# Patient Record
Sex: FEMALE | Race: BLACK OR AFRICAN AMERICAN | Employment: FULL TIME | URBAN - METROPOLITAN AREA
[De-identification: names, ages, dates, MRNs, and addresses within clinical notes are randomized per-mention and may not be internally consistent; named-entity substitution may affect disease eponyms.]

---

## 2017-10-03 ENCOUNTER — OFFICE VISIT (OUTPATIENT)
Dept: FAMILY MEDICINE CLINIC | Age: 47
End: 2017-10-03
Payer: MEDICARE

## 2017-10-03 VITALS
OXYGEN SATURATION: 96 % | HEART RATE: 77 BPM | WEIGHT: 293 LBS | HEIGHT: 64 IN | SYSTOLIC BLOOD PRESSURE: 138 MMHG | BODY MASS INDEX: 50.02 KG/M2 | DIASTOLIC BLOOD PRESSURE: 78 MMHG

## 2017-10-03 DIAGNOSIS — Z23 IMMUNIZATION DUE: ICD-10-CM

## 2017-10-03 DIAGNOSIS — N32.89 BLADDER SPASM: ICD-10-CM

## 2017-10-03 DIAGNOSIS — E78.2 MIXED HYPERLIPIDEMIA: ICD-10-CM

## 2017-10-03 DIAGNOSIS — M16.10 ARTHROPATHY OF HIP: ICD-10-CM

## 2017-10-03 DIAGNOSIS — E66.01 MORBID OBESITY DUE TO EXCESS CALORIES (HCC): ICD-10-CM

## 2017-10-03 DIAGNOSIS — I10 ESSENTIAL HYPERTENSION: Primary | ICD-10-CM

## 2017-10-03 DIAGNOSIS — Z51.81 MEDICATION MONITORING ENCOUNTER: ICD-10-CM

## 2017-10-03 PROCEDURE — 99204 OFFICE O/P NEW MOD 45 MIN: CPT | Performed by: FAMILY MEDICINE

## 2017-10-03 PROCEDURE — 90471 IMMUNIZATION ADMIN: CPT | Performed by: FAMILY MEDICINE

## 2017-10-03 PROCEDURE — 90688 IIV4 VACCINE SPLT 0.5 ML IM: CPT | Performed by: FAMILY MEDICINE

## 2017-10-03 RX ORDER — NAPROXEN 500 MG/1
500 TABLET ORAL 2 TIMES DAILY WITH MEALS
Qty: 180 TABLET | Refills: 3 | Status: SHIPPED | OUTPATIENT
Start: 2017-10-03 | End: 2018-10-15 | Stop reason: SDUPTHER

## 2017-10-03 RX ORDER — SOLIFENACIN SUCCINATE 5 MG/1
5 TABLET, FILM COATED ORAL DAILY
Qty: 30 TABLET | Refills: 3 | COMMUNITY
Start: 2017-10-03 | End: 2017-11-30 | Stop reason: SDUPTHER

## 2017-10-03 RX ORDER — PROMETHAZINE HYDROCHLORIDE 12.5 MG/1
12.5 TABLET ORAL EVERY 6 HOURS PRN
Qty: 60 TABLET | Refills: 5 | Status: SHIPPED | OUTPATIENT
Start: 2017-10-03 | End: 2018-10-15 | Stop reason: SDUPTHER

## 2017-10-03 RX ORDER — BUTALBITAL, ACETAMINOPHEN, CAFFEINE AND CODEINE PHOSPHATE 300; 50; 40; 30 MG/1; MG/1; MG/1; MG/1
1 CAPSULE ORAL EVERY 4 HOURS PRN
Qty: 60 CAPSULE | Refills: 2 | Status: SHIPPED | OUTPATIENT
Start: 2017-10-03 | End: 2018-01-06

## 2017-10-03 RX ORDER — PHENTERMINE HYDROCHLORIDE 37.5 MG/1
37.5 TABLET ORAL
Qty: 30 TABLET | Refills: 0 | Status: SHIPPED | OUTPATIENT
Start: 2017-10-03 | End: 2017-11-02

## 2017-10-03 RX ORDER — AMLODIPINE BESYLATE 5 MG/1
5 TABLET ORAL DAILY
Qty: 90 TABLET | Refills: 3 | Status: SHIPPED | OUTPATIENT
Start: 2017-10-03 | End: 2018-10-15 | Stop reason: SDUPTHER

## 2017-10-03 ASSESSMENT — PATIENT HEALTH QUESTIONNAIRE - PHQ9
1. LITTLE INTEREST OR PLEASURE IN DOING THINGS: 0
SUM OF ALL RESPONSES TO PHQ QUESTIONS 1-9: 0
2. FEELING DOWN, DEPRESSED OR HOPELESS: 0
SUM OF ALL RESPONSES TO PHQ9 QUESTIONS 1 & 2: 0

## 2017-10-03 ASSESSMENT — ENCOUNTER SYMPTOMS
EYE REDNESS: 0
NAUSEA: 0
COLOR CHANGE: 0
BLOOD IN STOOL: 0
ABDOMINAL DISTENTION: 0
RHINORRHEA: 0
VOMITING: 0
WHEEZING: 0
EYE DISCHARGE: 0
SHORTNESS OF BREATH: 0
VOICE CHANGE: 0
COUGH: 0
PHOTOPHOBIA: 0
BACK PAIN: 0
CONSTIPATION: 0
FACIAL SWELLING: 0
EYE PAIN: 0
SINUS PRESSURE: 0
ABDOMINAL PAIN: 0
SORE THROAT: 0
DIARRHEA: 0
CHEST TIGHTNESS: 0
EYE ITCHING: 0

## 2017-10-03 NOTE — PATIENT INSTRUCTIONS
nutrition, which is to say try to increase the density of nutrients in your food. Much of what triggers hunger is the constant sampling of blood chemistry that goes on in the brain, if youre deficient in a certain vitamin or mineral your brain will turn on your hunger in an effort to get those nutrients into the body, this is also to some degree what triggers cravings. To that end, taking a daily multivitamin is a good idea if youre trying to lose weight but its also a good idea to shop the edge of the store. What I mean here is that in most grocery stores the outside isles of the stores usually have fresh fruits and vegetables near the entrance followed by meats, then dairy, and finally frozen foods. When you get into the middle isles where everything is in a bag, box, or can; this is where there is a lot of calorie density but decreased nutritional value.   -eating healthy takes a lot more work than you may be used to and usually costs more as well so dont let these discourage you. You will probably have to begin setting aside time 1-2 times per week to cut and bag your own vegetables or other foods. Try to use zipper bags or plastic containers to portion out foods so that you dont eat more than you intended to. As an example, if you sit down watching TV with a bag of chips, you will continue eating the chips really not thinking about it because youre watching the TV, if instead you pour the portion you want in a bowl and take it with you youll eat that many and no more (unless you get up to get more). You can take that same large bag of chips and portion it into smaller zipper bags for use even outside your home.   -try to plan out your meals in advance, some people plan their meals a week at a time and set up everything in portioned out meals for the whole week. Planning prevents you from grabbing whats easy instead of whats nutritious.  It also helps you keep track of how many calories you are using an rene on your phone, tablet, or computer to track calories. I recommend the [employer] rene which is free for apple and android platforms. It will after using it for a week or so start giving you feedback on how to modify your diet. You can also link it to other people which really helps to motivate you to stay on track (its like the aditya system for weight loss). Once youve given some of these a chance (and again dont try to lose more than about a pound per week) we can start to talk about next steps. I generally ask that you give 3-4 months to work on these before moving on to the next phase of treatment if needed. One final note, while youre trying to lose weight weigh yourself only once per week, there is up to 5 pounds of variation from one day to the next depending on how well hydrated you are and whether youve pooped that day and weighing yourself daily can be counterproductive to your goals. How your clothing fits is a much better indication of weight loss than the scale, especially when youre adding in exercise which causes muscle gain in place of fat loss. When trying to lose weight it really boils down to burning more calories than you take in. This is always easier to achieve by taking less in and the example that I use is metcalf. One slice of thin cut metcalf has enough calories that the average person will have to walk or run about 2 miles to burn them off, so its obviously easier to limit yourself to two strips than it is to eat 4 and then go out and walk 4 miles. At this point, you should have been working on dietary modification for a few weeks and hopefully youve had some initial success but even with the analogy above, at some point you will have to increase physical activity because the more you restrict your calorie intake the more your metabolic rate will be suppressed so eventually your weight loss will plateau.   When you stop losing with diet modification alone, dont try to further restrict your calories, instead try to ramp up your metabolic rate. There are several ways to do this, the best being adding physical activity to your regimen. I encourage all patients, whether trying to lose weight or not to get a minimum of 150 minutes of physical activity per week and by physical activity I mean any activity that can get your heart rate over 100. It doesnt matter how this totals, it can be 50 minutes 3 times per week, 30 minutes 5 times per week, or 150 minutes one day per week, its the total that matters. Many people who are trying to lose weight are doing so because they have medical or physical limitations that prevent them from being as active as they would like, which I understand. Try to choose activities that you enjoy, for example going for a walk at a park at a brisk pace or simply parking further away at the store than you usually would. Low impact exercises are preferable such as brisk walking, swimming, or biking because they dont abuse your joints as much but its also important to get some resistance training a few times per week as well. Weight lifting or using resistance bands (giant rubber bands) can achieve this and should be chosen so that over the course of the week they work all the major muscle groups in your body. Resistance training builds muscle and muscle burns calories even when you arent using them. An example here would be to do resistance training on your arms and upper body on Monday, brisk walking on Tuesday, resistance training on the legs on Wednesday, brisk walk again on Thursday, resistance training on your core on Friday, brisk walk again on Saturday, and then start the cycle over. Swimming is an example of exercise that builds muscle and works your heart at the same time. You may want to consider subscribing to a magazine such as [id number] or [address] which both have great diet and exercise tips in them.    Besides exercise there are several medications that help to boost your metabolic rate: Adipex which is an amphetamine can boost your metabolic rate and to some degree also suppresses hunger but has some potentially negative side effects such as elevations in blood pressure, and pulse rate as well as some interference with sleep. Amphetamines also carry risk of dependence and addiction and for this reason there are some laws that affect Adipex that do not apply to other medications for instance we are required by law to see you at least once per month while on this medication, you have to fill the medication every 30 days (going more than 36 days between fills counts as a new start), and you can only be on it for a maximum of 90 days before youre legally required to be off of it for 6 months. If there is a situation where it may be considered a new start, there must not have been any use within the previous 6 months. One advantage of Adipex however is that its generally the least expensive weight loss medication and many insurances do not pay for weight loss medication. Qsymia is another medication that contains the same amphetamine as Adipex but at lower doses that reduce all the risks associated with Adipex including risk of dependence and addiction. Qsymia also contains a second medication that targets appetite and decreases the urge to eat when you arent truly hungry. The biggest drawback with Qsymia is the price and it generally averages between $/month. When you put this in perspective though $3/day is less than a [employer] cappuccino each day and if it positively affects your health, it may be well worth the cost.  This medication also requires monthly office weight checks but does not require a full office visit like Adipex does.    Contrave is a medication that contains Bupropion, a medication that boosts metabolic rate and also helps increase motivation and also Naltrexone which works on the pleasure center of the brain to block the pleasurable effect of eating so that you eat until youre full but dont continue to eat beyond that because it tastes good. This medication averages about $50/month and comes with a program from the company called the scale down program which is a support program that keeps you motivated toward your goal of weight loss. Contave is not quite as effective as Adipex or Qsymia but since the price is lower I often encourage this medication as a fist line treatment. This medication has a suggestion for monthly office weight monitoring but no legal requirement to do so. [employer] is a once daily injectable medication that doesnt boost your metabolic rate but does alter the way you store calories so that if there are excess calories, they are burned rather than stored. This medication also helps to make you feel full faster to prevent over eating. Among the various medication options this is actually one of the most effective but if not covered on your insurance could be as much as $1200/month. There are however very few negatives with this medication other than some mild nausea when starting. This medication has a suggestion for monthly office weight monitoring but no legal requirement to do so. Belviq is a medication that is a close cousin of antidepressant medications and does not affect metabolic rate but does help to suppress appetite and allows you to feel full faster. Belviqs main limitation is that many people struggling with weight are also being treated for mood disorders such as anxiety and depression and Belviq cannot be used in conjunction with medications that treat these disorders. The cost for this medication averages about $65/month and because it is not as effective as the rest is usually not suggested as a first line treatment. Xenical and Ori are both the same medication but at different doses.   Ori is available over the counter and Xenical is by

## 2017-10-03 NOTE — PROGRESS NOTES
Subjective:      Patient ID: Jessica Damon is a 40 y.o. female. HPI  Here to establish as a new patient and has been generally well and healthy and has been having some right hip pain which has been present for the past several months and has been associated with some catching and popping sensation. She has also had a long standing history of migraine since she was in her 25s and has been continuing to have some trouble with frequent headaches over the past several years with some slight increase in the intensity over the past few months. She uses a CPAP machine for sleep and has been compliant with this. She has not been checking BP at home or in the ambulatory setting. She also has been suffering from generalized anxiety for years which has been a little worse lately and is especially bad when she is in crowds or around people that she doesn't know. Her diet is generally poor and she has been through bariatric surgery in the past and has also been on adipex in the past with good success. She has been staying well hydrated and has had moderate physical activity. She sleeps well with the CPAP. Review of Systems   Constitutional: Negative for activity change, appetite change, chills, diaphoresis, fatigue, fever and unexpected weight change. HENT: Negative for congestion, ear pain, facial swelling, hearing loss, postnasal drip, rhinorrhea, sinus pressure, sore throat and voice change. Eyes: Negative for photophobia, pain, discharge, redness, itching and visual disturbance. Respiratory: Negative for cough, chest tightness, shortness of breath and wheezing. Cardiovascular: Negative for chest pain and palpitations. Gastrointestinal: Negative for abdominal distention, abdominal pain, blood in stool, constipation, diarrhea, nausea and vomiting. Endocrine: Negative for cold intolerance and heat intolerance.    Genitourinary: Negative for decreased urine volume, difficulty urinating, dysuria, flank pain, frequency, hematuria, pelvic pain, urgency, vaginal bleeding and vaginal discharge. Musculoskeletal: Negative for arthralgias, back pain, gait problem, joint swelling, myalgias, neck pain and neck stiffness. Skin: Negative for color change, pallor and rash. Allergic/Immunologic: Negative for environmental allergies and food allergies. Neurological: Negative for dizziness, tremors, seizures, syncope, facial asymmetry, speech difficulty, weakness, numbness and headaches. Psychiatric/Behavioral: Negative for agitation, behavioral problems, dysphoric mood and sleep disturbance. The patient is not nervous/anxious and is not hyperactive. Objective:   Physical Exam   Constitutional: She is oriented to person, place, and time. She appears well-developed and well-nourished. She does not appear ill. No distress. HENT:   Head: Normocephalic and atraumatic. Right Ear: External ear normal.   Left Ear: External ear normal.   Nose: Nose normal. No mucosal edema or rhinorrhea. Mouth/Throat: Mucous membranes are normal. No oropharyngeal exudate, posterior oropharyngeal edema or posterior oropharyngeal erythema. Eyes: EOM are normal. Pupils are equal, round, and reactive to light. Right eye exhibits no discharge. Left eye exhibits no discharge. No scleral icterus. Neck: Trachea normal and normal range of motion. Neck supple. No JVD present. Carotid bruit is not present. No tracheal deviation present. No thyromegaly present. Cardiovascular: Normal rate, regular rhythm, S1 normal, S2 normal, normal heart sounds and normal pulses. Exam reveals no gallop, no S3, no S4, no distant heart sounds and no friction rub. No murmur heard. Pulmonary/Chest: No respiratory distress. She has no decreased breath sounds. She has no wheezes. She has no rhonchi. She has no rales. Abdominal: Soft. Normal appearance and bowel sounds are normal. There is no hepatosplenomegaly. There is no tenderness. There is no CVA tenderness. No hernia. Musculoskeletal:        Right hip: She exhibits decreased range of motion and crepitus. She exhibits normal strength, no tenderness, no bony tenderness, no swelling, no deformity and no laceration. Lymphadenopathy:     She has no cervical adenopathy. Right cervical: No superficial cervical adenopathy present. Left cervical: No superficial cervical adenopathy present. Neurological: She is alert and oriented to person, place, and time. She has normal strength. No cranial nerve deficit or sensory deficit. Coordination and gait normal.   Reflex Scores:       Brachioradialis reflexes are 2+ on the right side and 2+ on the left side. Patellar reflexes are 2+ on the right side and 2+ on the left side. Achilles reflexes are 2+ on the right side and 2+ on the left side. Skin: Skin is warm and dry. No lesion and no rash noted. She is not diaphoretic. No cyanosis. Nails show no clubbing. Psychiatric: She has a normal mood and affect. Her speech is normal and behavior is normal. Judgment and thought content normal. Cognition and memory are normal.       Assessment:          Plan:      1. Essential hypertension  - Discussed the role of hypertension in development of other disease courses such as CHF and atherosclerosis. - Encouraged patient to avoid sodium in the diet and reminded that the majority of sodium comes from packaged foods in cans and boxes which should be avoided where possible. - Encouraged good hydration and avoidance of caffeine where possible. - Discussed goals for blood pressure monitoring which should be 140/90.     - amLODIPine (NORVASC) 5 MG tablet; Take 1 tablet by mouth daily  Dispense: 90 tablet; Refill: 3    2. Mixed hyperlipidemia  - Discussed the role of statins in the control of lipids and encouraged a low fat, low carbohydrate diet rich in vegetables and plant matter.    - Discussed the side effects of lipid lowering

## 2017-10-03 NOTE — MR AVS SNAPSHOT
After Visit Summary             Rasheeda Han   10/3/2017 9:30 AM   Office Visit    Description:  Female : 1970   Provider:  Blue Pratt MD   Department:  [address] [place] [address] Physicians              Your Follow-Up and Future Appointments         Below is a list of your follow-up and future appointments. This may not be a complete list as you may have made appointments directly with providers that we are not aware of or your providers may have made some for you. Please call your providers to confirm appointments. It is important to keep your appointments. Please bring your current insurance card, photo ID, co-pay, and all medication bottles to your appointment. If self-pay, payment is expected at the time of service. Your To-Do List     Future Appointments Provider Department Dept Phone    11/3/2017 11:00 AM SCHEDULE, [address] Physicians [phone]    If this is a fasting lab, please do not eat or drink past midnight other than water. 2017 11:00 AM SCHEDULE, [facility] [phone]    If this is a fasting lab, please do not eat or drink past midnight other than water. 2018 11:00 AM Blue Pratt MD [address] [facility] Phys[facility] [phone]    Please arrive 15 minutes prior to appointment, bring photo ID and insurance card. Future Orders Complete By Expires    C-Reactive Protein [YXW822 Custom]  10/3/2017 10/3/2018    CBC With Auto Differential [[id number] Custom]  10/3/2017 10/3/2018    Comprehensive Metabolic Panel [QJD53 Custom]  10/3/2017 10/3/2018    Lipid Panel [LAB18 Custom]  10/3/2017 10/3/2018    TSH [[id number] Custom]  10/3/2017 10/3/2018    XR HIP RIGHT (2-3 VIEWS) [[id number] Custom]  10/3/2017 10/3/2018    Follow-Up    Return in about 3 months (around 1/3/2018).          Information from Your Visit        Department     Name Address Phone Fax    [address] [facility] Physicians [address] [place] [address] [address] Rika Hartley  [address] [facility] [phone]      You Were Seen for:         Comments    Essential hypertension   [951014]         Vital Signs     Blood Pressure Pulse Height Weight Oxygen Saturation Body Mass Index    138/78 77 5' 4\" (1.626 m) 406 lb (184.2 kg) 96% 69.69 kg/m2    Smoking Status                   Never Smoker           Additional Information about your Body Mass Index (BMI)           Your BMI as listed above is considered obese (30 or more). BMI is an estimate of body fat, calculated from your height and weight. The higher your BMI, the greater your risk of heart disease, high blood pressure, type 2 diabetes, stroke, gallstones, arthritis, sleep apnea, and certain cancers. BMI is not perfect. It may overestimate body fat in athletes and people who are more muscular. Even a small weight loss (between 5 and 10 percent of your current weight) by decreasing your calorie intake and becoming more physically active will help lower your risk of developing or worsening diseases associated with obesity. Learn more at: [employer]          Instructions    -obesity is a condition that is not easily overcome and some of the mistakes that prevent success with weight loss will be briefly reviewed here. Keep in mind throughout this discussion that weight loss really boils down to one simple principle, you need to burn more calories than you take in.   -keep in mind that when you gain weight, you generally do it slowly so expectations for weight loss should be the same in reverse (meaning if you gained a pound per week, dont expect to lose more than a pound per week. Losing weight too quickly causes your metabolic rate to go into a starvation mode where your body will intentionally store some calories for later regardless of how much youre eating.   Because of that change in metabolism that calorie restriction causes, people often will ultimately this is where there is a lot of calorie density but decreased nutritional value.   -eating healthy takes a lot more work than you may be used to and usually costs more as well so dont let these discourage you. You will probably have to begin setting aside time 1-2 times per week to cut and bag your own vegetables or other foods. Try to use zipper bags or plastic containers to portion out foods so that you dont eat more than you intended to. As an example, if you sit down watching TV with a bag of chips, you will continue eating the chips really not thinking about it because youre watching the TV, if instead you pour the portion you want in a bowl and take it with you youll eat that many and no more (unless you get up to get more). You can take that same large bag of chips and portion it into smaller zipper bags for use even outside your home.   -try to plan out your meals in advance, some people plan their meals a week at a time and set up everything in portioned out meals for the whole week. Planning prevents you from grabbing whats easy instead of whats nutritious. It also helps you keep track of how many calories you are taking in per day and helps avoid going over your goal.   -its critical that you stay well hydrated as well, when you become dehydrated sometimes its hard for your brain to differentiate whether you need food or fluids and the hunger center gets switched on. Try to make sure that you are getting at least 80 ounces of water intake per day. Avoid drinking your calories, the average American consumes about 700 calories per day from beverages which accounts for 1/3 of the total calorie intake for the day. Also keep in mind that when something sweet hits your tongue it causes release of insulin in preparation to receive sugar in the blood.   If youre drinking beverages with sugar substitutes this still happens and when no sugar shows up in the blood stream, your blood sugar drops and your hunger is turned on in order to bring the blood sugar back up. For this reason WATER is the best beverage to be drinking. If you absolutely need flavor, drink low calorie fruit or vegetable juice so that youre at least getting some nutritional value.   -sleep is critically important as well. If youre sleep deprived you will release more cortisol (a stress hormone) that simulates hunger, lowers your metabolic rate, and causes fatigue. Try to go to bed at the same time every day and wake at the same time every day (even on weekends). Avoid TV or other light producing screens for about an hour before bed if possible in order to promote deeper more restful sleep.   -there is mounting research that many people who have obesity suffer from overgrowth of abnormal gut bacteria. As a result, I have suggested that patients trying to lose weight start on a probiotic, specifically florajen 3 or a generic equivalent (locally [place] has a good generic equivalent). Its important to get a probiotic that has several different types of bacteria which populate all of the three pH zones of the digestive tract.    -Finally, I highly suggest using an rene on your phone, tablet, or computer to track calories. I recommend the "[employer]pal rene which is free for apple and android platforms. It will after using it for a week or so start giving you feedback on how to modify your diet. You can also link it to other people which really helps to motivate you to stay on track (its like the aditya system for weight loss). Once youve given some of these a chance (and again dont try to lose more than about a pound per week) we can start to talk about next steps. I generally ask that you give 3-4 months to work on these before moving on to the next phase of treatment if needed.   One final note, while youre trying to lose weight weigh yourself only once per week, there is up to 5 they would like, which I understand. Try to choose activities that you enjoy, for example going for a walk at a park at a brisk pace or simply parking further away at the store than you usually would. Low impact exercises are preferable such as brisk walking, swimming, or biking because they dont abuse your joints as much but its also important to get some resistance training a few times per week as well. Weight lifting or using resistance bands (giant rubber bands) can achieve this and should be chosen so that over the course of the week they work all the major muscle groups in your body. Resistance training builds muscle and muscle burns calories even when you arent using them. An example here would be to do resistance training on your arms and upper body on Monday, brisk walking on Tuesday, resistance training on the legs on Wednesday, brisk walk again on Thursday, resistance training on your core on Friday, brisk walk again on Saturday, and then start the cycle over. Swimming is an example of exercise that builds muscle and works your heart at the same time. You may want to consider subscribing to a magazine such as [address] or [employer] which both have great diet and exercise tips in them. Besides exercise there are several medications that help to boost your metabolic rate: Adipex which is an amphetamine can boost your metabolic rate and to some degree also suppresses hunger but has some potentially negative side effects such as elevations in blood pressure, and pulse rate as well as some interference with sleep.   Amphetamines also carry risk of dependence and addiction and for this reason there are some laws that affect Adipex that do not apply to other medications for instance we are required by law to see you at least once per month while on this medication, you have to fill the medication every 30 days (going more than 36 days between fills counts as a new start), and you can only be on it for a maximum of 90 days before youre legally required to be off of it for 6 months. If there is a situation where it may be considered a new start, there must not have been any use within the previous 6 months. One advantage of Adipex however is that its generally the least expensive weight loss medication and many insurances do not pay for weight loss medication. Qsymia is another medication that contains the same amphetamine as Adipex but at lower doses that reduce all the risks associated with Adipex including risk of dependence and addiction. Qsymia also contains a second medication that targets appetite and decreases the urge to eat when you arent truly hungry. The biggest drawback with Qsymia is the price and it generally averages between $/month. When you put this in perspective though $3/day is less than a [employer] cappuccino each day and if it positively affects your health, it may be well worth the cost.  This medication also requires monthly office weight checks but does not require a full office visit like Adipex does. Contrave is a medication that contains Bupropion, a medication that boosts metabolic rate and also helps increase motivation and also Naltrexone which works on the pleasure center of the brain to block the pleasurable effect of eating so that you eat until youre full but dont continue to eat beyond that because it tastes good. This medication averages about $50/month and comes with a program from the company called the scale down program which is a support program that keeps you motivated toward your goal of weight loss. Contave is not quite as effective as Adipex or Qsymia but since the price is lower I often encourage this medication as a fist line treatment. This medication has a suggestion for monthly office weight monitoring but no legal requirement to do so. Dwana Castor is a once daily injectable medication that doesnt boost your metabolic rate but does alter the way you store calories so that if there are excess calories, they are burned rather than stored. This medication also helps to make you feel full faster to prevent over eating. Among the various medication options this is actually one of the most effective but if not covered on your insurance could be as much as $1200/month. There are however very few negatives with this medication other than some mild nausea when starting. This medication has a suggestion for monthly office weight monitoring but no legal requirement to do so. Belviq is a medication that is a close cousin of antidepressant medications and does not affect metabolic rate but does help to suppress appetite and allows you to feel full faster. Belviqs main limitation is that many people struggling with weight are also being treated for mood disorders such as anxiety and depression and Belviq cannot be used in conjunction with medications that treat these disorders. The cost for this medication averages about $65/month and because it is not as effective as the rest is usually not suggested as a first line treatment. Xenical and Ori are both the same medication but at different doses. Ori is available over the counter and Xenical is by prescription only. These medications block the absorption of fats in the diet which reduces absorption of calories. The main problem with these is that if you consume fats but dont absorb them they have to go somewhere and the main side effect is greasy flatulence and loose stools. This medication thereby creates an aversion to fatty foods which usually stays with you even after the medication is withdrawn. Medically the main complication is that with prolonged use (more than a few months) you can develop fat soluble vitamin deficiencies so it isnt recommended for long term use. Xenical usually averages about $ per month whereas Ori usually is in the $40-60/month range. Today's Medication Changes          These changes are accurate as of: 10/3/17 10:22 AM.  If you have any questions, ask your nurse or doctor. START taking these medications           butalbital-acetaminophen-caffeine-codeine -20-30 MG per capsule   Commonly known as:  FIORICET WITH CODEINE   Instructions: Take 1 capsule by mouth every 4 hours as needed (migraine)   Quantity:  60 capsule   Refills:  2   Started by:  Tara Reeder MD       phentermine 37.5 MG tablet   Commonly known as:  ADIPEX-P   Instructions: Take 1 tablet by mouth every morning (before breakfast)   Quantity:  30 tablet   Refills:  0   Started by:  Tara Reeder MD       solifenacin 5 MG tablet   Commonly known as:  VESICARE   Instructions: Take 1 tablet by mouth daily   Quantity:  30 tablet   Refills:  3   Started by:  Tara Reeder MD         STOP taking these medications           butalbital-acetaminophen-caffeine -40 MG per tablet   Commonly known as:  FIORICET   Stopped by:  Tara Reeder MD       metoprolol succinate 25 MG extended release tablet   Commonly known as:  Carliss Gambles by:  Tara Reeder MD            Where to Get Your Medications      These medications were sent to [facility] [address] [facility], [address]  [address] [facility], [address] [place] [id number]     Phone:  [phone]     amLODIPine 5 MG tablet    butalbital-acetaminophen-caffeine-codeine -60-30 MG per capsule    naproxen 500 MG tablet    phentermine 37.5 MG tablet    promethazine 12.5 MG tablet         Information about where to get these medications is not yet available     !  Ask your nurse or doctor about these medications     solifenacin 5 MG tablet               Your Current Medications Are

## 2017-10-13 ENCOUNTER — HOSPITAL ENCOUNTER (OUTPATIENT)
Age: 47
Discharge: HOME | End: 2017-10-13
Payer: MEDICARE

## 2017-10-13 ENCOUNTER — HOSPITAL ENCOUNTER (OUTPATIENT)
Dept: GENERAL RADIOLOGY | Age: 47
Discharge: HOME | End: 2017-10-13
Payer: MEDICARE

## 2017-10-13 DIAGNOSIS — R52 PAIN: ICD-10-CM

## 2017-10-13 DIAGNOSIS — E78.2 MIXED HYPERLIPIDEMIA: ICD-10-CM

## 2017-10-13 DIAGNOSIS — M16.10 ARTHROPATHY OF HIP: ICD-10-CM

## 2017-10-13 DIAGNOSIS — Z51.81 MEDICATION MONITORING ENCOUNTER: ICD-10-CM

## 2017-10-13 LAB
ABSOLUTE EOS #: 0.2 K/UL (ref 0–0.4)
ABSOLUTE LYMPH #: 1.5 K/UL (ref 1–4.8)
ABSOLUTE MONO #: 0.2 K/UL (ref 0.2–0.8)
ALBUMIN SERPL-MCNC: 3.8 G/DL (ref 3.5–5.2)
ALBUMIN/GLOBULIN RATIO: ABNORMAL (ref 1–2.5)
ALP BLD-CCNC: 61 U/L (ref 35–104)
ALT SERPL-CCNC: 8 U/L (ref 5–33)
ANION GAP SERPL CALCULATED.3IONS-SCNC: 10 MMOL/L (ref 9–17)
AST SERPL-CCNC: 13 U/L
BASOPHILS # BLD: 1 %
BASOPHILS ABSOLUTE: 0 K/UL (ref 0–0.2)
BILIRUB SERPL-MCNC: 0.26 MG/DL (ref 0.3–1.2)
BUN BLDV-MCNC: 16 MG/DL (ref 6–20)
BUN/CREAT BLD: 23 (ref 9–20)
C-REACTIVE PROTEIN: 5.1 MG/L (ref 0–5)
CALCIUM SERPL-MCNC: 8.7 MG/DL (ref 8.6–10.4)
CHLORIDE BLD-SCNC: 106 MMOL/L (ref 98–107)
CHOLESTEROL/HDL RATIO: 3.2
CHOLESTEROL: 143 MG/DL
CO2: 24 MMOL/L (ref 20–31)
CREAT SERPL-MCNC: 0.69 MG/DL (ref 0.5–0.9)
DIFFERENTIAL TYPE: ABNORMAL
EOSINOPHILS RELATIVE PERCENT: 5 %
GFR AFRICAN AMERICAN: >60 ML/MIN
GFR NON-AFRICAN AMERICAN: >60 ML/MIN
GFR SERPL CREATININE-BSD FRML MDRD: ABNORMAL ML/MIN/{1.73_M2}
GFR SERPL CREATININE-BSD FRML MDRD: ABNORMAL ML/MIN/{1.73_M2}
GLUCOSE BLD-MCNC: 85 MG/DL (ref 70–99)
HCT VFR BLD CALC: 38 % (ref 36–46)
HDLC SERPL-MCNC: 45 MG/DL
HEMOGLOBIN: 12.5 G/DL (ref 12–16)
LDL CHOLESTEROL: 90 MG/DL (ref 0–130)
LYMPHOCYTES # BLD: 49 %
MCH RBC QN AUTO: 28.2 PG (ref 26–34)
MCHC RBC AUTO-ENTMCNC: 32.9 G/DL (ref 31–37)
MCV RBC AUTO: 85.7 FL (ref 80–100)
MONOCYTES # BLD: 8 %
PDW BLD-RTO: 14.8 % (ref 11.5–14.5)
PLATELET # BLD: 254 K/UL (ref 130–400)
PLATELET ESTIMATE: ABNORMAL
PMV BLD AUTO: 8.2 FL (ref 6–12)
POTASSIUM SERPL-SCNC: 4.1 MMOL/L (ref 3.7–5.3)
RBC # BLD: 4.43 M/UL (ref 4–5.2)
RBC # BLD: ABNORMAL 10*6/UL
SEG NEUTROPHILS: 37 %
SEGMENTED NEUTROPHILS ABSOLUTE COUNT: 1.1 K/UL (ref 1.8–7.7)
SODIUM BLD-SCNC: 140 MMOL/L (ref 135–144)
TOTAL PROTEIN: 7.4 G/DL (ref 6.4–8.3)
TRIGL SERPL-MCNC: 39 MG/DL
TSH SERPL DL<=0.05 MIU/L-ACNC: 1.65 MIU/L (ref 0.3–5)
VLDLC SERPL CALC-MCNC: NORMAL MG/DL (ref 1–30)
WBC # BLD: 3.1 K/UL (ref 3.5–11)
WBC # BLD: ABNORMAL 10*3/UL

## 2017-10-13 PROCEDURE — 80053 COMPREHEN METABOLIC PANEL: CPT

## 2017-10-13 PROCEDURE — 84443 ASSAY THYROID STIM HORMONE: CPT

## 2017-10-13 PROCEDURE — 36415 COLL VENOUS BLD VENIPUNCTURE: CPT

## 2017-10-13 PROCEDURE — 80061 LIPID PANEL: CPT

## 2017-10-13 PROCEDURE — 86140 C-REACTIVE PROTEIN: CPT

## 2017-10-13 PROCEDURE — 85025 COMPLETE CBC W/AUTO DIFF WBC: CPT

## 2017-10-13 PROCEDURE — 73502 X-RAY EXAM HIP UNI 2-3 VIEWS: CPT

## 2017-10-16 ENCOUNTER — TELEPHONE (OUTPATIENT)
Dept: FAMILY MEDICINE CLINIC | Age: 47
End: 2017-10-16

## 2017-10-16 NOTE — TELEPHONE ENCOUNTER
Lv, to let her know that her Fioricet/ Codeine medication get denied from her insurance company and she can cash which is $40.00 or she can call her insurance company and see what they cover

## 2017-10-19 DIAGNOSIS — M16.11 ARTHRITIS OF RIGHT HIP: Primary | ICD-10-CM

## 2017-11-30 DIAGNOSIS — N32.89 BLADDER SPASM: ICD-10-CM

## 2017-11-30 RX ORDER — SOLIFENACIN SUCCINATE 5 MG/1
5 TABLET, FILM COATED ORAL DAILY
Qty: 30 TABLET | Refills: 11 | Status: SHIPPED | OUTPATIENT
Start: 2017-11-30 | End: 2018-01-26

## 2018-01-03 ENCOUNTER — TELEPHONE (OUTPATIENT)
Dept: FAMILY MEDICINE CLINIC | Age: 48
End: 2018-01-03

## 2018-01-03 RX ORDER — DIPHENHYDRAMINE HCL 25 MG
25 CAPSULE ORAL EVERY 6 HOURS PRN
Qty: 120 CAPSULE | Refills: 0 | Status: SHIPPED | OUTPATIENT
Start: 2018-01-03 | End: 2018-01-06

## 2018-01-03 NOTE — TELEPHONE ENCOUNTER
The patient is calling in to see if she can get a rx for Benadryl sent to [facility]. She says she had an allergic reaction to something and she is not sure of hat it was. She was in the [facility] urgent care over the weekend and they advised her that she did have a reaction and they gave her steroid and benadryl but she is almost out of the benadryl. She says she had a rash all over her body. What do you recommend?

## 2018-01-06 ENCOUNTER — HOSPITAL ENCOUNTER (EMERGENCY)
Age: 48
Discharge: HOME | End: 2018-01-06
Attending: EMERGENCY MEDICINE
Payer: MEDICARE

## 2018-01-06 VITALS
RESPIRATION RATE: 18 BRPM | SYSTOLIC BLOOD PRESSURE: 154 MMHG | HEART RATE: 95 BPM | HEIGHT: 64 IN | DIASTOLIC BLOOD PRESSURE: 86 MMHG | OXYGEN SATURATION: 100 % | BODY MASS INDEX: 50.02 KG/M2 | TEMPERATURE: 98.3 F | WEIGHT: 293 LBS

## 2018-01-06 DIAGNOSIS — B86 SCABIES: Primary | ICD-10-CM

## 2018-01-06 PROCEDURE — 99282 EMERGENCY DEPT VISIT SF MDM: CPT

## 2018-01-06 RX ORDER — PERMETHRIN 50 MG/G
CREAM TOPICAL
Qty: 60 G | Refills: 1 | Status: SHIPPED | OUTPATIENT
Start: 2018-01-06 | End: 2020-08-13

## 2018-01-06 NOTE — ED PROVIDER NOTES
Relation Age of Onset    Thyroid Disease Mother     Asthma Mother     Heart Failure Mother     COPD Mother     Heart Attack Father     Diabetes Father     Stroke Father      Family Status   Relation Status    Mother Alive    Father         SOCIAL HISTORY      reports that she has never smoked. She has never used smokeless tobacco. She reports that she drinks alcohol. She reports that she does not use drugs. REVIEW OF SYSTEMS    (2-9 systems for level 4, 10 or more for level 5)     Review of Systems  GEN: no fevers  Pulm: No SOB, No wheezing, No cough  GI: No abdominal pain, No Nausea, No Vomiting, No diarrhea  MSK: No msk pain, No msk injuries  Skin: Positive for diffuse rash    Except as noted above the remainder of the review of systems was reviewed and negative. PHYSICAL EXAM    (up to 7 for level 4, 8 or more for level 5)     ED Triage Vitals [18 1702]   BP Temp Temp src Pulse Resp SpO2 Height Weight   (!) 154/86 98.3 °F (36.8 °C) -- 95 18 100 % 5' 4\" (1.626 m) (!) 419 lb (190.1 kg)     Physical Exam   Constitutional: She is oriented to person, place, and time. She appears well-developed and well-nourished. No distress. HENT:   Head: Normocephalic and atraumatic. Eyes: EOM are normal.   Neck: Normal range of motion. Neck supple. Pulmonary/Chest: Effort normal. No respiratory distress. Musculoskeletal: Normal range of motion. She exhibits no deformity. Neurological: She is alert and oriented to person, place, and time. Skin: Skin is warm and dry. Rash (diffuse raised papules with occasional excoriation from scratching. No involvement of the palms. Involvement of the intra-web spaces between the fingers. No involvement of the face.) noted. Psychiatric: She has a normal mood and affect. Her behavior is normal. Judgment and thought content normal.   Nursing note and vitals reviewed.     DIAGNOSTIC RESULTS     RADIOLOGY:   Non-plain film images such as CT, Ultrasound and

## 2018-01-08 ENCOUNTER — TELEPHONE (OUTPATIENT)
Dept: FAMILY MEDICINE CLINIC | Age: 48
End: 2018-01-08

## 2018-01-08 RX ORDER — IVERMECTIN 3 MG/1
12 TABLET ORAL ONCE
Qty: 4 TABLET | Refills: 0 | Status: SHIPPED | OUTPATIENT
Start: 2018-01-08 | End: 2018-01-08

## 2018-01-08 NOTE — TELEPHONE ENCOUNTER
Patient is asking for work note for scabies.  Please advise patient would like to go back asa fax to [phone] please advise :)

## 2018-01-08 NOTE — TELEPHONE ENCOUNTER
The patient was calling in to see if you can call in the oral medication for Scabies. She says she was in the ER on Saturday and they dx'd her with scabies and they gave her a cream and advised her to call as soon as possible to get the oral medication. Please send to [facility]. What do you recommend?

## 2018-01-26 ENCOUNTER — OFFICE VISIT (OUTPATIENT)
Dept: FAMILY MEDICINE CLINIC | Age: 48
End: 2018-01-26
Payer: MEDICARE

## 2018-01-26 ENCOUNTER — TELEPHONE (OUTPATIENT)
Dept: FAMILY MEDICINE CLINIC | Age: 48
End: 2018-01-26

## 2018-01-26 VITALS
HEART RATE: 68 BPM | DIASTOLIC BLOOD PRESSURE: 78 MMHG | SYSTOLIC BLOOD PRESSURE: 118 MMHG | RESPIRATION RATE: 14 BRPM | BODY MASS INDEX: 72.78 KG/M2 | WEIGHT: 293 LBS

## 2018-01-26 DIAGNOSIS — F98.8 ATTENTION DEFICIT DISORDER (ADD) WITHOUT HYPERACTIVITY: ICD-10-CM

## 2018-01-26 DIAGNOSIS — E66.01 MORBID OBESITY (HCC): ICD-10-CM

## 2018-01-26 DIAGNOSIS — M25.551 RIGHT HIP PAIN: Primary | ICD-10-CM

## 2018-01-26 PROCEDURE — 1036F TOBACCO NON-USER: CPT | Performed by: FAMILY MEDICINE

## 2018-01-26 PROCEDURE — G8428 CUR MEDS NOT DOCUMENT: HCPCS | Performed by: FAMILY MEDICINE

## 2018-01-26 PROCEDURE — 99213 OFFICE O/P EST LOW 20 MIN: CPT | Performed by: FAMILY MEDICINE

## 2018-01-26 PROCEDURE — G8417 CALC BMI ABV UP PARAM F/U: HCPCS | Performed by: FAMILY MEDICINE

## 2018-01-26 PROCEDURE — G8484 FLU IMMUNIZE NO ADMIN: HCPCS | Performed by: FAMILY MEDICINE

## 2018-01-26 RX ORDER — DEXTROAMPHETAMINE SACCHARATE, AMPHETAMINE ASPARTATE, DEXTROAMPHETAMINE SULFATE AND AMPHETAMINE SULFATE 5; 5; 5; 5 MG/1; MG/1; MG/1; MG/1
20 TABLET ORAL 2 TIMES DAILY
Qty: 60 TABLET | Refills: 0 | Status: SHIPPED | OUTPATIENT
Start: 2018-01-26 | End: 2018-02-28 | Stop reason: SDUPTHER

## 2018-01-26 RX ORDER — DEXTROAMPHETAMINE SACCHARATE, AMPHETAMINE ASPARTATE, DEXTROAMPHETAMINE SULFATE AND AMPHETAMINE SULFATE 5; 5; 5; 5 MG/1; MG/1; MG/1; MG/1
20 TABLET ORAL DAILY
Qty: 60 TABLET | Refills: 0 | Status: CANCELLED | OUTPATIENT
Start: 2018-01-26 | End: 2018-02-25

## 2018-01-26 RX ORDER — HYDROCODONE BITARTRATE AND ACETAMINOPHEN 5; 325 MG/1; MG/1
1-2 TABLET ORAL EVERY 6 HOURS PRN
Qty: 50 TABLET | Refills: 0 | Status: SHIPPED | OUTPATIENT
Start: 2018-01-26 | End: 2018-02-28 | Stop reason: SDUPTHER

## 2018-01-26 RX ORDER — DEXTROAMPHETAMINE SACCHARATE, AMPHETAMINE ASPARTATE, DEXTROAMPHETAMINE SULFATE AND AMPHETAMINE SULFATE 5; 5; 5; 5 MG/1; MG/1; MG/1; MG/1
20 TABLET ORAL DAILY
Qty: 60 TABLET | Refills: 0 | Status: SHIPPED | OUTPATIENT
Start: 2018-01-26 | End: 2018-01-26 | Stop reason: SDUPTHER

## 2018-01-26 RX ORDER — SOLIFENACIN SUCCINATE 10 MG/1
10 TABLET, FILM COATED ORAL DAILY
Qty: 30 TABLET | Refills: 3 | COMMUNITY
Start: 2018-01-26 | End: 2018-02-28 | Stop reason: SDUPTHER

## 2018-01-26 NOTE — TELEPHONE ENCOUNTER
Aishwarya King at [facility] called to say that the escript for patients adderall says to take one daily but the dispense qty is 60?  Please advise

## 2018-01-26 NOTE — TELEPHONE ENCOUNTER
Next Visit Date:  Future Appointments  Date Time Provider Ruddy Micheli   2/23/2018 11:00 AM SCHEDULE, [facility]  [facility]   7/27/2018 1:00 PM Amelie Hutchins  [address] [facility] Maintenance   Topic Date Due    HIV screen  12/09/1986    Cervical cancer screen  12/09/1992    Lipid screen  10/13/2022    DTaP/Tdap/Td vaccine (2 - Td) 10/01/2024    Flu vaccine  Completed       No results found for: LABA1C          ( goal A1C is < 7)   No results found for: LABMICR  LDL Cholesterol (mg/dL)   Date Value   10/13/2017 90       (goal LDL is <100)   AST (U/L)   Date Value   10/13/2017 13     ALT (U/L)   Date Value   10/13/2017 8     BUN (mg/dL)   Date Value   10/13/2017 16     BP Readings from Last 3 Encounters:   01/26/18 118/78   01/06/18 (!) 154/86   10/03/17 138/78          (goal 120/80)    All Future Testing planned in [facility]              There is no problem list on file for this patient.

## 2018-02-26 ENCOUNTER — NURSE ONLY (OUTPATIENT)
Dept: FAMILY MEDICINE CLINIC | Age: 48
End: 2018-02-26

## 2018-02-26 VITALS
WEIGHT: 293 LBS | HEART RATE: 80 BPM | DIASTOLIC BLOOD PRESSURE: 70 MMHG | SYSTOLIC BLOOD PRESSURE: 114 MMHG | BODY MASS INDEX: 69.26 KG/M2

## 2018-02-26 DIAGNOSIS — I10 ESSENTIAL HYPERTENSION: Primary | ICD-10-CM

## 2018-02-28 DIAGNOSIS — F98.8 ATTENTION DEFICIT DISORDER (ADD) WITHOUT HYPERACTIVITY: ICD-10-CM

## 2018-02-28 DIAGNOSIS — M25.551 RIGHT HIP PAIN: ICD-10-CM

## 2018-02-28 RX ORDER — DEXTROAMPHETAMINE SACCHARATE, AMPHETAMINE ASPARTATE, DEXTROAMPHETAMINE SULFATE AND AMPHETAMINE SULFATE 5; 5; 5; 5 MG/1; MG/1; MG/1; MG/1
20 TABLET ORAL 2 TIMES DAILY
Qty: 60 TABLET | Refills: 0 | Status: SHIPPED | OUTPATIENT
Start: 2018-02-28 | End: 2018-04-05 | Stop reason: SDUPTHER

## 2018-02-28 RX ORDER — SOLIFENACIN SUCCINATE 10 MG/1
10 TABLET, FILM COATED ORAL DAILY
Qty: 90 TABLET | Refills: 3 | Status: SHIPPED | OUTPATIENT
Start: 2018-02-28 | End: 2018-10-15 | Stop reason: SDUPTHER

## 2018-02-28 RX ORDER — HYDROCODONE BITARTRATE AND ACETAMINOPHEN 5; 325 MG/1; MG/1
1-2 TABLET ORAL EVERY 6 HOURS PRN
Qty: 50 TABLET | Refills: 0 | Status: SHIPPED | OUTPATIENT
Start: 2018-02-28 | End: 2018-04-06 | Stop reason: SDUPTHER

## 2018-04-05 DIAGNOSIS — F98.8 ATTENTION DEFICIT DISORDER (ADD) WITHOUT HYPERACTIVITY: ICD-10-CM

## 2018-04-05 RX ORDER — DEXTROAMPHETAMINE SACCHARATE, AMPHETAMINE ASPARTATE, DEXTROAMPHETAMINE SULFATE AND AMPHETAMINE SULFATE 5; 5; 5; 5 MG/1; MG/1; MG/1; MG/1
20 TABLET ORAL 2 TIMES DAILY
Qty: 60 TABLET | Refills: 0 | Status: SHIPPED | OUTPATIENT
Start: 2018-04-05 | End: 2018-05-04 | Stop reason: SDUPTHER

## 2018-04-06 DIAGNOSIS — M25.551 RIGHT HIP PAIN: ICD-10-CM

## 2018-04-06 RX ORDER — HYDROCODONE BITARTRATE AND ACETAMINOPHEN 5; 325 MG/1; MG/1
1-2 TABLET ORAL EVERY 6 HOURS PRN
Qty: 50 TABLET | Refills: 0 | Status: SHIPPED | OUTPATIENT
Start: 2018-04-06 | End: 2018-04-13

## 2018-05-04 DIAGNOSIS — M25.551 RIGHT HIP PAIN: ICD-10-CM

## 2018-05-04 DIAGNOSIS — F98.8 ATTENTION DEFICIT DISORDER (ADD) WITHOUT HYPERACTIVITY: ICD-10-CM

## 2018-05-04 RX ORDER — HYDROCODONE BITARTRATE AND ACETAMINOPHEN 5; 325 MG/1; MG/1
1-2 TABLET ORAL EVERY 6 HOURS PRN
Qty: 50 TABLET | Refills: 0 | Status: SHIPPED | OUTPATIENT
Start: 2018-05-04 | End: 2018-10-15 | Stop reason: SDUPTHER

## 2018-05-04 RX ORDER — DEXTROAMPHETAMINE SACCHARATE, AMPHETAMINE ASPARTATE, DEXTROAMPHETAMINE SULFATE AND AMPHETAMINE SULFATE 5; 5; 5; 5 MG/1; MG/1; MG/1; MG/1
20 TABLET ORAL 2 TIMES DAILY
Qty: 60 TABLET | Refills: 0 | Status: SHIPPED | OUTPATIENT
Start: 2018-05-11 | End: 2018-10-15 | Stop reason: SDUPTHER

## 2018-06-12 RX ORDER — TOLTERODINE TARTRATE 2 MG/1
2 TABLET, EXTENDED RELEASE ORAL 3 TIMES DAILY
Qty: 90 TABLET | Refills: 3 | Status: SHIPPED | OUTPATIENT
Start: 2018-06-12 | End: 2019-01-02

## 2018-06-26 DIAGNOSIS — M54.50 CHRONIC BILATERAL LOW BACK PAIN WITHOUT SCIATICA: Primary | ICD-10-CM

## 2018-06-26 DIAGNOSIS — G89.29 CHRONIC BILATERAL LOW BACK PAIN WITHOUT SCIATICA: Primary | ICD-10-CM

## 2018-06-26 RX ORDER — HYDROCODONE BITARTRATE AND ACETAMINOPHEN 5; 325 MG/1; MG/1
1 TABLET ORAL EVERY 6 HOURS PRN
Qty: 50 TABLET | Refills: 0 | Status: SHIPPED | OUTPATIENT
Start: 2018-06-26 | End: 2018-07-26

## 2018-10-15 DIAGNOSIS — F98.8 ATTENTION DEFICIT DISORDER (ADD) WITHOUT HYPERACTIVITY: ICD-10-CM

## 2018-10-15 DIAGNOSIS — M25.551 RIGHT HIP PAIN: ICD-10-CM

## 2018-10-15 DIAGNOSIS — I10 ESSENTIAL HYPERTENSION: ICD-10-CM

## 2018-10-15 RX ORDER — PROMETHAZINE HYDROCHLORIDE 12.5 MG/1
12.5 TABLET ORAL EVERY 6 HOURS PRN
Qty: 60 TABLET | Refills: 5 | Status: SHIPPED | OUTPATIENT
Start: 2018-10-15 | End: 2019-08-02 | Stop reason: ALTCHOICE

## 2018-10-15 RX ORDER — DEXTROAMPHETAMINE SACCHARATE, AMPHETAMINE ASPARTATE, DEXTROAMPHETAMINE SULFATE AND AMPHETAMINE SULFATE 5; 5; 5; 5 MG/1; MG/1; MG/1; MG/1
20 TABLET ORAL 2 TIMES DAILY
Qty: 60 TABLET | Refills: 0 | Status: SHIPPED | OUTPATIENT
Start: 2018-10-15 | End: 2019-01-02 | Stop reason: SDUPTHER

## 2018-10-15 RX ORDER — NAPROXEN 500 MG/1
500 TABLET ORAL 2 TIMES DAILY WITH MEALS
Qty: 180 TABLET | Refills: 3 | Status: SHIPPED | OUTPATIENT
Start: 2018-10-15 | End: 2019-01-12

## 2018-10-15 RX ORDER — HYDROCODONE BITARTRATE AND ACETAMINOPHEN 5; 325 MG/1; MG/1
1-2 TABLET ORAL EVERY 6 HOURS PRN
Qty: 40 TABLET | Refills: 0 | Status: SHIPPED | OUTPATIENT
Start: 2018-10-15 | End: 2019-01-02 | Stop reason: SDUPTHER

## 2018-10-15 RX ORDER — AMLODIPINE BESYLATE 5 MG/1
5 TABLET ORAL DAILY
Qty: 90 TABLET | Refills: 3 | Status: SHIPPED | OUTPATIENT
Start: 2018-10-15 | End: 2019-08-02 | Stop reason: SDUPTHER

## 2018-10-15 RX ORDER — SOLIFENACIN SUCCINATE 10 MG/1
10 TABLET, FILM COATED ORAL DAILY
Qty: 90 TABLET | Refills: 3 | Status: SHIPPED | OUTPATIENT
Start: 2018-10-15 | End: 2018-10-17 | Stop reason: SDUPTHER

## 2018-10-15 NOTE — TELEPHONE ENCOUNTER
Health Maintenance   Topic Date Due    HIV screen  12/09/1986    Cervical cancer screen  12/09/1992    Flu vaccine (1) 09/01/2018    Lipid screen  10/13/2022    DTaP/Tdap/Td vaccine (2 - Tdap) 10/01/2024       No results found for: LABA1C          ( goal A1C is < 7)   No results found for: LABMICR  LDL Cholesterol (mg/dL)   Date Value   10/13/2017 90       (goal LDL is <100)   AST (U/L)   Date Value   10/13/2017 13     ALT (U/L)   Date Value   10/13/2017 8     BUN (mg/dL)   Date Value   10/13/2017 16     BP Readings from Last 3 Encounters:   02/26/18 114/70   01/26/18 118/78   01/06/18 (!) 154/86          (goal 120/80)    All Future Testing planned in [facility]      Next Visit Date:  Future Appointments  Date Time Provider Ruddy Taylor   11/26/2018 11:00 AM MD JOSE Barnhart [facility]            There is no problem list on file for this patient.

## 2018-10-17 RX ORDER — SOLIFENACIN SUCCINATE 10 MG/1
10 TABLET, FILM COATED ORAL DAILY
Qty: 90 TABLET | Refills: 3 | Status: SHIPPED | OUTPATIENT
Start: 2018-10-17 | End: 2018-10-18

## 2018-10-18 ENCOUNTER — TELEPHONE (OUTPATIENT)
Dept: FAMILY MEDICINE CLINIC | Age: 48
End: 2018-10-18

## 2018-10-18 RX ORDER — OXYBUTYNIN CHLORIDE 15 MG/1
15 TABLET, EXTENDED RELEASE ORAL DAILY
Qty: 90 TABLET | Refills: 3 | Status: SHIPPED | OUTPATIENT
Start: 2018-10-18 | End: 2019-01-02 | Stop reason: ALTCHOICE

## 2019-01-02 ENCOUNTER — OFFICE VISIT (OUTPATIENT)
Dept: FAMILY MEDICINE CLINIC | Age: 49
End: 2019-01-02
Payer: COMMERCIAL

## 2019-01-02 ENCOUNTER — TELEPHONE (OUTPATIENT)
Dept: FAMILY MEDICINE CLINIC | Age: 49
End: 2019-01-02

## 2019-01-02 VITALS
OXYGEN SATURATION: 96 % | BODY MASS INDEX: 71.58 KG/M2 | HEART RATE: 75 BPM | DIASTOLIC BLOOD PRESSURE: 86 MMHG | WEIGHT: 293 LBS | SYSTOLIC BLOOD PRESSURE: 124 MMHG

## 2019-01-02 DIAGNOSIS — F98.8 ATTENTION DEFICIT DISORDER (ADD) WITHOUT HYPERACTIVITY: ICD-10-CM

## 2019-01-02 DIAGNOSIS — N39.3 STRESS INCONTINENCE: ICD-10-CM

## 2019-01-02 DIAGNOSIS — G47.19 EXCESSIVE DAYTIME SLEEPINESS: ICD-10-CM

## 2019-01-02 DIAGNOSIS — M25.551 RIGHT HIP PAIN: ICD-10-CM

## 2019-01-02 DIAGNOSIS — E66.01 CLASS 3 SEVERE OBESITY WITHOUT SERIOUS COMORBIDITY IN ADULT, UNSPECIFIED BMI, UNSPECIFIED OBESITY TYPE (HCC): Primary | ICD-10-CM

## 2019-01-02 PROCEDURE — G8417 CALC BMI ABV UP PARAM F/U: HCPCS | Performed by: FAMILY MEDICINE

## 2019-01-02 PROCEDURE — G8427 DOCREV CUR MEDS BY ELIG CLIN: HCPCS | Performed by: FAMILY MEDICINE

## 2019-01-02 PROCEDURE — G8484 FLU IMMUNIZE NO ADMIN: HCPCS | Performed by: FAMILY MEDICINE

## 2019-01-02 PROCEDURE — 1036F TOBACCO NON-USER: CPT | Performed by: FAMILY MEDICINE

## 2019-01-02 PROCEDURE — 99214 OFFICE O/P EST MOD 30 MIN: CPT | Performed by: FAMILY MEDICINE

## 2019-01-02 RX ORDER — DEXTROAMPHETAMINE SACCHARATE, AMPHETAMINE ASPARTATE, DEXTROAMPHETAMINE SULFATE AND AMPHETAMINE SULFATE 5; 5; 5; 5 MG/1; MG/1; MG/1; MG/1
20 TABLET ORAL 2 TIMES DAILY
Qty: 60 TABLET | Refills: 0 | Status: SHIPPED | OUTPATIENT
Start: 2019-01-02 | End: 2019-02-14 | Stop reason: SDUPTHER

## 2019-01-02 RX ORDER — HYDROCODONE BITARTRATE AND ACETAMINOPHEN 5; 325 MG/1; MG/1
1-2 TABLET ORAL EVERY 6 HOURS PRN
Qty: 40 TABLET | Refills: 0 | Status: SHIPPED | OUTPATIENT
Start: 2019-01-02 | End: 2019-02-14 | Stop reason: SDUPTHER

## 2019-01-02 RX ORDER — OXYBUTYNIN CHLORIDE 15 MG/1
15 TABLET, EXTENDED RELEASE ORAL 2 TIMES DAILY
Qty: 90 TABLET | Refills: 3 | Status: SHIPPED | OUTPATIENT
Start: 2019-01-02 | End: 2019-02-15 | Stop reason: SDUPTHER

## 2019-01-02 ASSESSMENT — ENCOUNTER SYMPTOMS
CHEST TIGHTNESS: 0
CONSTIPATION: 0
COLOR CHANGE: 0
BLOOD IN STOOL: 0
ABDOMINAL DISTENTION: 0
EYE DISCHARGE: 0
COUGH: 0
VOICE CHANGE: 0
EYE REDNESS: 0
BACK PAIN: 0
DIARRHEA: 0
ABDOMINAL PAIN: 0
SINUS PRESSURE: 0
EYE ITCHING: 0
PHOTOPHOBIA: 0
NAUSEA: 0
SHORTNESS OF BREATH: 0
EYE PAIN: 0
WHEEZING: 0
VOMITING: 0
SORE THROAT: 0
RHINORRHEA: 0
FACIAL SWELLING: 0

## 2019-01-02 ASSESSMENT — PATIENT HEALTH QUESTIONNAIRE - PHQ9
2. FEELING DOWN, DEPRESSED OR HOPELESS: 0
SUM OF ALL RESPONSES TO PHQ9 QUESTIONS 1 & 2: 0
1. LITTLE INTEREST OR PLEASURE IN DOING THINGS: 0
SUM OF ALL RESPONSES TO PHQ QUESTIONS 1-9: 0
SUM OF ALL RESPONSES TO PHQ QUESTIONS 1-9: 0

## 2019-01-12 ENCOUNTER — APPOINTMENT (OUTPATIENT)
Dept: GENERAL RADIOLOGY | Age: 49
End: 2019-01-12
Payer: OTHER MISCELLANEOUS

## 2019-01-12 ENCOUNTER — HOSPITAL ENCOUNTER (EMERGENCY)
Age: 49
Discharge: HOME | End: 2019-01-12
Attending: EMERGENCY MEDICINE
Payer: OTHER MISCELLANEOUS

## 2019-01-12 VITALS
HEIGHT: 64 IN | OXYGEN SATURATION: 100 % | HEART RATE: 82 BPM | BODY MASS INDEX: 50.02 KG/M2 | RESPIRATION RATE: 16 BRPM | WEIGHT: 293 LBS | DIASTOLIC BLOOD PRESSURE: 90 MMHG | SYSTOLIC BLOOD PRESSURE: 142 MMHG | TEMPERATURE: 97.9 F

## 2019-01-12 DIAGNOSIS — R03.0 ELEVATED BLOOD PRESSURE READING: ICD-10-CM
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
## 2019-01-12 PROCEDURE — 96372 THER/PROPH/DIAG INJ SC/IM: CPT

## 2019-01-12 PROCEDURE — 6360000002 HC RX W HCPCS: Performed by: PHYSICIAN ASSISTANT

## 2019-01-12 PROCEDURE — 72100 X-RAY EXAM L-S SPINE 2/3 VWS: CPT

## 2019-01-12 PROCEDURE — 72040 X-RAY EXAM NECK SPINE 2-3 VW: CPT

## 2019-01-12 PROCEDURE — 99283 EMERGENCY DEPT VISIT LOW MDM: CPT

## 2019-01-12 RX ORDER — NAPROXEN 500 MG/1
500 TABLET ORAL 2 TIMES DAILY WITH MEALS
Qty: 20 TABLET | Refills: 0 | Status: SHIPPED | OUTPATIENT
Start: 2019-01-12 | End: 2020-08-13

## 2019-01-12 RX ORDER — METHOCARBAMOL 750 MG/1
750 TABLET, FILM COATED ORAL 4 TIMES DAILY
Qty: 40 TABLET | Refills: 0 | Status: SHIPPED | OUTPATIENT
Start: 2019-01-12 | End: 2019-01-22

## 2019-01-12 RX ORDER — ONDANSETRON 4 MG/1
4 TABLET, ORALLY DISINTEGRATING ORAL ONCE
Status: COMPLETED | OUTPATIENT
Start: 2019-01-12 | End: 2019-01-12

## 2019-01-12 RX ORDER — KETOROLAC TROMETHAMINE 30 MG/ML
60 INJECTION, SOLUTION INTRAMUSCULAR; INTRAVENOUS ONCE
Status: COMPLETED | OUTPATIENT
Start: 2019-01-12 | End: 2019-01-12

## 2019-01-12 RX ADMIN — KETOROLAC TROMETHAMINE 60 MG: 30 INJECTION, SOLUTION INTRAMUSCULAR at 13:48

## 2019-01-12 RX ADMIN — ONDANSETRON 4 MG: 4 TABLET, ORALLY DISINTEGRATING ORAL at 13:49

## 2019-01-12 ASSESSMENT — PAIN SCALES - GENERAL
PAINLEVEL_OUTOF10: 8
PAINLEVEL_OUTOF10: 8

## 2019-01-14 ENCOUNTER — TELEPHONE (OUTPATIENT)
Dept: FAMILY MEDICINE CLINIC | Age: 49
End: 2019-01-14

## 2019-02-14 DIAGNOSIS — F98.8 ATTENTION DEFICIT DISORDER (ADD) WITHOUT HYPERACTIVITY: ICD-10-CM

## 2019-02-14 DIAGNOSIS — M25.551 RIGHT HIP PAIN: ICD-10-CM

## 2019-02-14 RX ORDER — DEXTROAMPHETAMINE SACCHARATE, AMPHETAMINE ASPARTATE, DEXTROAMPHETAMINE SULFATE AND AMPHETAMINE SULFATE 5; 5; 5; 5 MG/1; MG/1; MG/1; MG/1
20 TABLET ORAL 2 TIMES DAILY
Qty: 60 TABLET | Refills: 0 | Status: SHIPPED | OUTPATIENT
Start: 2019-02-14 | End: 2019-03-21 | Stop reason: SDUPTHER

## 2019-02-14 RX ORDER — HYDROCODONE BITARTRATE AND ACETAMINOPHEN 5; 325 MG/1; MG/1
1-2 TABLET ORAL EVERY 6 HOURS PRN
Qty: 40 TABLET | Refills: 0 | Status: SHIPPED | OUTPATIENT
Start: 2019-02-14 | End: 2019-03-21 | Stop reason: SDUPTHER

## 2019-02-15 RX ORDER — OXYBUTYNIN CHLORIDE 15 MG/1
15 TABLET, EXTENDED RELEASE ORAL 2 TIMES DAILY
Qty: 180 TABLET | Refills: 3 | Status: SHIPPED | OUTPATIENT
Start: 2019-02-15 | End: 2019-04-23 | Stop reason: SDUPTHER

## 2019-02-18 RX ORDER — CLOCORTOLONE PIVALATE 0 G/G
3 CREAM TOPICAL DAILY
Qty: 75 G | Refills: 1 | Status: SHIPPED | OUTPATIENT
Start: 2019-02-18 | End: 2020-08-13

## 2019-02-18 RX ORDER — DIPHENHYDRAMINE HCL 25 MG
25 CAPSULE ORAL EVERY 6 HOURS PRN
Qty: 120 CAPSULE | Refills: 0 | Status: SHIPPED | OUTPATIENT
Start: 2019-02-18 | End: 2019-02-28

## 2019-02-25 RX ORDER — OMEPRAZOLE 40 MG/1
40 CAPSULE, DELAYED RELEASE ORAL
Qty: 30 CAPSULE | Refills: 5 | Status: SHIPPED | OUTPATIENT
Start: 2019-02-25 | End: 2019-04-16 | Stop reason: SDUPTHER

## 2019-03-20 DIAGNOSIS — F98.8 ATTENTION DEFICIT DISORDER (ADD) WITHOUT HYPERACTIVITY: ICD-10-CM

## 2019-03-20 DIAGNOSIS — M25.551 RIGHT HIP PAIN: ICD-10-CM

## 2019-03-21 RX ORDER — HYDROCODONE BITARTRATE AND ACETAMINOPHEN 5; 325 MG/1; MG/1
1-2 TABLET ORAL EVERY 6 HOURS PRN
Qty: 40 TABLET | Refills: 0 | Status: SHIPPED | OUTPATIENT
Start: 2019-03-21 | End: 2019-03-28

## 2019-03-21 RX ORDER — DEXTROAMPHETAMINE SACCHARATE, AMPHETAMINE ASPARTATE, DEXTROAMPHETAMINE SULFATE AND AMPHETAMINE SULFATE 5; 5; 5; 5 MG/1; MG/1; MG/1; MG/1
20 TABLET ORAL 2 TIMES DAILY
Qty: 60 TABLET | Refills: 0 | Status: SHIPPED | OUTPATIENT
Start: 2019-03-21 | End: 2019-08-02 | Stop reason: ALTCHOICE

## 2019-04-02 ENCOUNTER — OFFICE VISIT (OUTPATIENT)
Dept: FAMILY MEDICINE CLINIC | Age: 49
End: 2019-04-02

## 2019-04-02 VITALS
SYSTOLIC BLOOD PRESSURE: 122 MMHG | BODY MASS INDEX: 50.02 KG/M2 | HEART RATE: 88 BPM | DIASTOLIC BLOOD PRESSURE: 78 MMHG | HEIGHT: 64 IN | WEIGHT: 293 LBS | OXYGEN SATURATION: 98 %

## 2019-04-02 DIAGNOSIS — E66.01 CLASS 3 SEVERE OBESITY WITHOUT SERIOUS COMORBIDITY IN ADULT, UNSPECIFIED BMI, UNSPECIFIED OBESITY TYPE (HCC): Primary | ICD-10-CM

## 2019-04-02 DIAGNOSIS — M16.51 POST-TRAUMATIC OSTEOARTHRITIS OF RIGHT HIP: ICD-10-CM

## 2019-04-02 PROCEDURE — G8417 CALC BMI ABV UP PARAM F/U: HCPCS | Performed by: FAMILY MEDICINE

## 2019-04-02 PROCEDURE — G8427 DOCREV CUR MEDS BY ELIG CLIN: HCPCS | Performed by: FAMILY MEDICINE

## 2019-04-02 PROCEDURE — 99213 OFFICE O/P EST LOW 20 MIN: CPT | Performed by: FAMILY MEDICINE

## 2019-04-02 PROCEDURE — 1036F TOBACCO NON-USER: CPT | Performed by: FAMILY MEDICINE

## 2019-04-02 RX ORDER — OXYCODONE AND ACETAMINOPHEN 7.5; 325 MG/1; MG/1
1 TABLET ORAL EVERY 4 HOURS PRN
Qty: 40 TABLET | Refills: 0 | Status: SHIPPED | OUTPATIENT
Start: 2019-04-02 | End: 2019-04-09

## 2019-04-02 ASSESSMENT — ENCOUNTER SYMPTOMS
COUGH: 0
EYE DISCHARGE: 0
SORE THROAT: 0
WHEEZING: 0
COLOR CHANGE: 0
BLOOD IN STOOL: 0
SHORTNESS OF BREATH: 0
ABDOMINAL PAIN: 0
DIARRHEA: 0
VOMITING: 0
BACK PAIN: 0
CHEST TIGHTNESS: 0
FACIAL SWELLING: 0
EYE ITCHING: 0
NAUSEA: 0
EYE PAIN: 0
CONSTIPATION: 0
RHINORRHEA: 0
ABDOMINAL DISTENTION: 0
PHOTOPHOBIA: 0
EYE REDNESS: 0
VOICE CHANGE: 0
SINUS PRESSURE: 0

## 2019-04-02 NOTE — PROGRESS NOTES
mood and sleep disturbance. The patient is not nervous/anxious and is not hyperactive. Objective:   Physical Exam   Constitutional: She is oriented to person, place, and time. She appears well-developed and well-nourished. She does not appear ill. No distress. HENT:   Head: Normocephalic and atraumatic. Right Ear: External ear normal.   Left Ear: External ear normal.   Nose: Nose normal. No mucosal edema or rhinorrhea. Mouth/Throat: Mucous membranes are normal. No oropharyngeal exudate, posterior oropharyngeal edema or posterior oropharyngeal erythema. Eyes: Pupils are equal, round, and reactive to light. EOM are normal. Right eye exhibits no discharge. Left eye exhibits no discharge. No scleral icterus. Neck: Trachea normal and normal range of motion. Neck supple. No JVD present. Carotid bruit is not present. No tracheal deviation present. No thyromegaly present. Cardiovascular: Normal rate, regular rhythm, S1 normal, S2 normal, normal heart sounds and normal pulses. Exam reveals no gallop, no S3, no S4, no distant heart sounds and no friction rub. No murmur heard. Pulmonary/Chest: No respiratory distress. She has no decreased breath sounds. She has no wheezes. She has no rhonchi. She has no rales. Abdominal: Soft. Normal appearance and bowel sounds are normal. There is no hepatosplenomegaly. There is no tenderness. There is no CVA tenderness. No hernia. Lymphadenopathy:     She has no cervical adenopathy. Right cervical: No superficial cervical adenopathy present. Left cervical: No superficial cervical adenopathy present. Neurological: She is alert and oriented to person, place, and time. She has normal strength. No cranial nerve deficit or sensory deficit. Coordination and gait normal.   Reflex Scores:       Brachioradialis reflexes are 2+ on the right side and 2+ on the left side. Patellar reflexes are 2+ on the right side and 2+ on the left side.        Achilles reflexes are 2+ on the right side and 2+ on the left side. Skin: Skin is warm and dry. No lesion and no rash noted. She is not diaphoretic. No cyanosis. Nails show no clubbing. Psychiatric: She has a normal mood and affect. Her speech is normal and behavior is normal. Judgment and thought content normal. Cognition and memory are normal.     Vitals:    04/02/19 1352   BP: 122/78   Pulse: 88   SpO2: 98%   Weight: (!) 428 lb 3.2 oz (194.2 kg)   Height: 5' 4\" (1.626 m)       Assessment / Plan:   1. Class 3 severe obesity without serious comorbidity in adult, unspecified BMI, unspecified obesity type ([address]  Started saxenda, will start her in the bariatric program at [facility] and try to expedite surgery. - External Referral To Bariatrics  - liraglutide-weight management (SAXENDA) 18 MG/3ML SOPN; 3mg SC daily  Dispense: 2 pen; Refill: 5    2. Post-traumatic osteoarthritis of right hip  Focus for now will need to be on pain control and will need some titration of medication to control pain. She will keep in close contact with us about her pain and functional status. - oxyCODONE-acetaminophen (PERCOCET) 7.5-325 MG per tablet; Take 1 tablet by mouth every 4 hours as needed for Pain for up to 7 days. Dispense: 40 tablet;  Refill: 0

## 2019-04-02 NOTE — PROGRESS NOTES
Subjective:      Patient ID: Frankie Forbes is a 53 y.o. female. Visit Information    Have you changed or started any medications since your last visit including any over-the-counter medicines, vitamins, or herbal medicines? no   Are you having any side effects from any of your medications? -  no  Have you stopped taking any of your medications? Is so, why? -  no    Have you seen any other physician or provider since your last visit? No  Have you had any other diagnostic tests since your last visit? No  Have you been seen in the emergency room and/or had an admission to a hospital since we last saw you? No  Have you had your routine dental cleaning in the past 6 months? yes -     Have you activated your [employer] account? If not, what are your barriers?  No:      Patient Care Team:  Jeff Cochran MD as PCP - General (Family Medicine)    Medical History Review  Past Medical, Family, and Social History reviewed and  contribute to the patient presenting condition    Health Maintenance   Topic Date Due    HIV screen  12/09/1986    Cervical cancer screen  12/09/1992    Flu vaccine (Season Ended) 09/01/2019    Lipid screen  10/13/2022    DTaP/Tdap/Td vaccine (2 - Tdap) 10/01/2024       HPI    Review of Systems    Objective:   Physical Exam    Assessment / Plan:

## 2019-04-05 NOTE — TELEPHONE ENCOUNTER
Needs to go to speciality pharmacy           Next Visit Date:  Future Appointments   Date Time Provider Ruddy Taylor   4/16/2019 10:40 AM Edison Inman  [address] [facility]   Topic Date Due    HIV screen  12/09/1986    Cervical cancer screen  12/09/1992    Flu vaccine (Season Ended) 09/01/2019    Lipid screen  10/13/2022    DTaP/Tdap/Td vaccine (2 - Tdap) 10/01/2024       No results found for: LABA1C          ( goal A1C is < 7)   No results found for: LABMICR  LDL Cholesterol (mg/dL)   Date Value   10/13/2017 90       (goal LDL is <100)   AST (U/L)   Date Value   10/13/2017 13     ALT (U/L)   Date Value   10/13/2017 8     BUN (mg/dL)   Date Value   10/13/2017 16     BP Readings from Last 3 Encounters:   04/02/19 122/78   01/12/19 (!) 142/90   01/02/19 124/86          (goal 120/80)    All Future Testing planned in [facility]  Lab Frequency Next Occurrence   Apnea Link Screening (THIS IS NOT Kailee Radha [address] Only Once 01/16/2019               There is no problem list on file for this patient.

## 2019-04-08 ENCOUNTER — APPOINTMENT (OUTPATIENT)
Dept: CT IMAGING | Age: 49
End: 2019-04-08
Payer: COMMERCIAL

## 2019-04-08 ENCOUNTER — HOSPITAL ENCOUNTER (EMERGENCY)
Age: 49
Discharge: HOME | End: 2019-04-08
Attending: EMERGENCY MEDICINE
Payer: COMMERCIAL

## 2019-04-08 VITALS
SYSTOLIC BLOOD PRESSURE: 142 MMHG | HEIGHT: 64 IN | HEART RATE: 92 BPM | OXYGEN SATURATION: 100 % | DIASTOLIC BLOOD PRESSURE: 81 MMHG | WEIGHT: 293 LBS | RESPIRATION RATE: 18 BRPM | TEMPERATURE: 98.3 F | BODY MASS INDEX: 50.02 KG/M2

## 2019-04-08 DIAGNOSIS — R11.2 NAUSEA VOMITING AND DIARRHEA: Primary | ICD-10-CM

## 2019-04-08 DIAGNOSIS — R19.7 NAUSEA VOMITING AND DIARRHEA: Primary | ICD-10-CM

## 2019-04-08 DIAGNOSIS — R10.9 ABDOMINAL PAIN, UNSPECIFIED ABDOMINAL LOCATION: ICD-10-CM

## 2019-04-08 LAB
ABSOLUTE EOS #: 0.2 K/UL (ref 0–0.4)
ABSOLUTE IMMATURE GRANULOCYTE: ABNORMAL K/UL (ref 0–0.3)
ABSOLUTE LYMPH #: 1.4 K/UL (ref 1–4.8)
ABSOLUTE MONO #: 0.5 K/UL (ref 0.2–0.8)
ALBUMIN SERPL-MCNC: 4 G/DL (ref 3.5–5.2)
ALBUMIN/GLOBULIN RATIO: NORMAL (ref 1–2.5)
ALP BLD-CCNC: 65 U/L (ref 35–104)
ALT SERPL-CCNC: 9 U/L (ref 5–33)
ANION GAP SERPL CALCULATED.3IONS-SCNC: 12 MMOL/L (ref 9–17)
AST SERPL-CCNC: 16 U/L
BASOPHILS # BLD: 2 % (ref 0–2)
BASOPHILS ABSOLUTE: 0.1 K/UL (ref 0–0.2)
BILIRUB SERPL-MCNC: 0.4 MG/DL (ref 0.3–1.2)
BILIRUBIN DIRECT: 0.09 MG/DL
BILIRUBIN, INDIRECT: 0.31 MG/DL (ref 0–1)
BUN BLDV-MCNC: 13 MG/DL (ref 6–20)
BUN/CREAT BLD: 18 (ref 9–20)
CALCIUM SERPL-MCNC: 8.6 MG/DL (ref 8.6–10.4)
CHLORIDE BLD-SCNC: 104 MMOL/L (ref 98–107)
CHP ED QC CHECK: YES
CO2: 22 MMOL/L (ref 20–31)
CREAT SERPL-MCNC: 0.71 MG/DL (ref 0.5–0.9)
DIFFERENTIAL TYPE: ABNORMAL
EOSINOPHILS RELATIVE PERCENT: 4 % (ref 1–4)
GFR AFRICAN AMERICAN: >60 ML/MIN
GFR NON-AFRICAN AMERICAN: >60 ML/MIN
GFR SERPL CREATININE-BSD FRML MDRD: ABNORMAL ML/MIN/{1.73_M2}
GFR SERPL CREATININE-BSD FRML MDRD: ABNORMAL ML/MIN/{1.73_M2}
GLOBULIN: NORMAL G/DL (ref 1.5–3.8)
GLUCOSE BLD-MCNC: 98 MG/DL (ref 70–99)
HCT VFR BLD CALC: 37.2 % (ref 36–46)
HEMOGLOBIN: 12.6 G/DL (ref 12–16)
IMMATURE GRANULOCYTES: ABNORMAL %
LIPASE: 8 U/L (ref 13–60)
LYMPHOCYTES # BLD: 32 % (ref 24–44)
MCH RBC QN AUTO: 28.5 PG (ref 26–34)
MCHC RBC AUTO-ENTMCNC: 34 G/DL (ref 31–37)
MCV RBC AUTO: 83.9 FL (ref 80–100)
MONOCYTES # BLD: 12 % (ref 1–7)
NRBC AUTOMATED: ABNORMAL PER 100 WBC
PDW BLD-RTO: 14.9 % (ref 11.5–14.5)
PLATELET # BLD: 250 K/UL (ref 130–400)
PLATELET ESTIMATE: ABNORMAL
PMV BLD AUTO: 8.2 FL (ref 6–12)
POTASSIUM SERPL-SCNC: 3.3 MMOL/L (ref 3.7–5.3)
PREGNANCY TEST URINE, POC: NORMAL
RBC # BLD: 4.43 M/UL (ref 4–5.2)
RBC # BLD: ABNORMAL 10*6/UL
SEG NEUTROPHILS: 50 % (ref 36–66)
SEGMENTED NEUTROPHILS ABSOLUTE COUNT: 2.3 K/UL (ref 1.8–7.7)
SODIUM BLD-SCNC: 138 MMOL/L (ref 135–144)
TOTAL PROTEIN: 7.6 G/DL (ref 6.4–8.3)
WBC # BLD: 4.5 K/UL (ref 3.5–11)
WBC # BLD: ABNORMAL 10*3/UL

## 2019-04-08 PROCEDURE — 99284 EMERGENCY DEPT VISIT MOD MDM: CPT

## 2019-04-08 PROCEDURE — 83690 ASSAY OF LIPASE: CPT

## 2019-04-08 PROCEDURE — 85025 COMPLETE CBC W/AUTO DIFF WBC: CPT

## 2019-04-08 PROCEDURE — 96361 HYDRATE IV INFUSION ADD-ON: CPT

## 2019-04-08 PROCEDURE — 87493 C DIFF AMPLIFIED PROBE: CPT

## 2019-04-08 PROCEDURE — 96374 THER/PROPH/DIAG INJ IV PUSH: CPT

## 2019-04-08 PROCEDURE — 82272 OCCULT BLD FECES 1-3 TESTS: CPT

## 2019-04-08 PROCEDURE — 87506 IADNA-DNA/RNA PROBE TQ 6-11: CPT

## 2019-04-08 PROCEDURE — 6360000002 HC RX W HCPCS: Performed by: NURSE PRACTITIONER

## 2019-04-08 PROCEDURE — 2580000003 HC RX 258: Performed by: NURSE PRACTITIONER

## 2019-04-08 PROCEDURE — 80048 BASIC METABOLIC PNL TOTAL CA: CPT

## 2019-04-08 PROCEDURE — 81003 URINALYSIS AUTO W/O SCOPE: CPT

## 2019-04-08 PROCEDURE — 84703 CHORIONIC GONADOTROPIN ASSAY: CPT

## 2019-04-08 PROCEDURE — 74176 CT ABD & PELVIS W/O CONTRAST: CPT

## 2019-04-08 PROCEDURE — 2500000003 HC RX 250 WO HCPCS: Performed by: NURSE PRACTITIONER

## 2019-04-08 PROCEDURE — 80076 HEPATIC FUNCTION PANEL: CPT

## 2019-04-08 PROCEDURE — 96375 TX/PRO/DX INJ NEW DRUG ADDON: CPT

## 2019-04-08 RX ORDER — 0.9 % SODIUM CHLORIDE 0.9 %
1000 INTRAVENOUS SOLUTION INTRAVENOUS ONCE
Status: COMPLETED | OUTPATIENT
Start: 2019-04-08 | End: 2019-04-08

## 2019-04-08 RX ORDER — ONDANSETRON 2 MG/ML
4 INJECTION INTRAMUSCULAR; INTRAVENOUS ONCE
Status: COMPLETED | OUTPATIENT
Start: 2019-04-08 | End: 2019-04-08

## 2019-04-08 RX ORDER — ONDANSETRON 4 MG/1
4 TABLET, ORALLY DISINTEGRATING ORAL EVERY 8 HOURS PRN
Qty: 20 TABLET | Refills: 0 | Status: SHIPPED | OUTPATIENT
Start: 2019-04-08 | End: 2020-08-13

## 2019-04-08 RX ADMIN — ONDANSETRON 4 MG: 2 INJECTION INTRAMUSCULAR; INTRAVENOUS at 19:27

## 2019-04-08 RX ADMIN — FAMOTIDINE 20 MG: 10 INJECTION, SOLUTION INTRAVENOUS at 19:27

## 2019-04-08 RX ADMIN — SODIUM CHLORIDE 1000 ML: 9 INJECTION, SOLUTION INTRAVENOUS at 19:27

## 2019-04-08 ASSESSMENT — ENCOUNTER SYMPTOMS
DIARRHEA: 1
NAUSEA: 1
ABDOMINAL PAIN: 1
SORE THROAT: 0
SINUS PRESSURE: 0
VOMITING: 1
RHINORRHEA: 0
SHORTNESS OF BREATH: 0
COLOR CHANGE: 0
COUGH: 0

## 2019-04-08 ASSESSMENT — PAIN DESCRIPTION - LOCATION: LOCATION: ABDOMEN

## 2019-04-08 ASSESSMENT — PAIN DESCRIPTION - PAIN TYPE: TYPE: ACUTE PAIN

## 2019-04-08 ASSESSMENT — PAIN SCALES - GENERAL: PAINLEVEL_OUTOF10: 8

## 2019-04-08 NOTE — ED PROVIDER NOTES
Team [address] ED  eMERGENCY dEPARTMENT eNCOUnter      Pt Name: Parmjit Corley  MRN: [id number]  [place] 1970  Date of evaluation: 4/8/2019  Provider: CASANDRA Bailey CNP    CHIEF COMPLAINT       Chief Complaint   Patient presents with    Abdominal Pain    Nausea    Emesis    Diarrhea         HISTORY OF PRESENT ILLNESS  (Location/Symptom, Timing/Onset, Context/Setting, Quality, Duration, Modifying Factors, Severity.)   Parmjit Corley is a 53 y.o. female who presents to the emergency department via private auto for N/V/D, upper abdominal pain. Onset was two days ago. Reports chills. Denies cough, sore throat, congestion, urinary symptoms. Rates her pain 8/10 at this time. Nursing Notes were reviewed. ALLERGIES     Clindamycin/lincomycin; Estrogens; Fish-derived products; Ibuprofen; Iodine; Medrol [methylprednisolone]; Metformin and related; Morphine; and Nsaids    CURRENT MEDICATIONS       Previous Medications    ALBUTEROL (PROVENTIL HFA) 108 (90 BASE) MCG/ACT INHALER    Inhale 1-2 puffs into the lungs every 4 hours as needed for Wheezing    AMLODIPINE (NORVASC) 5 MG TABLET    Take 1 tablet by mouth daily    AMPHETAMINE-DEXTROAMPHETAMINE (ADDERALL, 20MG,) 20 MG TABLET    Take 1 tablet by mouth 2 times daily for 30 days.     CLOCORTOLONE PIVALATE (CLODERM PUMP) 0.1 % CREA    Apply 3 g topically daily    LIRAGLUTIDE-WEIGHT MANAGEMENT (SAXENDA) 18 MG/3ML SOPN    3mg SC daily    NAPROXEN (NAPROSYN) 500 MG TABLET    Take 1 tablet by mouth 2 times daily (with meals)    NAPROXEN-ESOMEPRAZOLE (VIMOVO) 500-20 MG TBEC    Take 1 tablet by mouth 2 times daily    OMEPRAZOLE (PRILOSEC) 40 MG DELAYED RELEASE CAPSULE    Take 1 capsule by mouth every morning (before breakfast)    OXYBUTYNIN (DITROPAN XL) 15 MG EXTENDED RELEASE TABLET    Take 1 tablet by mouth 2 times daily    OXYCODONE-ACETAMINOPHEN (PERCOCET) 7.5-325 MG PER TABLET    Take 1 tablet by mouth every 4 hours as needed for Pain for up to 7 days.    PERMETHRIN (ELIMITE) 5 % CREAM    Apply topically as directed    PROMETHAZINE (PHENERGAN) 12.5 MG TABLET    Take 1 tablet by mouth every 6 hours as needed for Nausea       PAST MEDICAL HISTORY         Diagnosis Date    Anxiety     Asthma     Headache(784.0)     Hypertension        SURGICAL HISTORY           Procedure Laterality Date    GASTRIC BAND           FAMILY HISTORY           Problem Relation Age of Onset    Thyroid Disease Mother     Asthma Mother     Heart Failure Mother     COPD Mother     Heart Attack Father     Diabetes Father     Stroke Father      Family Status   Relation Name Status    Mother  Alive    Father          SOCIAL HISTORY      reports that she has never smoked. She has never used smokeless tobacco. She reports that she drinks alcohol. She reports that she does not use drugs. REVIEW OF SYSTEMS    (2-9 systems for level 4, 10 or more for level 5)     Review of Systems   Constitutional: Positive for chills. Negative for diaphoresis, fatigue and fever. HENT: Negative for congestion, ear discharge, ear pain, postnasal drip, rhinorrhea, sinus pressure and sore throat. Respiratory: Negative for cough and shortness of breath. Cardiovascular: Negative for chest pain and palpitations. Gastrointestinal: Positive for abdominal pain, diarrhea, nausea and vomiting. Genitourinary: Negative for dysuria, flank pain, frequency, hematuria and urgency. Musculoskeletal: Negative for arthralgias, myalgias, neck pain and neck stiffness. Skin: Negative for color change and rash. Neurological: Negative for dizziness, weakness, light-headedness and headaches. Except as noted above the remainder of the review of systems was reviewed and negative.      PHYSICAL EXAM    (up to 7 for level 4, 8 or more for level 5)     ED Triage Vitals [19 1620]   BP Temp Temp Source Pulse Resp SpO2 Height Weight   (!) 142/81 98.3 °F (36.8 °C) Oral 92 18 100 % 5' 4\" (1.626 m) (!) 404 lb (183.3 kg)     Physical Exam   Constitutional: She is oriented to person, place, and time. She appears well-developed and well-nourished. No distress. HENT:   Mouth/Throat: Oropharynx is clear and moist.   Eyes: Conjunctivae are normal.   Cardiovascular: Normal rate, regular rhythm and normal heart sounds. Pulmonary/Chest: Effort normal and breath sounds normal. No respiratory distress. She has no wheezes. She has no rales. Abdominal: Soft. Bowel sounds are normal. She exhibits no distension. There is no tenderness. There is no rebound and no guarding. Neurological: She is alert and oriented to person, place, and time. Skin: Skin is warm and dry. No rash noted. She is not diaphoretic. Psychiatric: She has a normal mood and affect. Her behavior is normal.   Vitals reviewed. DIAGNOSTIC RESULTS     RADIOLOGY:   Non-plain film images such as CT, Ultrasound and MRI are read by the radiologist. [facility] radiographic images are visualized and preliminarily interpreted by the emergency physician with the below findings:    Interpretation per the Radiologist below, if available at the time of this note:    Ct Abdomen Pelvis Wo Contrast Additional Contrast? None    Result Date: 4/8/2019  EXAMINATION: CT OF THE ABDOMEN AND PELVIS WITHOUT CONTRAST 4/8/2019 8:06 pm TECHNIQUE: CT of the abdomen and pelvis was performed without the administration of intravenous contrast. Multiplanar reformatted images are provided for review. Dose modulation, iterative reconstruction, and/or weight based adjustment of the mA/kV was utilized to reduce the radiation dose to as low as reasonably achievable. COMPARISON: None. HISTORY: ORDERING SYSTEM PROVIDED HISTORY: pain TECHNOLOGIST PROVIDED HISTORY: FINDINGS: Evaluation is limited by artifact from patient touching the scanner. Lower Chest: No free fluid. No bladder calculus. Organs: No aortic aneurysm. No adenopathy. GI/Bowel: Gastric banding. No bowel obstruction.   No acute inflammatory process. Pelvis: No free fluid. No bladder calculus. Peritoneum/Retroperitoneum: No aortic aneurysm. No adenopathy. Bones/Soft Tissues: No acute soft tissue abnormality. Severe right hip degenerative changes with increased sclerosis within the femoral head and suggestion of mild flattening. Lumbar spine degenerative changes. Limited evaluation due to artifact. No acute findings. Gastric banding. Severe right hip degenerative changes possibly on the basis of avascular necrosis. LABS:  Labs Reviewed   BASIC METABOLIC PANEL - Abnormal; Notable for the following components:       Result Value    Potassium 3.3 (*)     All other components within normal limits   CBC WITH AUTO DIFFERENTIAL - Abnormal; Notable for the following components:    RDW 14.9 (*)     Monocytes 12 (*)     All other components within normal limits   LIPASE - Abnormal; Notable for the following components:    Lipase 8 (*)     All other components within normal limits   POCT URINE PREGNANCY - Normal   CULTURE STOOL   C DIFF TOXIN B BY RT PCR   HEPATIC FUNCTION PANEL   BLOOD OCCULT STOOL SCREEN #1       All other labs were within normal range or not returned as of this dictation. EMERGENCY DEPARTMENT COURSE and DIFFERENTIAL DIAGNOSIS/MDM:   Vitals:    Vitals:    04/08/19 1620   BP: (!) 142/81   Pulse: 92   Resp: 18   Temp: 98.3 °F (36.8 °C)   TempSrc: Oral   SpO2: 100%   Weight: (!) 404 lb (183.3 kg)   Height: 5' 4\" (1.626 m)       MEDICATIONS GIVEN IN THE ED:  Medications   0.9 % sodium chloride bolus (0 mLs Intravenous Stopped 4/8/19 2054)   ondansetron (ZOFRAN) injection 4 mg (4 mg Intravenous Given 4/8/19 1927)   famotidine (PEPCID) injection 20 mg (20 mg Intravenous Given 4/8/19 1927)       CLINICAL DECISION MAKING:  The patient presented alert with a nontoxic appearance and was seen in conjunction with Dr. Kimberly Godoy. Laboratory studies were unremarkable. Imaging was negative for acute findings.  A prescription was written for Zofran. Follow up with pcp, return to ED if condition worsens. FINAL IMPRESSION      1. Nausea vomiting and diarrhea    2.  Abdominal pain, unspecified abdominal location              DISPOSITION/PLAN   DISPOSITION Decision To Discharge 04/08/2019 09:25:02 PM      PATIENT REFERRED TO:   Chad Ortega MD  [address]  [phone]    Schedule an appointment as soon as possible for a visit       [facility] ED  [address] [facility]  [phone]    As needed, If symptoms worsen      DISCHARGE MEDICATIONS:     New Prescriptions    ONDANSETRON (ZOFRAN ODT) 4 MG DISINTEGRATING TABLET    Take 1 tablet by mouth every 8 hours as needed for Nausea or Vomiting           (Please note that portions of this note were completed with a voice recognition program.  Efforts were made to edit the dictations but occasionally words are mis-transcribed.)    CASANDRA Cortez - CASANDRA Chapman CNP  04/09/19 3435

## 2019-04-09 ENCOUNTER — TELEPHONE (OUTPATIENT)
Dept: FAMILY MEDICINE CLINIC | Age: 49
End: 2019-04-09

## 2019-04-09 LAB
DATE, STOOL #1: ABNORMAL
DATE, STOOL #2: ABNORMAL
DATE, STOOL #3: ABNORMAL
HCG, PREGNANCY URINE (POC): NEGATIVE
HEMOCCULT SP1 STL QL: ABNORMAL
HEMOCCULT SP2 STL QL: ABNORMAL
HEMOCCULT SP3 STL QL: ABNORMAL
TIME, STOOL #1: ABNORMAL
TIME, STOOL #2: ABNORMAL
TIME, STOOL #3: ABNORMAL

## 2019-04-09 NOTE — TELEPHONE ENCOUNTER
[facility] Transitions Initial Follow Up Call    Outreach made within 2 business days of discharge: Yes    Patient: Tigre Rock Patient : 1970   MRN: [id number]  Reason for Admission: There are no discharge diagnoses documented for the most recent discharge. Discharge Date: 19       Spoke with: . [facility] for the patient to       Discharge department/facility: [facility]        Scheduled appointment with PCP within 7-14 days    Follow Up  Future Appointments   Date Time Provider Ruddy Taylor   2019 10:40 AM MD Viviana Angeles [address]

## 2019-04-10 ENCOUNTER — OFFICE VISIT (OUTPATIENT)
Dept: FAMILY MEDICINE CLINIC | Age: 49
End: 2019-04-10
Payer: COMMERCIAL

## 2019-04-10 VITALS
HEART RATE: 74 BPM | SYSTOLIC BLOOD PRESSURE: 124 MMHG | WEIGHT: 293 LBS | OXYGEN SATURATION: 96 % | DIASTOLIC BLOOD PRESSURE: 80 MMHG | BODY MASS INDEX: 71.92 KG/M2

## 2019-04-10 DIAGNOSIS — M25.551 PAIN OF RIGHT HIP JOINT: Primary | ICD-10-CM

## 2019-04-10 PROCEDURE — G8417 CALC BMI ABV UP PARAM F/U: HCPCS | Performed by: FAMILY MEDICINE

## 2019-04-10 PROCEDURE — 20610 DRAIN/INJ JOINT/BURSA W/O US: CPT | Performed by: FAMILY MEDICINE

## 2019-04-10 PROCEDURE — 1036F TOBACCO NON-USER: CPT | Performed by: FAMILY MEDICINE

## 2019-04-10 PROCEDURE — G8427 DOCREV CUR MEDS BY ELIG CLIN: HCPCS | Performed by: FAMILY MEDICINE

## 2019-04-10 PROCEDURE — 99213 OFFICE O/P EST LOW 20 MIN: CPT | Performed by: FAMILY MEDICINE

## 2019-04-10 RX ORDER — TRIAMCINOLONE ACETONIDE 40 MG/ML
120 INJECTION, SUSPENSION INTRA-ARTICULAR; INTRAMUSCULAR ONCE
Status: COMPLETED | OUTPATIENT
Start: 2019-04-10 | End: 2019-04-10

## 2019-04-10 RX ORDER — PREDNISONE 20 MG/1
TABLET ORAL
Qty: 18 TABLET | Refills: 0 | Status: SHIPPED | OUTPATIENT
Start: 2019-04-10 | End: 2019-04-16

## 2019-04-10 RX ORDER — LIDOCAINE HYDROCHLORIDE 10 MG/ML
2 INJECTION, SOLUTION INFILTRATION; PERINEURAL ONCE
Status: COMPLETED | OUTPATIENT
Start: 2019-04-10 | End: 2019-04-10

## 2019-04-10 RX ADMIN — LIDOCAINE HYDROCHLORIDE 2 ML: 10 INJECTION, SOLUTION INFILTRATION; PERINEURAL at 16:40

## 2019-04-10 RX ADMIN — TRIAMCINOLONE ACETONIDE 120 MG: 40 INJECTION, SUSPENSION INTRA-ARTICULAR; INTRAMUSCULAR at 16:40

## 2019-04-10 ASSESSMENT — ENCOUNTER SYMPTOMS
WHEEZING: 0
EYE PAIN: 0
COUGH: 0
EYE ITCHING: 0
VOICE CHANGE: 0
DIARRHEA: 0
RHINORRHEA: 0
VOMITING: 0
SINUS PRESSURE: 0
SORE THROAT: 0
BACK PAIN: 1
COLOR CHANGE: 0
CONSTIPATION: 0
SHORTNESS OF BREATH: 0
EYE DISCHARGE: 0
ABDOMINAL PAIN: 0
EYE REDNESS: 0
ABDOMINAL DISTENTION: 0
CHEST TIGHTNESS: 0
BLOOD IN STOOL: 0
NAUSEA: 0
PHOTOPHOBIA: 0
FACIAL SWELLING: 0

## 2019-04-10 NOTE — PROGRESS NOTES
Subjective:      Patient ID: Jennifer Canada is a 53 y.o. female. HPI   Here for follow up of hip pain and has been having some increased difficulty with getting into and out of a car and also has been having more trouble with going up and down steps. Laying on that side does make things worse. Review of Systems   Constitutional: Negative for activity change, appetite change, chills, diaphoresis, fatigue, fever and unexpected weight change. HENT: Negative for congestion, ear pain, facial swelling, hearing loss, postnasal drip, rhinorrhea, sinus pressure, sore throat and voice change. Eyes: Negative for photophobia, pain, discharge, redness, itching and visual disturbance. Respiratory: Negative for cough, chest tightness, shortness of breath and wheezing. Cardiovascular: Negative for chest pain and palpitations. Gastrointestinal: Negative for abdominal distention, abdominal pain, blood in stool, constipation, diarrhea, nausea and vomiting. Endocrine: Negative for cold intolerance and heat intolerance. Genitourinary: Negative for decreased urine volume, difficulty urinating, dysuria, flank pain, frequency, hematuria, pelvic pain, urgency, vaginal bleeding and vaginal discharge. Musculoskeletal: Positive for arthralgias (right hip) and back pain. Negative for gait problem, joint swelling, myalgias, neck pain and neck stiffness. Skin: Negative for color change, pallor and rash. Allergic/Immunologic: Negative for environmental allergies and food allergies. Neurological: Negative for dizziness, tremors, seizures, syncope, facial asymmetry, speech difficulty, weakness, numbness and headaches. Psychiatric/Behavioral: Negative for agitation, behavioral problems, dysphoric mood and sleep disturbance. The patient is not nervous/anxious and is not hyperactive. Objective:   Physical Exam   Constitutional: She is oriented to person, place, and time.  She appears well-developed and Achilles reflexes are 2+ on the right side and 2+ on the left side. Skin: Skin is warm and dry. No lesion and no rash noted. She is not diaphoretic. No cyanosis. Nails show no clubbing. Psychiatric: She has a normal mood and affect. Her speech is normal and behavior is normal. Judgment and thought content normal. Cognition and memory are normal.     Risks and benefits of injection were discussed which include but are not limited to pain, bleeding, infection, need for further procedures, inadequate pain relief, nerve injury, blood vessel injury, reaction to the steroid medication which may cause flushing or elevations in blood sugars/blood pressures, or adverse reaction to anesthetic medication. The patient expressed understanding of these risks and was able to repeat them back in their own words. A mixture of 2cc of 1% lidocaine and 3cc of kenalog was prepared for injection with a 21g 4 spinal needle. The right trochanteric bursa was prepared with betadine X2 with adequate dry time. Ethyl chloride was then applied under aseptic technique. The joint was entered from an approach lateral to the hip without resistance. The medication was then delivered and the needle removed without blood loss. Gentle pressure was then applied using a piece of sterile gauze and the area was given massage. A sterile bandage was then applied and instructions were given to keep it in place for at least 4 hours. Patient is to call with any increased pain, swelling, redness or warmth. Vitals:    04/10/19 1537   BP: 124/80   Pulse: 74   SpO2: 96%   Weight: (!) 419 lb (190.1 kg)       Assessment / Plan:         1. Pain of right hip joint  Injection as above. - triamcinolone acetonide (KENALOG-40) injection 120 mg  - lidocaine 1 % injection 2 mL  - MAJOR JOINT INJECTION;  Future

## 2019-04-10 NOTE — TELEPHONE ENCOUNTER
[facility] Trans[facility] Initial Follow Up Call    Outreach made within 2 business days of discharge: Yes, ed follow up     Patient: Barak Moralez Patient : 1970   MRN: [id number]  Reason for Admission: There are no discharge diagnoses documented for the most recent discharge. Discharge Date: 19       Spoke with: Jewell Hernandez    Discharge department/facility: [facility]    TCM Interactive Patient Contact:  Was patient able to fill all prescriptions: Yes  Was patient instructed to bring all medications to the follow-up visit: Yes  Is patient taking all medications as directed in the discharge summary?  Yes  Does patient understand their discharge instructions: Yes  Does patient have questions or concerns that need addressed prior to 7-14 day follow up office visit: no    Scheduled appointment with PCP within 7-14 days    Follow Up  Future Appointments   Date Time Provider Ruddy Taylor   4/10/2019  3:20 PM Dinora Liz MD  [facility]   2019 10:40 AM Dinora Liz MD [address]

## 2019-04-16 ENCOUNTER — TELEPHONE (OUTPATIENT)
Dept: FAMILY MEDICINE CLINIC | Age: 49
End: 2019-04-16

## 2019-04-16 ENCOUNTER — OFFICE VISIT (OUTPATIENT)
Dept: FAMILY MEDICINE CLINIC | Age: 49
End: 2019-04-16
Payer: MEDICAID

## 2019-04-16 VITALS
OXYGEN SATURATION: 98 % | WEIGHT: 293 LBS | SYSTOLIC BLOOD PRESSURE: 118 MMHG | BODY MASS INDEX: 71.27 KG/M2 | HEART RATE: 87 BPM | DIASTOLIC BLOOD PRESSURE: 80 MMHG

## 2019-04-16 DIAGNOSIS — M25.551 PAIN OF RIGHT HIP JOINT: ICD-10-CM

## 2019-04-16 DIAGNOSIS — K21.9 GASTROESOPHAGEAL REFLUX DISEASE WITHOUT ESOPHAGITIS: Primary | ICD-10-CM

## 2019-04-16 PROCEDURE — G8417 CALC BMI ABV UP PARAM F/U: HCPCS | Performed by: FAMILY MEDICINE

## 2019-04-16 PROCEDURE — 1036F TOBACCO NON-USER: CPT | Performed by: FAMILY MEDICINE

## 2019-04-16 PROCEDURE — G8427 DOCREV CUR MEDS BY ELIG CLIN: HCPCS | Performed by: FAMILY MEDICINE

## 2019-04-16 PROCEDURE — 99213 OFFICE O/P EST LOW 20 MIN: CPT | Performed by: FAMILY MEDICINE

## 2019-04-16 RX ORDER — OMEPRAZOLE 40 MG/1
40 CAPSULE, DELAYED RELEASE ORAL 2 TIMES DAILY
Qty: 60 CAPSULE | Refills: 5
Start: 2019-04-16 | End: 2019-08-02 | Stop reason: SDUPTHER

## 2019-04-16 RX ORDER — PREDNISONE 20 MG/1
TABLET ORAL
Qty: 34 TABLET | Refills: 1 | Status: SHIPPED | OUTPATIENT
Start: 2019-04-16 | End: 2019-07-31 | Stop reason: ALTCHOICE

## 2019-04-16 ASSESSMENT — ENCOUNTER SYMPTOMS
RHINORRHEA: 0
COUGH: 0
SINUS PRESSURE: 0
EYE PAIN: 0
BACK PAIN: 0
SHORTNESS OF BREATH: 0
VOICE CHANGE: 0
CONSTIPATION: 0
CHEST TIGHTNESS: 0
BLOOD IN STOOL: 0
EYE ITCHING: 0
EYE DISCHARGE: 0
NAUSEA: 1
PHOTOPHOBIA: 0
FACIAL SWELLING: 0
COLOR CHANGE: 0
EYE REDNESS: 0
DIARRHEA: 1
WHEEZING: 0
ABDOMINAL PAIN: 1
SORE THROAT: 0
VOMITING: 0
ABDOMINAL DISTENTION: 0

## 2019-04-16 NOTE — PROGRESS NOTES
Subjective:      Patient ID: Meagan Manning is a 53 y.o. female. HPI  Here for follow up of right hip pain and there has been significant improvement in the pain but there has been some increased gerd symptoms with the use of prednisone. She has been feeling some early satiety which is partially from the ozempic but has been worse with the prednisone. Review of Systems   Constitutional: Negative for activity change, appetite change, chills, diaphoresis, fatigue, fever and unexpected weight change. HENT: Negative for congestion, ear pain, facial swelling, hearing loss, postnasal drip, rhinorrhea, sinus pressure, sore throat and voice change. Eyes: Negative for photophobia, pain, discharge, redness, itching and visual disturbance. Respiratory: Negative for cough, chest tightness, shortness of breath and wheezing. Cardiovascular: Negative for chest pain and palpitations. Gastrointestinal: Positive for abdominal pain, diarrhea and nausea. Negative for abdominal distention, blood in stool, constipation and vomiting. Endocrine: Negative for cold intolerance and heat intolerance. Genitourinary: Negative for decreased urine volume, difficulty urinating, dysuria, flank pain, frequency, hematuria, pelvic pain, urgency, vaginal bleeding and vaginal discharge. Musculoskeletal: Positive for arthralgias (right hip). Negative for back pain, gait problem, joint swelling, myalgias, neck pain and neck stiffness. Skin: Negative for color change, pallor and rash. Allergic/Immunologic: Negative for environmental allergies and food allergies. Neurological: Negative for dizziness, tremors, seizures, syncope, facial asymmetry, speech difficulty, weakness, numbness and headaches. Psychiatric/Behavioral: Negative for agitation, behavioral problems, dysphoric mood and sleep disturbance. The patient is not nervous/anxious and is not hyperactive.         Objective:   Physical Exam   Constitutional: She is oriented to person, place, and time. She appears well-developed and well-nourished. She does not appear ill. No distress. HENT:   Head: Normocephalic and atraumatic. Right Ear: External ear normal.   Left Ear: External ear normal.   Nose: Nose normal. No mucosal edema or rhinorrhea. Mouth/Throat: Mucous membranes are normal. No oropharyngeal exudate, posterior oropharyngeal edema or posterior oropharyngeal erythema. Eyes: Pupils are equal, round, and reactive to light. EOM are normal. Right eye exhibits no discharge. Left eye exhibits no discharge. No scleral icterus. Neck: Trachea normal and normal range of motion. Neck supple. No JVD present. Carotid bruit is not present. No tracheal deviation present. No thyromegaly present. Cardiovascular: Normal rate, regular rhythm, S1 normal, S2 normal, normal heart sounds and normal pulses. Exam reveals no gallop, no S3, no S4, no distant heart sounds and no friction rub. No murmur heard. Pulmonary/Chest: No respiratory distress. She has no decreased breath sounds. She has no wheezes. She has no rhonchi. She has no rales. Abdominal: Soft. Normal appearance and bowel sounds are normal. There is no hepatosplenomegaly. There is no tenderness. There is no CVA tenderness. No hernia. Lymphadenopathy:     She has no cervical adenopathy. Right cervical: No superficial cervical adenopathy present. Left cervical: No superficial cervical adenopathy present. Neurological: She is alert and oriented to person, place, and time. She has normal strength. No cranial nerve deficit or sensory deficit. Coordination and gait normal.   Reflex Scores:       Brachioradialis reflexes are 2+ on the right side and 2+ on the left side. Patellar reflexes are 2+ on the right side and 2+ on the left side. Achilles reflexes are 2+ on the right side and 2+ on the left side. Skin: Skin is warm and dry. No lesion and no rash noted. She is not diaphoretic. No cyanosis.

## 2019-04-23 RX ORDER — OXYBUTYNIN CHLORIDE 15 MG/1
15 TABLET, EXTENDED RELEASE ORAL 2 TIMES DAILY
Qty: 180 TABLET | Refills: 3 | Status: SHIPPED | OUTPATIENT
Start: 2019-04-23 | End: 2019-08-02 | Stop reason: SDUPTHER

## 2019-04-23 NOTE — TELEPHONE ENCOUNTER
Pt states she is suppose to take the oxybutynin TID, and she is asking for samples of saxcenda, and if can't do samples she will take a script, and is asking for something for the nausea, she said the [address] works great and she is asking for something to help control her appetite, please call pt back

## 2019-04-24 ENCOUNTER — NURSE ONLY (OUTPATIENT)
Dept: FAMILY MEDICINE CLINIC | Age: 49
End: 2019-04-24

## 2019-04-24 VITALS — WEIGHT: 293 LBS | BODY MASS INDEX: 70.03 KG/M2

## 2019-04-24 DIAGNOSIS — E66.01 CLASS 3 SEVERE OBESITY WITHOUT SERIOUS COMORBIDITY IN ADULT, UNSPECIFIED BMI, UNSPECIFIED OBESITY TYPE (HCC): Primary | ICD-10-CM

## 2019-04-24 NOTE — PROGRESS NOTES
She was here for a weight check per Dr. Linda Whittington she will be back next week for her weekly check up

## 2019-04-29 DIAGNOSIS — E66.01 CLASS 3 SEVERE OBESITY WITHOUT SERIOUS COMORBIDITY IN ADULT, UNSPECIFIED BMI, UNSPECIFIED OBESITY TYPE (HCC): ICD-10-CM

## 2019-05-02 ENCOUNTER — TELEPHONE (OUTPATIENT)
Dept: FAMILY MEDICINE CLINIC | Age: 49
End: 2019-05-02

## 2019-05-02 ENCOUNTER — NURSE ONLY (OUTPATIENT)
Dept: FAMILY MEDICINE CLINIC | Age: 49
End: 2019-05-02
Payer: MEDICAID

## 2019-05-02 DIAGNOSIS — M25.551 PAIN OF RIGHT HIP JOINT: Primary | ICD-10-CM

## 2019-05-02 PROCEDURE — 96372 THER/PROPH/DIAG INJ SC/IM: CPT | Performed by: FAMILY MEDICINE

## 2019-05-02 RX ORDER — KETOROLAC TROMETHAMINE 30 MG/ML
60 INJECTION, SOLUTION INTRAMUSCULAR; INTRAVENOUS ONCE
Status: COMPLETED | OUTPATIENT
Start: 2019-05-02 | End: 2019-05-02

## 2019-05-02 RX ORDER — PROMETHAZINE HYDROCHLORIDE 25 MG/ML
12.5 INJECTION, SOLUTION INTRAMUSCULAR; INTRAVENOUS ONCE
Status: COMPLETED | OUTPATIENT
Start: 2019-05-02 | End: 2019-05-02

## 2019-05-02 RX ADMIN — PROMETHAZINE HYDROCHLORIDE 12.5 MG: 25 INJECTION, SOLUTION INTRAMUSCULAR; INTRAVENOUS at 13:44

## 2019-05-02 RX ADMIN — KETOROLAC TROMETHAMINE 60 MG: 30 INJECTION, SOLUTION INTRAMUSCULAR; INTRAVENOUS at 13:45

## 2019-05-02 NOTE — TELEPHONE ENCOUNTER
She is having a lot of pain in her right hip and back, she was wondering if she can come in to get a pain shot.      Please advised

## 2019-05-03 NOTE — TELEPHONE ENCOUNTER
Yes but that's an acute issue and there are no acute appointments for over a week so I would suggest moving someone that is scheduled for a well check to [facility]'s schedule.

## 2019-07-31 ENCOUNTER — HOSPITAL ENCOUNTER (EMERGENCY)
Age: 49
Discharge: HOME | End: 2019-07-31
Attending: EMERGENCY MEDICINE

## 2019-07-31 VITALS
HEART RATE: 83 BPM | SYSTOLIC BLOOD PRESSURE: 141 MMHG | TEMPERATURE: 98.1 F | DIASTOLIC BLOOD PRESSURE: 71 MMHG | HEIGHT: 64 IN | WEIGHT: 293 LBS | RESPIRATION RATE: 18 BRPM | OXYGEN SATURATION: 100 % | BODY MASS INDEX: 50.02 KG/M2

## 2019-07-31 DIAGNOSIS — G89.29 CHRONIC RIGHT HIP PAIN: Primary | ICD-10-CM

## 2019-07-31 DIAGNOSIS — M25.551 CHRONIC RIGHT HIP PAIN: Primary | ICD-10-CM

## 2019-07-31 PROCEDURE — 99282 EMERGENCY DEPT VISIT SF MDM: CPT

## 2019-07-31 PROCEDURE — 6360000002 HC RX W HCPCS: Performed by: NURSE PRACTITIONER

## 2019-07-31 PROCEDURE — 96372 THER/PROPH/DIAG INJ SC/IM: CPT

## 2019-07-31 RX ORDER — PREDNISONE 10 MG/1
10 TABLET ORAL DAILY
Qty: 27 TABLET | Refills: 0 | Status: SHIPPED | OUTPATIENT
Start: 2019-07-31 | End: 2019-08-02 | Stop reason: ALTCHOICE

## 2019-07-31 RX ORDER — KETOROLAC TROMETHAMINE 30 MG/ML
60 INJECTION, SOLUTION INTRAMUSCULAR; INTRAVENOUS ONCE
Status: COMPLETED | OUTPATIENT
Start: 2019-07-31 | End: 2019-07-31

## 2019-07-31 RX ADMIN — KETOROLAC TROMETHAMINE 60 MG: 30 INJECTION, SOLUTION INTRAMUSCULAR at 16:39

## 2019-07-31 ASSESSMENT — PAIN DESCRIPTION - ORIENTATION: ORIENTATION: RIGHT

## 2019-07-31 ASSESSMENT — PAIN SCALES - GENERAL: PAINLEVEL_OUTOF10: 8

## 2019-07-31 ASSESSMENT — PAIN DESCRIPTION - FREQUENCY: FREQUENCY: INTERMITTENT

## 2019-07-31 ASSESSMENT — PAIN DESCRIPTION - PAIN TYPE: TYPE: CHRONIC PAIN

## 2019-07-31 ASSESSMENT — PAIN DESCRIPTION - DESCRIPTORS: DESCRIPTORS: ACHING;DISCOMFORT

## 2019-07-31 ASSESSMENT — PAIN DESCRIPTION - LOCATION: LOCATION: HIP;LEG

## 2019-07-31 ASSESSMENT — PAIN DESCRIPTION - PROGRESSION: CLINICAL_PROGRESSION: GRADUALLY WORSENING

## 2019-07-31 ASSESSMENT — PAIN - FUNCTIONAL ASSESSMENT: PAIN_FUNCTIONAL_ASSESSMENT: PREVENTS OR INTERFERES SOME ACTIVE ACTIVITIES AND ADLS

## 2019-07-31 NOTE — ED PROVIDER NOTES
eMERGENCY dEPARTMENT eNCOUnter   Independent Attestation     Pt Name: Herber Chavez  MRN: [id number]  [place] 1970  Date of evaluation: 7/31/19     Herber Chavez is a 53 y.o. female with CC: Hip Pain (right) and Leg Pain (right)      Based on the medical record the care appears appropriate. I was personally available for consultation in the Emergency Department.     Dave Calles MD  Attending Emergency Physician                  Shannan Guadarrama MD  07/31/19 2045
[facility] ULTRASOUND:   Performed by Fiona Lin - tylor    LABS:  [unfilled]    All other labs were within normal range or not returned as of this dictation. EMERGENCY DEPARTMENT COURSE andDIFFERENTIAL DIAGNOSIS/MDM:   Patient presents with chronic right hip pain. She denies new injury. Imaging is not indicated. No neurological deficits. She is requesting a steroid taper dose for her pain. She is given Toradol in the ED will be discharged on a steroid taper. She is instructed to follow-up with her doctor soon as possible. Vitals:    Vitals:    07/31/19 1621   BP: (!) 141/71   Pulse: 83   Resp: 18   Temp: 98.1 °F (36.7 °C)   SpO2: 100%   Weight: (!) 404 lb 12.8 oz (183.6 kg)   Height: 5' 4\" (1.626 m)         CONSULTS:  None    PROCEDURES:  Procedures    FINAL IMPRESSION      1.  Chronic right hip pain          DISPOSITION/PLAN   DISPOSITION Decision To Discharge 07/31/2019 04:36:13 PM      PATIENT REFERRED TO:   Rojelio Jose MD  [address]  [phone]    Schedule an appointment as soon as possible for a visit       [address] [facility] ED  [address] [place] [address]  [phone]    As needed      DISCHARGE MEDICATIONS:     New Prescriptions    PREDNISONE (DELTASONE) 10 MG TABLET    Take 1 tablet by mouth daily for 10 days 4 tabs PO QD for 3 days,  Then 3 tabs PO QD for 3 days,  Then 2 tabs PO QD for 2 days,  Then 1 tab PO QD for 2 days     Electronically signed by CASANDRA Malone 7/31/2019 at 4:41 PM           CASANDRA Malone CNP  07/31/19 3551

## 2019-08-01 ENCOUNTER — TELEPHONE (OUTPATIENT)
Dept: FAMILY MEDICINE CLINIC | Age: 49
End: 2019-08-01

## 2019-08-01 NOTE — TELEPHONE ENCOUNTER
[facility] [place] ([facility]) ED Follow up Call    Reason for ED visit:  Hip Pain     8/1/2019     Hi Jorge Alcocer , this is Jorge Alcocer from Dr. Chacorta Rios's office, just calling to see how you are doing after your recent ED visit. Did you receive discharge instructions? Yes  Do you understand the discharge instructions? Yes  Did the ED give you any new prescriptions? Yes  Were you able to fill your prescriptions? Yes      Do you have one of our red, yellow and green  Zone sheets that help you to determine when you should go to the ED? Not Applicable      Do you need or want to make a follow up appt with your PCP? Not Applicable    Do you have any further needs in the home, e.g. equipment?   Not Applicable        FU appts/Provider:    Future Appointments   Date Time Provider Ruddy Taylor   8/2/2019  9:30 AM Collette Long, [address] [place] [address]

## 2019-08-02 ENCOUNTER — OFFICE VISIT (OUTPATIENT)
Dept: FAMILY MEDICINE CLINIC | Age: 49
End: 2019-08-02
Payer: COMMERCIAL

## 2019-08-02 VITALS
BODY MASS INDEX: 71.1 KG/M2 | WEIGHT: 293 LBS | DIASTOLIC BLOOD PRESSURE: 80 MMHG | OXYGEN SATURATION: 96 % | HEART RATE: 74 BPM | SYSTOLIC BLOOD PRESSURE: 120 MMHG

## 2019-08-02 DIAGNOSIS — I10 ESSENTIAL HYPERTENSION: ICD-10-CM

## 2019-08-02 DIAGNOSIS — M16.51 POST-TRAUMATIC OSTEOARTHRITIS OF RIGHT HIP: ICD-10-CM

## 2019-08-02 DIAGNOSIS — F50.81 BINGE EATING DISORDER: ICD-10-CM

## 2019-08-02 DIAGNOSIS — E66.01 CLASS 3 SEVERE OBESITY DUE TO EXCESS CALORIES WITH SERIOUS COMORBIDITY AND BODY MASS INDEX (BMI) GREATER THAN OR EQUAL TO 70 IN ADULT (HCC): ICD-10-CM

## 2019-08-02 DIAGNOSIS — M16.10 HIP ARTHRITIS: Primary | ICD-10-CM

## 2019-08-02 PROCEDURE — G8427 DOCREV CUR MEDS BY ELIG CLIN: HCPCS | Performed by: FAMILY MEDICINE

## 2019-08-02 PROCEDURE — G8417 CALC BMI ABV UP PARAM F/U: HCPCS | Performed by: FAMILY MEDICINE

## 2019-08-02 PROCEDURE — 1036F TOBACCO NON-USER: CPT | Performed by: FAMILY MEDICINE

## 2019-08-02 PROCEDURE — 99213 OFFICE O/P EST LOW 20 MIN: CPT | Performed by: FAMILY MEDICINE

## 2019-08-02 RX ORDER — OMEPRAZOLE 40 MG/1
40 CAPSULE, DELAYED RELEASE ORAL 2 TIMES DAILY
Qty: 180 CAPSULE | Refills: 3 | Status: SHIPPED | OUTPATIENT
Start: 2019-08-02 | End: 2020-08-13

## 2019-08-02 RX ORDER — PHENTERMINE HYDROCHLORIDE 37.5 MG/1
37.5 TABLET ORAL
Qty: 30 TABLET | Refills: 0 | Status: SHIPPED | OUTPATIENT
Start: 2019-08-02 | End: 2019-09-01

## 2019-08-02 RX ORDER — OXYCODONE AND ACETAMINOPHEN 10; 325 MG/1; MG/1
1 TABLET ORAL EVERY 4 HOURS PRN
Qty: 42 TABLET | Refills: 0 | Status: SHIPPED | OUTPATIENT
Start: 2019-08-02 | End: 2019-09-06 | Stop reason: SDUPTHER

## 2019-08-02 RX ORDER — OXYCODONE AND ACETAMINOPHEN 7.5; 325 MG/1; MG/1
1 TABLET ORAL EVERY 4 HOURS PRN
Qty: 40 TABLET | Refills: 0 | Status: CANCELLED | OUTPATIENT
Start: 2019-08-02 | End: 2019-08-09

## 2019-08-02 RX ORDER — AMLODIPINE BESYLATE 5 MG/1
5 TABLET ORAL DAILY
Qty: 90 TABLET | Refills: 3 | Status: SHIPPED | OUTPATIENT
Start: 2019-08-02

## 2019-08-02 RX ORDER — LIDOCAINE HYDROCHLORIDE 10 MG/ML
2 INJECTION, SOLUTION INFILTRATION; PERINEURAL ONCE
Status: COMPLETED | OUTPATIENT
Start: 2019-08-02 | End: 2019-08-02

## 2019-08-02 RX ORDER — OXYBUTYNIN CHLORIDE 15 MG/1
15 TABLET, EXTENDED RELEASE ORAL 2 TIMES DAILY
Qty: 180 TABLET | Refills: 3 | Status: SHIPPED | OUTPATIENT
Start: 2019-08-02 | End: 2020-08-13

## 2019-08-02 RX ORDER — TRIAMCINOLONE ACETONIDE 40 MG/ML
120 INJECTION, SUSPENSION INTRA-ARTICULAR; INTRAMUSCULAR ONCE
Status: COMPLETED | OUTPATIENT
Start: 2019-08-02 | End: 2019-08-02

## 2019-08-02 RX ORDER — PREDNISONE 20 MG/1
TABLET ORAL
Qty: 34 TABLET | Refills: 0 | Status: SHIPPED | OUTPATIENT
Start: 2019-08-02 | End: 2020-08-13

## 2019-08-02 RX ADMIN — LIDOCAINE HYDROCHLORIDE 2 ML: 10 INJECTION, SOLUTION INFILTRATION; PERINEURAL at 14:03

## 2019-08-02 RX ADMIN — TRIAMCINOLONE ACETONIDE 120 MG: 40 INJECTION, SUSPENSION INTRA-ARTICULAR; INTRAMUSCULAR at 14:03

## 2019-08-02 ASSESSMENT — ENCOUNTER SYMPTOMS
ABDOMINAL DISTENTION: 0
FACIAL SWELLING: 0
EYE PAIN: 0
DIARRHEA: 0
VOMITING: 0
VOICE CHANGE: 0
COLOR CHANGE: 0
EYE DISCHARGE: 0
NAUSEA: 0
BACK PAIN: 0
SORE THROAT: 0
SHORTNESS OF BREATH: 0
EYE REDNESS: 0
CONSTIPATION: 0
SINUS PRESSURE: 0
EYE ITCHING: 0
BLOOD IN STOOL: 0
WHEEZING: 0
CHEST TIGHTNESS: 0
COUGH: 0
ABDOMINAL PAIN: 0
RHINORRHEA: 0
PHOTOPHOBIA: 0

## 2019-08-02 NOTE — PROGRESS NOTES
cervical: No superficial cervical adenopathy present. Left cervical: No superficial cervical adenopathy present. Neurological: She is alert and oriented to person, place, and time. She has normal strength. No cranial nerve deficit or sensory deficit. Coordination and gait normal.   Reflex Scores:       Brachioradialis reflexes are 2+ on the right side and 2+ on the left side. Patellar reflexes are 2+ on the right side and 2+ on the left side. Achilles reflexes are 2+ on the right side and 2+ on the left side. Skin: Skin is warm and dry. No lesion and no rash noted. She is not diaphoretic. No cyanosis. Nails show no clubbing. Psychiatric: She has a normal mood and affect. Her speech is normal and behavior is normal. Judgment and thought content normal. Cognition and memory are normal.   Risks and benefits of injection were discussed which include but are not limited to pain, bleeding, infection, need for further procedures, inadequate pain relief, nerve injury, blood vessel injury, reaction to the steroid medication which may cause flushing or elevations in blood sugars/blood pressures, or adverse reaction to anesthetic medication. The patient expressed understanding of these risks and was able to repeat them back in their own words. A mixture of 2cc of 1% lidocaine and 3cc of kenalog was prepared for injection with a 21g 4 spinal needle. The right trochanteric bursa was prepared with betadine X2 with adequate dry time. Ethyl chloride was then applied under aseptic technique. The joint was entered from an approach lateral to the hip without resistance. The medication was then delivered and the needle removed without blood loss. Gentle pressure was then applied using a piece of sterile gauze and the area was given massage. A sterile bandage was then applied and instructions were given to keep it in place for at least 4 hours.     Patient is to call with any increased pain, swelling, redness or warmth. Assessment & Plan:     1. Post-traumatic osteoarthritis of right hip  OARRS report reviewed, no concerns. Increased dose on medication for acute pain management. If weight can be improved, we can move toward hip replacement. 2. Essential hypertension  - Discussed the role of hypertension in development of other disease courses such as CHF and atherosclerosis. - Encouraged patient to avoid sodium in the diet and reminded that the majority of sodium comes from packaged foods in cans and boxes which should be avoided where possible. - Encouraged good hydration and avoidance of caffeine where possible. - Discussed goals for blood pressure monitoring which should be 130/80.     - amLODIPine (NORVASC) 5 MG tablet; Take 1 tablet by mouth daily  Dispense: 90 tablet; Refill: 3    3. Hip arthritis  - oxyCODONE-acetaminophen (PERCOCET)  MG per tablet; Take 1 tablet by mouth every 4 hours as needed for Pain for up to 7 days. Intended supply: 30 days  Dispense: 42 tablet; Refill: 0    4. Class 3 severe obesity due to excess calories with serious comorbidity and body mass index (BMI) greater than or equal to 70 in [facility])  Will start a comprehensive diet and exercise program with a goal of reducing her BMI to 40 or below to make for candidacy for hip replacement. - phentermine (ADIPEX-P) 37.5 MG tablet; Take 1 tablet by mouth every morning (before breakfast) for 30 days. Dispense: 30 tablet;  Refill: 0

## 2019-09-06 DIAGNOSIS — M16.10 HIP ARTHRITIS: ICD-10-CM

## 2019-09-06 RX ORDER — OXYCODONE AND ACETAMINOPHEN 10; 325 MG/1; MG/1
1 TABLET ORAL EVERY 4 HOURS PRN
Qty: 42 TABLET | Refills: 0 | Status: SHIPPED | OUTPATIENT
Start: 2019-09-06 | End: 2019-09-13

## 2020-01-02 ENCOUNTER — TELEPHONE (OUTPATIENT)
Dept: FAMILY MEDICINE CLINIC | Age: 50
End: 2020-01-02

## 2020-01-02 NOTE — TELEPHONE ENCOUNTER
Pt wants a note stating that she has to put her feet up at work to keep the swelling in legs and feet down.       Advised Dr Ruth Claros no longer here, and given the phone number to call [facility] for new provider

## 2020-06-25 ENCOUNTER — HOSPITAL ENCOUNTER (OUTPATIENT)
Age: 50
Discharge: HOME | End: 2020-06-25

## 2020-06-25 LAB
ABSOLUTE EOS #: 0.03 K/UL (ref 0–0.44)
ABSOLUTE IMMATURE GRANULOCYTE: <0.03 K/UL (ref 0–0.3)
ABSOLUTE LYMPH #: 1.94 K/UL (ref 1.1–3.7)
ABSOLUTE MONO #: 0.52 K/UL (ref 0.1–1.2)
ALBUMIN SERPL-MCNC: 4.2 G/DL (ref 3.5–5.2)
ALBUMIN/GLOBULIN RATIO: 1.3 (ref 1–2.5)
ALP BLD-CCNC: 47 U/L (ref 35–104)
ALT SERPL-CCNC: 11 U/L (ref 5–33)
ANION GAP SERPL CALCULATED.3IONS-SCNC: 17 MMOL/L (ref 9–17)
AST SERPL-CCNC: 13 U/L
BASOPHILS # BLD: 0 % (ref 0–2)
BASOPHILS ABSOLUTE: <0.03 K/UL (ref 0–0.2)
BILIRUB SERPL-MCNC: 0.45 MG/DL (ref 0.3–1.2)
BNP INTERPRETATION: NORMAL
BUN BLDV-MCNC: 25 MG/DL (ref 6–20)
BUN/CREAT BLD: ABNORMAL (ref 9–20)
CALCIUM SERPL-MCNC: 8.9 MG/DL (ref 8.6–10.4)
CHLORIDE BLD-SCNC: 98 MMOL/L (ref 98–107)
CO2: 22 MMOL/L (ref 20–31)
CREAT SERPL-MCNC: 1.23 MG/DL (ref 0.5–0.9)
DIFFERENTIAL TYPE: ABNORMAL
EOSINOPHILS RELATIVE PERCENT: 1 % (ref 1–4)
GFR AFRICAN AMERICAN: 56 ML/MIN
GFR NON-AFRICAN AMERICAN: 47 ML/MIN
GFR SERPL CREATININE-BSD FRML MDRD: ABNORMAL ML/MIN/{1.73_M2}
GFR SERPL CREATININE-BSD FRML MDRD: ABNORMAL ML/MIN/{1.73_M2}
GLUCOSE BLD-MCNC: 78 MG/DL (ref 70–99)
HCT VFR BLD CALC: 44 % (ref 36.3–47.1)
HEMOGLOBIN: 13.2 G/DL (ref 11.9–15.1)
IMMATURE GRANULOCYTES: 0 %
LYMPHOCYTES # BLD: 42 % (ref 24–43)
MCH RBC QN AUTO: 27.5 PG (ref 25.2–33.5)
MCHC RBC AUTO-ENTMCNC: 30 G/DL (ref 28.4–34.8)
MCV RBC AUTO: 91.7 FL (ref 82.6–102.9)
MONOCYTES # BLD: 11 % (ref 3–12)
NRBC AUTOMATED: 0 PER 100 WBC
PDW BLD-RTO: 16.3 % (ref 11.8–14.4)
PLATELET # BLD: 190 K/UL (ref 138–453)
PLATELET ESTIMATE: ABNORMAL
PMV BLD AUTO: 11.1 FL (ref 8.1–13.5)
POTASSIUM SERPL-SCNC: 3.9 MMOL/L (ref 3.7–5.3)
PREALBUMIN: 24.2 MG/DL (ref 20–40)
PRO-BNP: 91 PG/ML
RBC # BLD: 4.8 M/UL (ref 3.95–5.11)
RBC # BLD: ABNORMAL 10*6/UL
SEG NEUTROPHILS: 46 % (ref 36–65)
SEGMENTED NEUTROPHILS ABSOLUTE COUNT: 2.15 K/UL (ref 1.5–8.1)
SODIUM BLD-SCNC: 137 MMOL/L (ref 135–144)
TOTAL PROTEIN: 7.5 G/DL (ref 6.4–8.3)
WBC # BLD: 4.7 K/UL (ref 3.5–11.3)
WBC # BLD: ABNORMAL 10*3/UL

## 2020-06-25 PROCEDURE — 80053 COMPREHEN METABOLIC PANEL: CPT

## 2020-06-25 PROCEDURE — 84134 ASSAY OF PREALBUMIN: CPT

## 2020-06-25 PROCEDURE — 83880 ASSAY OF NATRIURETIC PEPTIDE: CPT

## 2020-06-25 PROCEDURE — 36415 COLL VENOUS BLD VENIPUNCTURE: CPT

## 2020-06-25 PROCEDURE — 85025 COMPLETE CBC W/AUTO DIFF WBC: CPT

## 2020-08-11 ENCOUNTER — HOSPITAL ENCOUNTER (OUTPATIENT)
Dept: WOUND CARE | Age: 50
Discharge: HOME | DRG: 570 | End: 2020-08-11
Payer: COMMERCIAL

## 2020-08-11 VITALS
BODY MASS INDEX: 50.02 KG/M2 | SYSTOLIC BLOOD PRESSURE: 172 MMHG | TEMPERATURE: 97.8 F | WEIGHT: 293 LBS | DIASTOLIC BLOOD PRESSURE: 110 MMHG | HEIGHT: 64 IN | HEART RATE: 103 BPM

## 2020-08-11 PROBLEM — I89.0 LYMPHEDEMA: Status: ACTIVE | Noted: 2020-08-11

## 2020-08-11 PROBLEM — L97.112 LOWER LIMB ULCER, THIGH, RIGHT, WITH FAT LAYER EXPOSED (HCC): Status: ACTIVE | Noted: 2020-08-11

## 2020-08-11 PROBLEM — L97.122: Status: ACTIVE | Noted: 2020-08-11

## 2020-08-11 PROCEDURE — 99213 OFFICE O/P EST LOW 20 MIN: CPT

## 2020-08-11 PROCEDURE — 11042 DBRDMT SUBQ TIS 1ST 20SQCM/<: CPT

## 2020-08-11 PROCEDURE — 0JBN0ZZ EXCISION OF RIGHT LOWER LEG SUBCUTANEOUS TISSUE AND FASCIA, OPEN APPROACH: ICD-10-PCS | Performed by: PODIATRIST

## 2020-08-11 PROCEDURE — 6370000000 HC RX 637 (ALT 250 FOR IP): Performed by: PODIATRIST

## 2020-08-11 RX ORDER — LIDOCAINE HYDROCHLORIDE 20 MG/ML
JELLY TOPICAL PRN
Status: DISCONTINUED | OUTPATIENT
Start: 2020-08-11 | End: 2020-08-12 | Stop reason: HOSPADM

## 2020-08-11 RX ORDER — OXYCODONE AND ACETAMINOPHEN 10; 325 MG/1; MG/1
1 TABLET ORAL
COMMUNITY

## 2020-08-11 RX ORDER — FUROSEMIDE 40 MG/1
40 TABLET ORAL 2 TIMES DAILY
COMMUNITY
End: 2020-08-13

## 2020-08-11 RX ORDER — BUMETANIDE 1 MG/1
1 TABLET ORAL DAILY
Status: ON HOLD | COMMUNITY
End: 2020-08-21 | Stop reason: HOSPADM

## 2020-08-11 RX ORDER — DOXYCYCLINE HYCLATE 50 MG/1
50 TABLET, FILM COATED ORAL DAILY
Status: ON HOLD | COMMUNITY
End: 2020-08-21 | Stop reason: HOSPADM

## 2020-08-11 RX ORDER — DEXTROAMPHETAMINE SACCHARATE, AMPHETAMINE ASPARTATE, DEXTROAMPHETAMINE SULFATE AND AMPHETAMINE SULFATE 5; 5; 5; 5 MG/1; MG/1; MG/1; MG/1
20 TABLET ORAL 2 TIMES DAILY PRN
COMMUNITY

## 2020-08-11 RX ADMIN — LIDOCAINE HYDROCHLORIDE: 20 JELLY TOPICAL at 15:11

## 2020-08-11 NOTE — PROGRESS NOTES
[address]   Progress Note and Procedure Note      Estela Sargent  MEDICAL RECORD NUMBER:  [id number]  AGE: 51 y.o. GENDER: female  : 1970  EPISODE DATE:  2020    Subjective:     Chief Complaint   Patient presents with    Wound Check         HISTORY of PRESENT ILLNESS HPI     Estela Sargent is a 51 y.o. female who presents today for wound/ulcer evaluation. History of Wound Context: REFERRED BY DR MCNEIL - PT HAS SIG LYMPHEDEMA  AND DR MCNEIL RECOMMENDED LYMPHATIC DEBULKING FROM [facility] OR [facility]. PT HAS CHRONIC WOUND ON HER LEGS THAT \"RUN LIKE CRAZY\"  Associated Signs/Symptoms: edema    Ulcer Identification:  Ulcer Type: lymphedema    Contributing Factors: lymphedema    PAST MEDICAL HISTORY        Diagnosis Date    Anxiety     Asthma     Headache(784.0)     Hypertension        PAST SURGICAL HISTORY    Past Surgical History:   Procedure Laterality Date    GASTRIC BAND         FAMILY HISTORY    Family History   Problem Relation Age of Onset    Thyroid Disease Mother     Asthma Mother     Heart Failure Mother     COPD Mother     Heart Attack Father     Diabetes Father     Stroke Father        SOCIAL HISTORY    Social History     Tobacco Use    Smoking status: Never Smoker    Smokeless tobacco: Never Used   Substance Use Topics    Alcohol use: Yes     Comment: social    Drug use: No       ALLERGIES    Allergies   Allergen Reactions    Clindamycin/Lincomycin     Estrogens     Fish-Derived Products     Ibuprofen     Iodine     Medrol [Methylprednisolone]      itching    Metformin And Related     Morphine Itching    Nsaids      Pt has had a gastric banding       MEDICATIONS    Current Outpatient Medications on File Prior to Encounter   Medication Sig Dispense Refill    oxyCODONE-acetaminophen (PERCOCET)  MG per tablet Take 1 tablet by mouth every 4 hours as needed for Pain.       amphetamine-dextroamphetamine (ADDERALL) 20 MG tablet Take 20 mg by mouth 2 times daily.  furosemide (LASIX) 40 MG tablet Take 40 mg by mouth 2 times daily      bumetanide (BUMEX) 1 MG tablet Take 1 mg by mouth daily      DOXYCYCLINE HYCLATE PO Take 50 mg by mouth daily      amLODIPine (NORVASC) 5 MG tablet Take 1 tablet by mouth daily 90 tablet 3    predniSONE (DELTASONE) 20 MG tablet 3 PO [id number], then 2 PO [id number], 1 PO [id number], 1/2 PO [id number], 1/2 PO [id number] 34 tablet 0    clocortolone pivalate (CLODERM PUMP) 0.1 % CREA Apply 3 g topically daily 75 g 1    naproxen (NAPROSYN) 500 MG tablet Take 1 tablet by mouth 2 times daily (with meals) 20 tablet 0    albuterol (PROVENTIL HFA) 108 (90 BASE) MCG/ACT inhaler Inhale 1-2 puffs into the lungs every 4 hours as needed for Wheezing 1 Inhaler 0    oxybutynin (DITROPAN XL) 15 MG extended release tablet Take 1 tablet by mouth 2 times daily 180 tablet 3    omeprazole (PRILOSEC) 40 MG delayed release capsule Take 1 capsule by mouth 2 times daily 180 capsule 3    lisdexamfetamine (VYVANSE) 70 MG capsule Take 1 capsule by mouth every morning for 30 days. 30 capsule 0    ondansetron (ZOFRAN ODT) 4 MG disintegrating tablet Take 1 tablet by mouth every 8 hours as needed for Nausea or Vomiting 20 tablet 0    permethrin (ELIMITE) 5 % cream Apply topically as directed 60 g 1     No current facility-administered medications on file prior to encounter. REVIEW OF SYSTEMS    Pertinent items are noted in HPI.     Objective:      BP (!) 172/110   Pulse 103   Temp 97.8 °F (36.6 °C) (Oral)   Ht 5' 4\" (1.626 m)   Wt (!) 414 lb (187.8 kg)   BMI 71.06 kg/m²     Wt Readings from Last 3 Encounters:   08/11/20 (!) 414 lb (187.8 kg)   08/02/19 (!) 414 lb 3.2 oz (187.9 kg)   07/31/19 (!) 404 lb 12.8 oz (183.6 kg)       PHYSICAL EXAM    Wound:         Wound Description: OPEN LESIONS MEDIAL THIGH BILATERAL    Integument:    Open lesions present, Bilateral.  Interdigital macerations absent, Bilateral.      Vascular:  DP/PT pulses DOPPLER AUDIBLE   Hair growth absent to level of digits, Bilateral.  Edema present, Bilateral.  Erythema absent, Bilateral.     Neurological:  Sensation present to light touch to level of digits, Bilateral.  Protective sensation  present via 5.07/10g Geraldine-Anmol monofilament 10/10 sites, Bilateral.      Musculoskeletal:  Muscle strength 5/5 all LE groups tested, Bilateral.       Assessment:        Active Hospital Problems    Diagnosis Date Noted    Lymphedema [I89.0] 08/11/2020    Lower limb ulcer, thigh, right, with fat layer exposed [facility]) [L97.112] 08/11/2020    Chronic ulcer of thigh, left, with fat layer exposed [facility]) [L97.122] 08/11/2020        Procedure Note  Indications:  Based on my examination of this patient's wound(s)/ulcer(s) today, debridement is required to promote healing and evaluate the wound base. Performed by: Paul Wolf DPM    Consent obtained:  Yes    Time out taken:  Yes    Pain Control: Anesthetic  Anesthetic: 2% Lidocaine Gel Topical       Debridement: Excisional Debridement    Using curette the wound(s)/ulcer(s) was/were sharply debrided down through and including the removal of subcutaneous tissue. Devitalized Tissue Debrided:  fibrin    Culture Obtained:  No    Pre Debridement Measurements:  Are located in the [place]  Documentation Flow Sheet    Post Debridement Measurements:  Wound/Ulcer Descriptions are Pre Debridement except measurements:    Wound 08/11/20 Leg Right; Lower;Medial #1 (Active)   Wound Image   08/11/20 1504   Dressing Status New drainage; Old drainage 08/11/20 1504   Dressing Changed Changed/New 08/11/20 1504   Wound Cleansed Rinsed/Irrigated with saline 08/11/20 1504   Wound Length (cm) 0.1 cm 08/11/20 1504   Wound Width (cm) 0.1 cm 08/11/20 1504   Wound Depth (cm) 0.1 cm 08/11/20 1504   Wound Surface Area (cm^2) 0.01 cm^2 08/11/20 1504   Wound Volume (cm^3) 0 cm^3 08/11/20 1504   Post-Procedure Length (cm) 0.1 cm 08/11/20 1504 Post-Procedure Width (cm) 0.1 cm 08/11/20 1504   Post-Procedure Depth (cm) 0.1 cm 08/11/20 1504   Post-Procedure Surface Area (cm^2) 0.01 cm^2 08/11/20 1504   Post-Procedure Volume (cm^3) 0 cm^3 08/11/20 1504   Wound Assessment Drainage; White 08/11/20 1504   Drainage Amount Copious 08/11/20 1504   Drainage Description Serous 08/11/20 1504   Odor None 08/11/20 1504   Margins Defined edges 08/11/20 1504   Other%Wound Bed White 100 08/11/20 1504   Number of days: 0       Wound 08/11/20 Leg Left; Lower;Medial (Active)   Wound Image   08/11/20 1504   Dressing Status New drainage; Old drainage 08/11/20 1504   Dressing Changed Changed/New 08/11/20 1504   Wound Cleansed Rinsed/Irrigated with saline 08/11/20 1504   Wound Length (cm) 0.4 cm 08/11/20 1504   Wound Width (cm) 0.4 cm 08/11/20 1504   Wound Depth (cm) 0.1 cm 08/11/20 1504   Wound Surface Area (cm^2) 0.16 cm^2 08/11/20 1504   Wound Volume (cm^3) 0.02 cm^3 08/11/20 1504   Post-Procedure Length (cm) 0.4 cm 08/11/20 1504   Post-Procedure Width (cm) 0.4 cm 08/11/20 1504   Post-Procedure Depth (cm) 0.1 cm 08/11/20 1504   Post-Procedure Surface Area (cm^2) 0.16 cm^2 08/11/20 1504   Post-Procedure Volume (cm^3) 0.02 cm^3 08/11/20 1504   Wound Assessment Drainage; White 08/11/20 1504   Drainage Amount Copious 08/11/20 1504   Drainage Description Serous 08/11/20 1504   Odor None 08/11/20 1504   Margins Defined edges 08/11/20 1504   Trish-wound Assessment Intact;Edema 08/11/20 1504   Other%Wound Bed white 100 08/11/20 1504   Number of days: 0          Percent of Wound(s)/Ulcer(s) Debrided: 100%    Estimated Blood Loss:  None    Hemostasis Achieved:  by pressure    Procedural Pain:  2  / 10     Post Procedural Pain:  2 / 10     Response to treatment:  Well tolerated by patient. Plan:   KERRAMAX WITH ADHESIVE BORDER TO BE APPLIED TO BOTH WOUNDS. CHANGED REGULARLY.     Ayden Russo 426 2 WEEKS     Treatment Note please see attached Discharge Instructions    Written patient dismissal instructions given to patient and signed by patient or POA. Discharge Instructions          [place] -Phone: [phone] Fax: [phone]   Visit  Discharge Instructions / Physician Orders    DATE: 08/11/2020     Home Care:      SUPPLIES ORDERED THRU:      Wound Location:       Cleanse with: Liquid antibacterial soap and water, rinse well      Dressing Orders:      Frequency:      Additional Orders: Increase protein to diet (meat, cheese, eggs, fish, peanut butter, nuts and beans)  Multivitamin daily  ELEVATE LEGS AS MUCH AS POSSIBLE     Your next appointment with [facility] is in 1 week                                                                                                   ROOM TYPE   [] CHAIR     [] STRETCHER  [] EITHER             (Please note your next appointment above and if you are unable to keep, kindly give a 24 hour notice. Thank you.)     If you experience any of the following, please call [facility] during business hours:  [phone]     * Increase in Pain  * Temperature over 101  * Increase in drainage from your wound  * Drainage with a foul odor  * Bleeding  * Increase in swelling  * Need for compression bandage changes due to slippage, breakthrough drainage. If you need medical attention outside of the business hours of the [facility] please contact your PCP or go to the nearest emergency room. The information contained in the After Visit Summary has been reviewed with me, the patient and/or responsible adult, by my health care provider(s). I had the opportunity to ask questions regarding this information.  I have elected to receive;      []After Visit Summary  [x]Comprehensive Discharge Instruction      Patient signature______________________________________Date:________          Electronically signed by Patrice Griffin DPM on 8/11/2020 at 3:27 PM

## 2020-08-11 NOTE — PROGRESS NOTES
[address]:     Other [address]:     [facility] [address] [address]  [phone]  WOUND CARE Dept: [phone]   FAX NUMBER [unfilled]    Patient Information:      Tg Youngblood  [address] [place] [address]   [phone]   : 1970  AGE: 51 y.o. GENDER: female   TODAYS DATE:  2020    Insurance:      PRIMARY INSURANCE:  Plan:   Coverage:   Effective Date:   [id number] - (BX Traditional)    SECONDARY INSURANCE:  Plan:   Coverage:   Effective Date:   [unfilled]    [unfilled]   [unfilled]     Patient Wound Information:      Problem List Items Addressed This Visit     Lymphedema    Lower limb ulcer, thigh, right, with fat layer exposed ([address]    Chronic ulcer of thigh, left, with fat layer exposed ([address]          WOUNDS REQUIRING DRESSING SUPPLIES:     Wound 20 Leg Right; Lower;Medial #1 (Active)   Wound Image   20 1504   Dressing Status New drainage; Old drainage 20 1504   Dressing Changed Changed/New 20 1504   Wound Cleansed Rinsed/Irrigated with saline 20 1504   Wound Length (cm) 0.1 cm 20 1504   Wound Width (cm) 0.1 cm 20 1504   Wound Depth (cm) 0.1 cm 20 1504   Wound Surface Area (cm^2) 0.01 cm^2 20 1504   Wound Volume (cm^3) 0 cm^3 20 1504   Post-Procedure Length (cm) 0.1 cm 20 1504   Post-Procedure Width (cm) 0.1 cm 20 1504   Post-Procedure Depth (cm) 0.1 cm 20 1504   Post-Procedure Surface Area (cm^2) 0.01 cm^2 20 1504   Post-Procedure Volume (cm^3) 0 cm^3 20 1504   Wound Assessment Drainage; White 20 1504   Drainage Amount Copious 20 1504   Drainage Description Serous 20 1504   Odor None 20 1504   Margins Defined edges 20 1504   Other%Wound Bed White 100 20 1504   Number of days: 0       Wound 20 Leg Left; Lower;Medial (Active)   Wound Image   20 1504   Dressing Status New drainage; Old drainage 08/11/20 1504   Dressing Changed Changed/New 08/11/20 1504   Wound Cleansed Rinsed/Irrigated with saline 08/11/20 1504   Wound Length (cm) 0.4 cm 08/11/20 1504   Wound Width (cm) 0.4 cm 08/11/20 1504   Wound Depth (cm) 0.1 cm 08/11/20 1504   Wound Surface Area (cm^2) 0.16 cm^2 08/11/20 1504   Wound Volume (cm^3) 0.02 cm^3 08/11/20 1504   Post-Procedure Length (cm) 0.4 cm 08/11/20 1504   Post-Procedure Width (cm) 0.4 cm 08/11/20 1504   Post-Procedure Depth (cm) 0.1 cm 08/11/20 1504   Post-Procedure Surface Area (cm^2) 0.16 cm^2 08/11/20 1504   Post-Procedure Volume (cm^3) 0.02 cm^3 08/11/20 1504   Wound Assessment Drainage; White 08/11/20 1504   Drainage Amount Copious 08/11/20 1504   Drainage Description Serous 08/11/20 1504   Odor None 08/11/20 1504   Margins Defined edges 08/11/20 1504   Trish-wound Assessment Intact;Edema 08/11/20 1504   Other%Wound Bed white 100 08/11/20 1504   Number of days: 0          Supplies Requested :      WOUND #: 1 and 2   PRIMARY DRESSING:  Alginate pad-KerraMax Gentle Border   Cover and Secure with:  Other KerraMax Gentle Border     FREQUENCY OF DRESSING CHANGES:  Daily       ADDITIONAL ITEMS:  [] Gloves Small  [x] Gloves Medium [] Gloves Large [] Gloves XLarge  [] Tape 1\" [x] Tape 2\" [] Tape 3\"  [] Medipore Tape  [x] Saline  [] Skin Prep   [] Adhesive Remover   [] Cotton Tip Applicators   [] Other:    Patient Wound(s) Debrided: [x] Yes   [] No    Debribement Type: subcutaneous tissue    Debridement Date: 8/11/2020    Patient currently being seen by Home Health: [] Yes   [x] No    Duration for needed supplies:  []15  [x]30  []60  []90 Days    Provider Information:     Providers Name: Dr. Joo Teixeira DPM    NPI: [id number]

## 2020-08-13 ENCOUNTER — HOSPITAL ENCOUNTER (INPATIENT)
Age: 50
LOS: 8 days | Discharge: HOME | DRG: 570 | End: 2020-08-21
Attending: EMERGENCY MEDICINE | Admitting: FAMILY MEDICINE
Payer: COMMERCIAL

## 2020-08-13 PROBLEM — L03.119 CELLULITIS OF ANTERIOR LOWER LEG: Status: ACTIVE | Noted: 2020-08-13

## 2020-08-13 PROBLEM — E66.01 MORBID OBESITY (HCC): Status: ACTIVE | Noted: 2020-08-13

## 2020-08-13 LAB
ABSOLUTE EOS #: 0.03 K/UL (ref 0–0.44)
ABSOLUTE IMMATURE GRANULOCYTE: 0.06 K/UL (ref 0–0.3)
ABSOLUTE LYMPH #: 1.09 K/UL (ref 1.1–3.7)
ABSOLUTE MONO #: 0.45 K/UL (ref 0.1–1.2)
ALBUMIN SERPL-MCNC: 3.5 G/DL (ref 3.5–5.2)
ALBUMIN/GLOBULIN RATIO: NORMAL (ref 1–2.5)
ALP BLD-CCNC: 56 U/L (ref 35–104)
ALT SERPL-CCNC: 11 U/L (ref 5–33)
ANION GAP SERPL CALCULATED.3IONS-SCNC: 12 MMOL/L (ref 9–17)
AST SERPL-CCNC: 11 U/L
BASOPHILS # BLD: 0 % (ref 0–2)
BASOPHILS ABSOLUTE: <0.03 K/UL (ref 0–0.2)
BILIRUB SERPL-MCNC: 0.64 MG/DL (ref 0.3–1.2)
BUN BLDV-MCNC: 13 MG/DL (ref 6–20)
BUN/CREAT BLD: 16 (ref 9–20)
CALCIUM SERPL-MCNC: 9 MG/DL (ref 8.6–10.4)
CHLORIDE BLD-SCNC: 104 MMOL/L (ref 98–107)
CO2: 25 MMOL/L (ref 20–31)
CREAT SERPL-MCNC: 0.82 MG/DL (ref 0.5–0.9)
DIFFERENTIAL TYPE: ABNORMAL
EKG ATRIAL RATE: 115 BPM
EKG P AXIS: 66 DEGREES
EKG P-R INTERVAL: 154 MS
EKG Q-T INTERVAL: 328 MS
EKG QRS DURATION: 80 MS
EKG QTC CALCULATION (BAZETT): 453 MS
EKG R AXIS: 10 DEGREES
EKG T AXIS: 30 DEGREES
EKG VENTRICULAR RATE: 115 BPM
EOSINOPHILS RELATIVE PERCENT: 0 % (ref 1–4)
GFR AFRICAN AMERICAN: >60 ML/MIN
GFR NON-AFRICAN AMERICAN: >60 ML/MIN
GFR SERPL CREATININE-BSD FRML MDRD: NORMAL ML/MIN/{1.73_M2}
GFR SERPL CREATININE-BSD FRML MDRD: NORMAL ML/MIN/{1.73_M2}
GLUCOSE BLD-MCNC: 99 MG/DL (ref 70–99)
HCT VFR BLD CALC: 36.6 % (ref 36.3–47.1)
HEMOGLOBIN: 11.6 G/DL (ref 11.9–15.1)
IMMATURE GRANULOCYTES: 1 %
LACTIC ACID: 1 MMOL/L (ref 0.5–2.2)
LYMPHOCYTES # BLD: 11 % (ref 24–43)
MCH RBC QN AUTO: 28.9 PG (ref 25.2–33.5)
MCHC RBC AUTO-ENTMCNC: 31.7 G/DL (ref 28.4–34.8)
MCV RBC AUTO: 91 FL (ref 82.6–102.9)
MONOCYTES # BLD: 5 % (ref 3–12)
NRBC AUTOMATED: 0 PER 100 WBC
PDW BLD-RTO: 16.2 % (ref 11.8–14.4)
PLATELET # BLD: 236 K/UL (ref 138–453)
PLATELET ESTIMATE: ABNORMAL
PMV BLD AUTO: 9.5 FL (ref 8.1–13.5)
POTASSIUM SERPL-SCNC: 3.7 MMOL/L (ref 3.7–5.3)
RBC # BLD: 4.02 M/UL (ref 3.95–5.11)
RBC # BLD: ABNORMAL 10*6/UL
SEG NEUTROPHILS: 83 % (ref 36–65)
SEGMENTED NEUTROPHILS ABSOLUTE COUNT: 8.21 K/UL (ref 1.5–8.1)
SODIUM BLD-SCNC: 141 MMOL/L (ref 135–144)
TOTAL PROTEIN: 6.8 G/DL (ref 6.4–8.3)
TROPONIN INTERP: NORMAL
TROPONIN T: NORMAL NG/ML
TROPONIN, HIGH SENSITIVITY: 8 NG/L (ref 0–14)
WBC # BLD: 9.9 K/UL (ref 3.5–11.3)
WBC # BLD: ABNORMAL 10*3/UL

## 2020-08-13 PROCEDURE — 6360000002 HC RX W HCPCS: Performed by: FAMILY MEDICINE

## 2020-08-13 PROCEDURE — 6370000000 HC RX 637 (ALT 250 FOR IP): Performed by: FAMILY MEDICINE

## 2020-08-13 PROCEDURE — 99222 1ST HOSP IP/OBS MODERATE 55: CPT | Performed by: FAMILY MEDICINE

## 2020-08-13 PROCEDURE — 80053 COMPREHEN METABOLIC PANEL: CPT

## 2020-08-13 PROCEDURE — 2580000003 HC RX 258: Performed by: EMERGENCY MEDICINE

## 2020-08-13 PROCEDURE — 6370000000 HC RX 637 (ALT 250 FOR IP): Performed by: EMERGENCY MEDICINE

## 2020-08-13 PROCEDURE — 93005 ELECTROCARDIOGRAM TRACING: CPT | Performed by: EMERGENCY MEDICINE

## 2020-08-13 PROCEDURE — 85025 COMPLETE CBC W/AUTO DIFF WBC: CPT

## 2020-08-13 PROCEDURE — 6360000002 HC RX W HCPCS: Performed by: EMERGENCY MEDICINE

## 2020-08-13 PROCEDURE — 84484 ASSAY OF TROPONIN QUANT: CPT

## 2020-08-13 PROCEDURE — 1200000000 HC SEMI PRIVATE

## 2020-08-13 PROCEDURE — 2580000003 HC RX 258: Performed by: FAMILY MEDICINE

## 2020-08-13 PROCEDURE — 99284 EMERGENCY DEPT VISIT MOD MDM: CPT

## 2020-08-13 PROCEDURE — 87040 BLOOD CULTURE FOR BACTERIA: CPT

## 2020-08-13 PROCEDURE — 93010 ELECTROCARDIOGRAM REPORT: CPT | Performed by: INTERNAL MEDICINE

## 2020-08-13 PROCEDURE — 83605 ASSAY OF LACTIC ACID: CPT

## 2020-08-13 RX ORDER — SODIUM CHLORIDE 0.9 % (FLUSH) 0.9 %
10 SYRINGE (ML) INJECTION EVERY 12 HOURS SCHEDULED
Status: DISCONTINUED | OUTPATIENT
Start: 2020-08-13 | End: 2020-08-21 | Stop reason: HOSPADM

## 2020-08-13 RX ORDER — SODIUM CHLORIDE 0.9 % (FLUSH) 0.9 %
10 SYRINGE (ML) INJECTION PRN
Status: DISCONTINUED | OUTPATIENT
Start: 2020-08-13 | End: 2020-08-21 | Stop reason: HOSPADM

## 2020-08-13 RX ORDER — OXYCODONE HYDROCHLORIDE AND ACETAMINOPHEN 5; 325 MG/1; MG/1
1 TABLET ORAL ONCE
Status: COMPLETED | OUTPATIENT
Start: 2020-08-13 | End: 2020-08-13

## 2020-08-13 RX ORDER — PANTOPRAZOLE SODIUM 40 MG/1
40 TABLET, DELAYED RELEASE ORAL
Status: DISCONTINUED | OUTPATIENT
Start: 2020-08-14 | End: 2020-08-13

## 2020-08-13 RX ORDER — OXYBUTYNIN CHLORIDE 15 MG/1
15 TABLET, EXTENDED RELEASE ORAL 3 TIMES DAILY
COMMUNITY

## 2020-08-13 RX ORDER — OXYBUTYNIN CHLORIDE 5 MG/1
15 TABLET, EXTENDED RELEASE ORAL 3 TIMES DAILY
Status: DISCONTINUED | OUTPATIENT
Start: 2020-08-14 | End: 2020-08-21 | Stop reason: HOSPADM

## 2020-08-13 RX ORDER — BUMETANIDE 1 MG/1
1 TABLET ORAL DAILY
Status: DISCONTINUED | OUTPATIENT
Start: 2020-08-13 | End: 2020-08-14

## 2020-08-13 RX ORDER — ACETAMINOPHEN 325 MG/1
650 TABLET ORAL EVERY 6 HOURS PRN
Status: DISCONTINUED | OUTPATIENT
Start: 2020-08-13 | End: 2020-08-21 | Stop reason: HOSPADM

## 2020-08-13 RX ORDER — ACETAMINOPHEN 650 MG/1
650 SUPPOSITORY RECTAL EVERY 6 HOURS PRN
Status: DISCONTINUED | OUTPATIENT
Start: 2020-08-13 | End: 2020-08-21 | Stop reason: HOSPADM

## 2020-08-13 RX ORDER — OXYBUTYNIN CHLORIDE 5 MG/1
15 TABLET, EXTENDED RELEASE ORAL 2 TIMES DAILY
Status: DISCONTINUED | OUTPATIENT
Start: 2020-08-13 | End: 2020-08-13

## 2020-08-13 RX ORDER — OXYCODONE AND ACETAMINOPHEN 10; 325 MG/1; MG/1
1 TABLET ORAL EVERY 4 HOURS PRN
Status: DISCONTINUED | OUTPATIENT
Start: 2020-08-13 | End: 2020-08-21 | Stop reason: HOSPADM

## 2020-08-13 RX ADMIN — VANCOMYCIN HYDROCHLORIDE 2500 MG: 1 INJECTION, POWDER, LYOPHILIZED, FOR SOLUTION INTRAVENOUS at 20:51

## 2020-08-13 RX ADMIN — OXYCODONE HYDROCHLORIDE AND ACETAMINOPHEN 1 TABLET: 5; 325 TABLET ORAL at 16:20

## 2020-08-13 RX ADMIN — OXYCODONE HYDROCHLORIDE AND ACETAMINOPHEN 1 TABLET: 10; 325 TABLET ORAL at 21:46

## 2020-08-13 RX ADMIN — PIPERACILLIN SODIUM AND TAZOBACTAM SODIUM 4.5 G: 4; .5 INJECTION, POWDER, LYOPHILIZED, FOR SOLUTION INTRAVENOUS at 19:47

## 2020-08-13 RX ADMIN — CEFEPIME HYDROCHLORIDE 2 G: 2 INJECTION, POWDER, FOR SOLUTION INTRAVENOUS at 18:34

## 2020-08-13 RX ADMIN — BUMETANIDE 1 MG: 1 TABLET ORAL at 19:47

## 2020-08-13 RX ADMIN — ENOXAPARIN SODIUM 40 MG: 40 INJECTION SUBCUTANEOUS at 21:46

## 2020-08-13 ASSESSMENT — PAIN DESCRIPTION - PAIN TYPE: TYPE: ACUTE PAIN

## 2020-08-13 ASSESSMENT — ENCOUNTER SYMPTOMS
WHEEZING: 0
VOICE CHANGE: 0
SORE THROAT: 0
COUGH: 0
DIARRHEA: 0
CONSTIPATION: 0
COLOR CHANGE: 1
VOMITING: 0
ABDOMINAL PAIN: 0
BLOOD IN STOOL: 0
SHORTNESS OF BREATH: 0
NAUSEA: 0
SINUS PRESSURE: 0

## 2020-08-13 ASSESSMENT — PAIN DESCRIPTION - ORIENTATION: ORIENTATION: RIGHT;LEFT

## 2020-08-13 ASSESSMENT — PAIN SCALES - GENERAL
PAINLEVEL_OUTOF10: 9
PAINLEVEL_OUTOF10: 8
PAINLEVEL_OUTOF10: 8

## 2020-08-13 ASSESSMENT — PAIN DESCRIPTION - DESCRIPTORS: DESCRIPTORS: ACHING;CONSTANT;DULL

## 2020-08-13 ASSESSMENT — PAIN DESCRIPTION - FREQUENCY: FREQUENCY: CONTINUOUS

## 2020-08-13 ASSESSMENT — PAIN DESCRIPTION - LOCATION: LOCATION: LEG

## 2020-08-13 NOTE — ED NOTES
Patient was brought  Into  by private auto. Advised by patient that she has a chronic hx of lymphedema  To her legs. . has been treated for cellulitis to both legs since June and has been treated with antibiotics, . Has recently treated for mrsa  To her legs. And is being care for at the wound care center . Claims that she has developed  increased swelling  Redness and seepageof drainage to bilateral and posterior aspect both legs. Over the past week. Upon arrival patient is alert/oriented. Respirations  Non labored    Has previously identified she has large amt of swelling and redness to bilateral  Legs  Has poo mobility+ . Being evaluated by doctor goldberger.  . Lab work dayana Fan RN  08/13/20 2152

## 2020-08-13 NOTE — PROGRESS NOTES
Transitions of Care Pharmacy Service   Medication Review    The patient's list of current home medications has been reviewed and updated. Source(s) of information: Patient/[facility]    Please feel free to call with any questions about this encounter. Thank you. Irma Ramirez, [facility]  Transitions of Care Pharmacy Service  Phone:  [phone]  Fax: [phone]        Prior to Admission medications    Medication Sig Start Date End Date Taking? Authorizing Provider   oxybutynin (DITROPAN XL) 15 MG extended release tablet Take 15 mg by mouth 3 times daily   Yes Historical Provider, MD   diclofenac (PENNSAID) 2 % SOLN Place onto the skin 3 times daily as needed (pain)   Yes Historical Provider, MD   oxyCODONE-acetaminophen (PERCOCET)  MG per tablet Take 1 tablet by mouth every 4-6 hours as needed for Pain. Yes Historical Provider, MD   amphetamine-dextroamphetamine (ADDERALL) 20 MG tablet Take 20 mg by mouth 2 times daily as needed.     Yes Historical Provider, MD   bumetanide (BUMEX) 1 MG tablet Take 1 mg by mouth daily   Yes Historical Provider, MD   Doxycycline Hyclate 50 MG TABS Take 50 mg by mouth daily    Yes Historical Provider, MD   amLODIPine (NORVASC) 5 MG tablet Take 1 tablet by mouth daily 8/2/19  Yes Ramila Rodriguez MD   albuterol (PROVENTIL HFA) 108 (90 BASE) MCG/ACT inhaler Inhale 1-2 puffs into the lungs every 4 hours as needed for Wheezing 6/21/15  Yes Soraya Pierre MD

## 2020-08-13 NOTE — ED NOTES
Attempts made to establish int without success . Attempts made with staff experienced in use of doppler without success.       Mamta Mark RN  08/13/20 6215

## 2020-08-13 NOTE — ED PROVIDER NOTES
EMERGENCY DEPARTMENT ENCOUNTER    Pt Name: Boyce Homans  MRN: [id number]  [place] 1970  Date of evaluation: 8/13/20  CHIEF COMPLAINT       Chief Complaint   Patient presents with    Cellulitis     L leg     HISTORY OF PRESENT ILLNESS   56-year-old female presents to the emergency room with swelling pain and erythema to the upper thighs. Patient suffers from lymphedema and has been dealing with issues with cellulitis in the upper thighs since June of this year. She has been on several different antibiotics most recently doxycycline which she is currently taking. Patient states that she continues to have swelling and warmth with progression of her symptoms. Patient denies any fever at home. She has had some mild nausea intermittently over the last few days. She has also noticed an increase in her blood pressure over the last several days. REVIEW OF SYSTEMS     Review of Systems   All other systems reviewed and are negative. PASTMEDICAL HISTORY     Past Medical History:   Diagnosis Date    Anxiety     Asthma     Headache(784.0)     Hypertension      Past Problem List  Patient Active Problem List   Diagnosis Code    Lymphedema I89.0    Lower limb ulcer, thigh, right, with fat layer exposed ([address] L97.112    Chronic ulcer of thigh, left, with fat layer exposed ([address] L97.122     SURGICAL HISTORY       Past Surgical History:   Procedure Laterality Date    GASTRIC BAND       CURRENT MEDICATIONS       Previous Medications    ALBUTEROL (PROVENTIL HFA) 108 (90 BASE) MCG/ACT INHALER    Inhale 1-2 puffs into the lungs every 4 hours as needed for Wheezing    AMLODIPINE (NORVASC) 5 MG TABLET    Take 1 tablet by mouth daily    AMPHETAMINE-DEXTROAMPHETAMINE (ADDERALL) 20 MG TABLET    Take 20 mg by mouth 2 times daily.     BUMETANIDE (BUMEX) 1 MG TABLET    Take 1 mg by mouth daily    CLOCORTOLONE PIVALATE (CLODERM PUMP) 0.1 % CREA    Apply 3 g topically daily    DOXYCYCLINE HYCLATE PO    Take 50 mg by mouth daily    FUROSEMIDE (LASIX) 40 MG TABLET    Take 40 mg by mouth 2 times daily    LISDEXAMFETAMINE (VYVANSE) 70 MG CAPSULE    Take 1 capsule by mouth every morning for 30 days. NAPROXEN (NAPROSYN) 500 MG TABLET    Take 1 tablet by mouth 2 times daily (with meals)    OMEPRAZOLE (PRILOSEC) 40 MG DELAYED RELEASE CAPSULE    Take 1 capsule by mouth 2 times daily    ONDANSETRON (ZOFRAN ODT) 4 MG DISINTEGRATING TABLET    Take 1 tablet by mouth every 8 hours as needed for Nausea or Vomiting    OXYBUTYNIN (DITROPAN XL) 15 MG EXTENDED RELEASE TABLET    Take 1 tablet by mouth 2 times daily    OXYCODONE-ACETAMINOPHEN (PERCOCET)  MG PER TABLET    Take 1 tablet by mouth every 4 hours as needed for Pain. PERMETHRIN (ELIMITE) 5 % CREAM    Apply topically as directed    PREDNISONE (DELTASONE) 20 MG TABLET    3 PO [id number], then 2 PO [id number], 1 PO [id number], 1/2 PO YMYc5lzcd, 1/2 PO [id number]     ALLERGIES     is allergic to clindamycin/lincomycin; estrogens; fish-derived products; glucophage [metformin hcl]; ibuprofen; iodine; lisinopril; medrol [methylprednisolone]; metformin and related; morphine; and nsaids. FAMILY HISTORY     She indicated that her mother is alive. She indicated that her father is . SOCIAL HISTORY       Social History     Tobacco Use    Smoking status: Never Smoker    Smokeless tobacco: Never Used   Substance Use Topics    Alcohol use: Yes     Comment: social    Drug use: No     PHYSICAL EXAM     INITIAL VITALS: BP (!) 152/109   Pulse 100   Temp 98.5 °F (36.9 °C) (Oral)   Resp 24   Ht 5' 4\" (1.626 m)   Wt (!) 415 lb (188.2 kg)   SpO2 100%   BMI 71.23 kg/m²    Physical Exam  Constitutional:       General: She is not in acute distress. Appearance: She is well-developed. HENT:      Head: Normocephalic. Eyes:      Pupils: Pupils are equal, round, and reactive to light. Cardiovascular:      Rate and Rhythm: Normal rate and regular rhythm.       Heart sounds: Normal heart sounds. Pulmonary:      Effort: Pulmonary effort is normal. No respiratory distress. Breath sounds: Normal breath sounds. Abdominal:      General: Bowel sounds are normal.      Palpations: Abdomen is soft. Tenderness: There is no abdominal tenderness. Musculoskeletal: Normal range of motion. Skin:     General: Skin is warm and dry. Comments: Lymphedema, substantially swollen extremities  Lower extremities have large areas of warmth and erythema to the upper thighs medially with no major ulcers. Scattered small closed wounds   Neurological:      Mental Status: She is alert and oriented to person, place, and time. MEDICAL DECISION MAKIN-year-old female coming into the emergency room with bilateral discomfort warmth swelling and redness to the medial thighs which has been worsening over the last several weeks. Patient has been on outpatient antibiotic therapy without improvement of her symptoms. Given her physical exam and the fact that she has failed several rounds of outpatient treatments I feel she most likely needs to be admitted for IV antibiotics and wound care management. Patient is afebrile here in the emergency room and has no laboratory evidence for sepsis. CRITICAL CARE:       PROCEDURES:    Procedures    DIAGNOSTIC RESULTS   EKG:All EKG's are interpreted by the Emergency Department Physician who either signs or Co-signs this chart in the absence of a cardiologist.    EKG- sinus with rate 115  No ST changes  QTc 453    RADIOLOGY:All plain film, CT, MRI, and formal ultrasound images (except ED bedside ultrasound) are read by the radiologist, see reports below, unless otherwisenoted in MDM or here. No orders to display     LABS: All lab results were reviewed by myself, and all abnormals are listed below.   Labs Reviewed   CULTURE, BLOOD 1   CULTURE, BLOOD 1   CBC WITH AUTO DIFFERENTIAL   COMPREHENSIVE METABOLIC PANEL W/ REFLEX TO MG FOR LOW K LACTIC ACID   TROPONIN       EMERGENCY DEPARTMENTCOURSE:         Vitals:    Vitals:    08/13/20 1336   BP: (!) 152/109   Pulse: 100   Resp: 24   Temp: 98.5 °F (36.9 °C)   TempSrc: Oral   SpO2: 100%   Weight: (!) 415 lb (188.2 kg)   Height: 5' 4\" (1.626 m)       The patient was given the following medications while in the emergency department:  No orders of the defined types were placed in this encounter. CONSULTS:  None    FINAL IMPRESSION    No diagnosis found. DISPOSITION/PLAN   DISPOSITION        PATIENT REFERRED TO:  No follow-up provider specified.   DISCHARGE MEDICATIONS:  New Prescriptions    No medications on file     Rui Lang MD  Attending Emergency Physician                  Marion Garcia MD  08/13/20 3215

## 2020-08-13 NOTE — ED NOTES
Patient waiting for admission bed assignment , repoert given to naveen night shift michel.       Gibran Zelaya RN  08/13/20 7318

## 2020-08-13 NOTE — H&P
[facility] - INPATIENT      HISTORY AND PHYSICAL EXAMINATION            Date:   8/13/2020  Patient name:  Zach Parmar  Date of admission:  8/13/2020  2:12 PM  MRN:   [id number]  Account:  [de-identified]  YOB: 1970  PCP:    Rod Nobles MD  Room:   [place] [id number]  Code Status:    No Order Full Code     Chief Complaint:     Chief Complaint   Patient presents with    Cellulitis     L leg       History Obtained From:     patient, electronic medical record    History of Present Illness: The patient is a 51 y.o. Non-/non  female who presents with Cellulitis (L leg)   and she is admitted to the hospital for the management of  Cellulitis of anterior lower leg. Patient was brought to emergency room by her sister for bilateral lower extremity Swelling, redness, pain. Patient has known history of lymphedema, morbid obesity. She has been struggling with lower extremity swelling for last 2 to 3 months. She is homebound and has been visited by her PCP for home visits. She was treated with multiple courses of antibiotics and last 2 months. Patient also has scheduled appointment with lymphedema clinic. She noted redness and left leg around the knee and inner thigh associated with pain. Patient also reported some drainage in last week. Patient also complains of swelling and pain in right inner thigh. She denies any fevers but has been having chills. Evaluation emergency room showed temperature 98.5, blood pressure 152/109, BMI 71.4 lab evaluation showed normal electrolytes, kidney function. Patient reports that she has difficulty in ambulation due to hip fracture and knee arthritis. She is on long-term diuretics Bumex and Lasix. Patient had gastric banding before.     Past Medical History:     Past Medical History:   Diagnosis Date    Anxiety     Asthma     Headache(784.0)     Hypertension         Past Surgical History:     Past Surgical History:   Procedure Laterality Date    GASTRIC BAND          Medications Prior to Admission:     Prior to Admission medications    Medication Sig Start Date End Date Taking? Authorizing Provider   oxyCODONE-acetaminophen (PERCOCET)  MG per tablet Take 1 tablet by mouth every 4 hours as needed for Pain. Historical Provider, MD   amphetamine-dextroamphetamine (ADDERALL) 20 MG tablet Take 20 mg by mouth 2 times daily. Historical Provider, MD   furosemide (LASIX) 40 MG tablet Take 40 mg by mouth 2 times daily    Historical Provider, MD   bumetanide (BUMEX) 1 MG tablet Take 1 mg by mouth daily    Historical Provider, MD   DOXYCYCLINE HYCLATE PO Take 50 mg by mouth daily    Historical Provider, MD   oxybutynin (DITROPAN XL) 15 MG extended release tablet Take 1 tablet by mouth 2 times daily 8/2/19   Junior Chi MD   omeprazole (PRILOSEC) 40 MG delayed release capsule Take 1 capsule by mouth 2 times daily 8/2/19   Junior Chi MD   amLODIPine (NORVASC) 5 MG tablet Take 1 tablet by mouth daily 8/2/19   Junior Chi MD   predniSONE (DELTASONE) 20 MG tablet 3 PO [id number], then 2 PO SHGa4doai, 1 PO ETOi9vyao, 1/2 PO FVHo9qhur, 1/2 PO WOHg9cqnql 8/2/19   Junior Chi MD   lisdexamfetamine (VYVANSE) 70 MG capsule Take 1 capsule by mouth every morning for 30 days.  8/2/19 9/1/19  Junior Chi MD   ondansetron (ZOFRAN ODT) 4 MG disintegrating tablet Take 1 tablet by mouth every 8 hours as needed for Nausea or Vomiting 4/8/19   CASANDRA Rosas - CNP   clocortolone pivalate (CLODERM PUMP) 0.1 % CREA Apply 3 g topically daily 2/18/19   Junior Chi MD   naproxen (NAPROSYN) 500 MG tablet Take 1 tablet by mouth 2 times daily (with meals) 1/12/19   Alex Panchal PA-C   permethrin (ELIMITE) 5 % cream Apply topically as directed 1/6/18   Tigist Christina MD   albuterol (PROVENTIL HFA) 108 (90 BASE) MCG/ACT inhaler Inhale 1-2 puffs into the lungs every 4 hours as needed for Wheezing 6/21/15   Leopold Sahara Ayan Rasheed MD        Allergies:     Clindamycin/lincomycin; Estrogens; Fish-derived products; Glucophage [metformin hcl]; Ibuprofen; Iodine; Lisinopril; Medrol [methylprednisolone]; Metformin and related; Morphine; and Nsaids    Social History:     Tobacco:    reports that she has never smoked. She has never used smokeless tobacco.  Alcohol:      reports current alcohol use. Drug Use:  reports no history of drug use. Family History:     Family History   Problem Relation Age of Onset    Thyroid Disease Mother     Asthma Mother     Heart Failure Mother     COPD Mother     Heart Attack Father     Diabetes Father     Stroke Father        Review of Systems:     Positive and Negative as described in HPI. Review of Systems   Constitutional: Positive for chills. Negative for activity change, appetite change, fatigue, fever and unexpected weight change. HENT: Negative for congestion, mouth sores, postnasal drip, sinus pressure, sore throat and voice change. Eyes: Negative for visual disturbance. Respiratory: Negative for cough, shortness of breath and wheezing. Cardiovascular: Negative for chest pain and palpitations. Gastrointestinal: Negative for abdominal pain, blood in stool, constipation, diarrhea, nausea and vomiting. Endocrine: Negative for polyuria. Genitourinary: Negative for difficulty urinating, dysuria, frequency and urgency. Musculoskeletal: Negative for arthralgias, joint swelling and myalgias. Skin: Positive for color change and wound. Neurological: Negative for dizziness, tremors, speech difficulty, light-headedness and headaches. Physical Exam:   BP (!) 152/109   Pulse 100   Temp 98.5 °F (36.9 °C) (Oral)   Resp 24   Ht 5' 4\" (1.626 m)   Wt (!) 415 lb (188.2 kg)   SpO2 100%   BMI 71.23 kg/m²   Temp (24hrs), Av.5 °F (36.9 °C), Min:98.5 °F (36.9 °C), Max:98.5 °F (36.9 °C)    No results for input(s): POCGLU in the last 72 hours.   No intake or output data in the 24 hours ending 08/13/20 1816  Physical Exam  Vitals signs and nursing note reviewed. Constitutional:       General: She is not in acute distress. Appearance: She is morbidly obese. She is not diaphoretic. HENT:      Head: Normocephalic and atraumatic. Nose:      Right Sinus: No maxillary sinus tenderness or frontal sinus tenderness. Left Sinus: No maxillary sinus tenderness or frontal sinus tenderness. Mouth/Throat:      Pharynx: No oropharyngeal exudate. Eyes:      General: No scleral icterus. Conjunctiva/sclera: Conjunctivae normal.      Pupils: Pupils are equal, round, and reactive to light. Neck:      Musculoskeletal: Full passive range of motion without pain and neck supple. Thyroid: No thyromegaly. Vascular: No JVD. Cardiovascular:      Rate and Rhythm: Normal rate and regular rhythm. Pulses:           Dorsalis pedis pulses are 2+ on the right side and 2+ on the left side. Heart sounds: Normal heart sounds. No murmur. Pulmonary:      Effort: Pulmonary effort is normal.      Breath sounds: Normal breath sounds. No wheezing or rales. Abdominal:      Palpations: Abdomen is soft. There is no mass. Tenderness: There is no abdominal tenderness. Musculoskeletal:      Comments: Right leg -swelling, tenderness whole thigh. Left leg -erythema, swelling, skin rash indurated. Tender to touch. Lymphadenopathy:      Head:      Right side of head: No submandibular adenopathy. Left side of head: No submandibular adenopathy. Cervical: No cervical adenopathy. Skin:     General: Skin is warm. Neurological:      Mental Status: She is alert and oriented to person, place, and time. Motor: No tremor. Psychiatric:         Behavior: Behavior is cooperative.          Investigations:      Laboratory Testing:  Recent Results (from the past 24 hour(s))   CBC Auto Differential    Collection Time: 08/13/20  4:14 PM   Result Value Ref Range    WBC 9.9 3.5 - 11.3 k/uL    RBC 4.02 3.95 - 5.11 m/uL    Hemoglobin 11.6 (L) 11.9 - 15.1 g/dL    Hematocrit 36.6 36.3 - 47.1 %    MCV 91.0 82.6 - 102.9 fL    MCH 28.9 25.2 - 33.5 pg    MCHC 31.7 28.4 - 34.8 g/dL    RDW 16.2 (H) 11.8 - 14.4 %    Platelets 390 039 - 599 k/uL    MPV 9.5 8.1 - 13.5 fL    NRBC Automated 0.0 0.0 per 100 WBC    Differential Type NOT REPORTED     Seg Neutrophils 83 (H) 36 - 65 %    Lymphocytes 11 (L) 24 - 43 %    Monocytes 5 3 - 12 %    Eosinophils % 0 (L) 1 - 4 %    Basophils 0 0 - 2 %    Immature Granulocytes 1 (H) 0 %    Segs Absolute 8.21 (H) 1.50 - 8.10 k/uL    Absolute Lymph # 1.09 (L) 1.10 - 3.70 k/uL    Absolute Mono # 0.45 0.10 - 1.20 k/uL    Absolute Eos # 0.03 0.00 - 0.44 k/uL    Basophils Absolute <0.03 0.00 - 0.20 k/uL    Absolute Immature Granulocyte 0.06 0.00 - 0.30 k/uL    WBC Morphology NOT REPORTED     RBC Morphology ANISOCYTOSIS PRESENT     Platelet Estimate NOT REPORTED    Comprehensive Metabolic Panel w/ Reflex to MG    Collection Time: 08/13/20  4:14 PM   Result Value Ref Range    Glucose 99 70 - 99 mg/dL    BUN 13 6 - 20 mg/dL    CREATININE 0.82 0.50 - 0.90 mg/dL    Bun/Cre Ratio 16 9 - 20    Calcium 9.0 8.6 - 10.4 mg/dL    Sodium 141 135 - 144 mmol/L    Potassium 3.7 3.7 - 5.3 mmol/L    Chloride 104 98 - 107 mmol/L    CO2 25 20 - 31 mmol/L    Anion Gap 12 9 - 17 mmol/L    Alkaline Phosphatase 56 35 - 104 U/L    ALT 11 5 - 33 U/L    AST 11 <32 U/L    Total Bilirubin 0.64 0.3 - 1.2 mg/dL    Total Protein 6.8 6.4 - 8.3 g/dL    Alb 3.5 3.5 - 5.2 g/dL    Albumin/Globulin Ratio NOT REPORTED 1.0 - 2.5    GFR Non-African American >60 >60 mL/min    GFR African American >60 >60 mL/min    GFR Comment          GFR Staging NOT REPORTED    Lactic Acid    Collection Time: 08/13/20  4:14 PM   Result Value Ref Range    Lactic Acid 1.0 0.5 - 2.2 mmol/L   Troponin    Collection Time: 08/13/20  4:14 PM   Result Value Ref Range    Troponin, High Sensitivity 8 0 - 14 ng/L    Troponin T NOT REPORTED <0.03 ng/mL    Troponin Interp NOT REPORTED    EKG 12 Lead    Collection Time: 08/13/20  5:23 PM   Result Value Ref Range    Ventricular Rate 115 BPM    Atrial Rate 115 BPM    P-R Interval 154 ms    QRS Duration 80 ms    Q-T Interval 328 ms    QTc Calculation (Bazett) 453 ms    P Axis 66 degrees    R Axis 10 degrees    T Axis 30 degrees       Imaging/Diagonstics:    No results found. Assessment :      Primary Problem  Cellulitis of anterior lower leg    Active Hospital Problems    Diagnosis Date Noted    Cellulitis of anterior lower leg [E15.031] 08/13/2020    Morbid obesity ([facility] [address] [E66.01] 08/13/2020    Lymphedema [I89.0] 08/11/2020       Plan:     Patient status Admit as inpatient in the  Med/Surge    1. Bilateral lower extremity lymphedema with acute cellulitis -IV vancomycin, Zosyn. Elevation leg. Follow blood culture sensitivity. Continue Bumex   2. Lymphedema bilateral lower extremity-will benefit compression stocking, lymphedema clinic. Patient has appointment scheduled already as outpatient. 3. Extreme obesity s/p gastric banding. Patient plans to follow-up with. Except she has outpatient. 4. Essential hypertension-stop amlodipine, changed lisinopril. Consultations:   IP CONSULT TO INTERNAL MEDICINE  PHARMACY TO DOSE VANCOMYCIN    Patient is admitted as inpatient status because of co-morbidities listed above, severity of signs and symptoms as outlined, requirement for current medical therapies and most importantly because of direct risk to patient if care not provided in a hospital setting.     Jemma Naranjo MD  8/13/2020    Copy sent to Dr. Kushal Urbano MD    (Please note that portions of this note were completed with a voice recognition program. Efforts were made to edit the dictations but occasionally words are mis-transcribed.)

## 2020-08-14 LAB
ALBUMIN SERPL-MCNC: 3.6 G/DL (ref 3.5–5.2)
ALBUMIN/GLOBULIN RATIO: ABNORMAL (ref 1–2.5)
ALP BLD-CCNC: 59 U/L (ref 35–104)
ALT SERPL-CCNC: 9 U/L (ref 5–33)
ANION GAP SERPL CALCULATED.3IONS-SCNC: 11 MMOL/L (ref 9–17)
AST SERPL-CCNC: 11 U/L
BILIRUB SERPL-MCNC: 0.81 MG/DL (ref 0.3–1.2)
BUN BLDV-MCNC: 11 MG/DL (ref 6–20)
BUN/CREAT BLD: 11 (ref 9–20)
CALCIUM IONIZED: 1.19 MMOL/L (ref 1.13–1.33)
CALCIUM SERPL-MCNC: 8.8 MG/DL (ref 8.6–10.4)
CHLORIDE BLD-SCNC: 100 MMOL/L (ref 98–107)
CO2: 28 MMOL/L (ref 20–31)
CREAT SERPL-MCNC: 1 MG/DL (ref 0.5–0.9)
GFR AFRICAN AMERICAN: >60 ML/MIN
GFR NON-AFRICAN AMERICAN: 59 ML/MIN
GFR SERPL CREATININE-BSD FRML MDRD: ABNORMAL ML/MIN/{1.73_M2}
GFR SERPL CREATININE-BSD FRML MDRD: ABNORMAL ML/MIN/{1.73_M2}
GLUCOSE BLD-MCNC: 93 MG/DL (ref 70–99)
HCT VFR BLD CALC: 37.1 % (ref 36.3–47.1)
HEMOGLOBIN: 11.6 G/DL (ref 11.9–15.1)
INR BLD: 1.1
MAGNESIUM: 1.9 MG/DL (ref 1.6–2.6)
MCH RBC QN AUTO: 28.5 PG (ref 25.2–33.5)
MCHC RBC AUTO-ENTMCNC: 31.3 G/DL (ref 28.4–34.8)
MCV RBC AUTO: 91.2 FL (ref 82.6–102.9)
NRBC AUTOMATED: 0 PER 100 WBC
PDW BLD-RTO: 16.2 % (ref 11.8–14.4)
PHOSPHORUS: 3.2 MG/DL (ref 2.6–4.5)
PLATELET # BLD: 236 K/UL (ref 138–453)
PMV BLD AUTO: 9.6 FL (ref 8.1–13.5)
POTASSIUM SERPL-SCNC: 3.5 MMOL/L (ref 3.7–5.3)
PROTHROMBIN TIME: 14.2 SEC (ref 11.5–14.2)
RBC # BLD: 4.07 M/UL (ref 3.95–5.11)
SODIUM BLD-SCNC: 139 MMOL/L (ref 135–144)
TOTAL PROTEIN: 7.1 G/DL (ref 6.4–8.3)
VANCOMYCIN TROUGH DATE LAST DOSE: ABNORMAL
VANCOMYCIN TROUGH DOSE AMOUNT: ABNORMAL
VANCOMYCIN TROUGH TIME LAST DOSE: ABNORMAL
VANCOMYCIN TROUGH: 24.6 UG/ML (ref 10–20)
WBC # BLD: 8.9 K/UL (ref 3.5–11.3)

## 2020-08-14 PROCEDURE — 6370000000 HC RX 637 (ALT 250 FOR IP): Performed by: FAMILY MEDICINE

## 2020-08-14 PROCEDURE — 2580000003 HC RX 258: Performed by: FAMILY MEDICINE

## 2020-08-14 PROCEDURE — 99232 SBSQ HOSP IP/OBS MODERATE 35: CPT | Performed by: FAMILY MEDICINE

## 2020-08-14 PROCEDURE — 83735 ASSAY OF MAGNESIUM: CPT

## 2020-08-14 PROCEDURE — 85610 PROTHROMBIN TIME: CPT

## 2020-08-14 PROCEDURE — 6370000000 HC RX 637 (ALT 250 FOR IP): Performed by: NURSE PRACTITIONER

## 2020-08-14 PROCEDURE — 36415 COLL VENOUS BLD VENIPUNCTURE: CPT

## 2020-08-14 PROCEDURE — 80202 ASSAY OF VANCOMYCIN: CPT

## 2020-08-14 PROCEDURE — 1200000000 HC SEMI PRIVATE

## 2020-08-14 PROCEDURE — 84100 ASSAY OF PHOSPHORUS: CPT

## 2020-08-14 PROCEDURE — 97116 GAIT TRAINING THERAPY: CPT

## 2020-08-14 PROCEDURE — 80053 COMPREHEN METABOLIC PANEL: CPT

## 2020-08-14 PROCEDURE — 6360000002 HC RX W HCPCS: Performed by: FAMILY MEDICINE

## 2020-08-14 PROCEDURE — 85027 COMPLETE CBC AUTOMATED: CPT

## 2020-08-14 PROCEDURE — 82330 ASSAY OF CALCIUM: CPT

## 2020-08-14 PROCEDURE — 97161 PT EVAL LOW COMPLEX 20 MIN: CPT

## 2020-08-14 RX ORDER — BUMETANIDE 1 MG/1
1 TABLET ORAL 2 TIMES DAILY
Status: DISCONTINUED | OUTPATIENT
Start: 2020-08-14 | End: 2020-08-21 | Stop reason: HOSPADM

## 2020-08-14 RX ADMIN — BUMETANIDE 1 MG: 1 TABLET ORAL at 17:38

## 2020-08-14 RX ADMIN — ENOXAPARIN SODIUM 40 MG: 40 INJECTION SUBCUTANEOUS at 08:45

## 2020-08-14 RX ADMIN — VANCOMYCIN HYDROCHLORIDE 1500 MG: 1.5 INJECTION, POWDER, LYOPHILIZED, FOR SOLUTION INTRAVENOUS at 14:27

## 2020-08-14 RX ADMIN — OXYCODONE HYDROCHLORIDE AND ACETAMINOPHEN 1 TABLET: 10; 325 TABLET ORAL at 11:19

## 2020-08-14 RX ADMIN — OXYBUTYNIN CHLORIDE 15 MG: 5 TABLET, EXTENDED RELEASE ORAL at 20:28

## 2020-08-14 RX ADMIN — PIPERACILLIN AND TAZOBACTAM 3.38 G: 3; .375 INJECTION, POWDER, LYOPHILIZED, FOR SOLUTION INTRAVENOUS at 17:38

## 2020-08-14 RX ADMIN — PIPERACILLIN AND TAZOBACTAM 3.38 G: 3; .375 INJECTION, POWDER, LYOPHILIZED, FOR SOLUTION INTRAVENOUS at 01:40

## 2020-08-14 RX ADMIN — BUMETANIDE 1 MG: 1 TABLET ORAL at 08:46

## 2020-08-14 RX ADMIN — VANCOMYCIN HYDROCHLORIDE 1500 MG: 1.5 INJECTION, POWDER, LYOPHILIZED, FOR SOLUTION INTRAVENOUS at 05:39

## 2020-08-14 RX ADMIN — OXYCODONE HYDROCHLORIDE AND ACETAMINOPHEN 1 TABLET: 10; 325 TABLET ORAL at 05:39

## 2020-08-14 RX ADMIN — PIPERACILLIN AND TAZOBACTAM 3.38 G: 3; .375 INJECTION, POWDER, LYOPHILIZED, FOR SOLUTION INTRAVENOUS at 08:45

## 2020-08-14 RX ADMIN — ENOXAPARIN SODIUM 40 MG: 40 INJECTION SUBCUTANEOUS at 20:28

## 2020-08-14 RX ADMIN — OXYCODONE HYDROCHLORIDE AND ACETAMINOPHEN 1 TABLET: 10; 325 TABLET ORAL at 21:43

## 2020-08-14 RX ADMIN — OXYCODONE HYDROCHLORIDE AND ACETAMINOPHEN 1 TABLET: 10; 325 TABLET ORAL at 17:38

## 2020-08-14 ASSESSMENT — ENCOUNTER SYMPTOMS
VOMITING: 0
COUGH: 0
NAUSEA: 0
CHEST TIGHTNESS: 0
BLOOD IN STOOL: 0
RHINORRHEA: 0
COLOR CHANGE: 1
CONSTIPATION: 0
DIARRHEA: 0
WHEEZING: 0
ABDOMINAL PAIN: 0
SHORTNESS OF BREATH: 0

## 2020-08-14 ASSESSMENT — PAIN SCALES - GENERAL
PAINLEVEL_OUTOF10: 6
PAINLEVEL_OUTOF10: 7
PAINLEVEL_OUTOF10: 8
PAINLEVEL_OUTOF10: 8
PAINLEVEL_OUTOF10: 0
PAINLEVEL_OUTOF10: 0
PAINLEVEL_OUTOF10: 8

## 2020-08-14 ASSESSMENT — PAIN DESCRIPTION - LOCATION
LOCATION: LEG
LOCATION: LEG

## 2020-08-14 ASSESSMENT — PAIN DESCRIPTION - DESCRIPTORS
DESCRIPTORS: CONSTANT
DESCRIPTORS: ACHING;CONSTANT;DULL

## 2020-08-14 ASSESSMENT — PAIN DESCRIPTION - ORIENTATION
ORIENTATION: RIGHT;LEFT
ORIENTATION: RIGHT;LEFT

## 2020-08-14 ASSESSMENT — PAIN DESCRIPTION - PAIN TYPE
TYPE: ACUTE PAIN
TYPE: ACUTE PAIN

## 2020-08-14 NOTE — PROGRESS NOTES
Physical Therapy    Facility/Department: [facility]  Initial Assessment    NAME: Navin Pal  : 1970  MRN: [id number]    Date of Service: 2020    Discharge Recommendations:  Home independently        Assessment   Body structures, Functions, Activity limitations: Decreased functional mobility ; Decreased endurance  Assessment: Patient able to ambulate with her cane, moderately short of breath, no need for assistance. Will treat her while in acute for stair training to prepare for DC home. Prognosis: Good  Decision Making: Low Complexity  Clinical Presentation: stable  PT Education: Goals; General Safety;Gait Training; Adaptive Device Training;Transfer Training;Plan of Care; Functional Mobility Training;Precautions  REQUIRES PT FOLLOW UP: Yes  Activity Tolerance  Activity Tolerance: Patient limited by fatigue;Patient limited by endurance       Patient Diagnosis(es): The encounter diagnosis was Cellulitis of lower extremity, unspecified laterality. has a past medical history of Anxiety, Asthma, Cellulitis, Headache(784.0), Hypertension, and Lymphedema. has a past surgical history that includes Gastric Band.     Restrictions  Restrictions/Precautions  Restrictions/Precautions: (Up with assist, up as tolerated)          Subjective  General  Chart Reviewed: Yes  Patient assessed for rehabilitation services?: Yes  Family / Caregiver Present: No  Follows Commands: Within Functional Limits  Pain Screening  Patient Currently in Pain: Denies  Vital Signs  Pulse: 126(immediately after gait)  Patient Currently in Pain: Denies  Height and Weight  Weight: (!) 415 lb (188.2 kg)  BMI (Calculated): 71.4  Oxygen Therapy  O2 Device: None (Room air)            Social/Functional History  Social/Functional History  Lives With: Alone  Type of Home: House  Home Layout: Two level  Home Access: Stairs to enter without rails  Entrance Stairs - Number of Steps: 2-3  Home Equipment: Cane  Ambulation Assistance: Independent  Transfer Assistance: Independent  Active : Yes  Type of occupation: Works for [employer], mostly from home at current  Additional Comments: Uses a motorized cart for [id number] [address]  Overall Cognitive Status: WFL    Objective     Observation/Palpation  Observation: Lymphedema both legs and thighs. She is 415 lbs       Strength RLE  Comment: R LE is her \"bad hip\" . Ankle and knee 4+/5  Strength LLE  Comment: Ankle and knee 4+/5        Bed mobility  Sit to Supine: Stand by assistance  Comment: Therapist showed her how to use gait belt as a leg  for her right LE  Transfers  Sit to Stand: Stand by assistance  Stand to sit: Stand by assistance  Bed to Chair: Stand by assistance  Ambulation  Ambulation?: Yes  Ambulation 1  Surface: level tile  Device: Single point cane  Assistance: Stand by assistance  Quality of Gait: Wide lateral weight shifts due to her morbid obesity  Gait Deviations: Increased ILA  Distance: 175'     Balance  Standing - Static: Fair  Standing - Dynamic: [facility]  Times per week: 1-2x/day, 5-6x/week  Current Treatment Recommendations: Strengthening, Home Exercise Program      AM-PAC Score  AM-PAC Inpatient Mobility Raw Score : 18 (08/14/20 1517)  AM-PAC Inpatient T-Scale Score : 43.63 (08/14/20 1517)  Mobility Inpatient CMS 0-100% Score: 46.58 (08/14/20 1517)  Mobility Inpatient CMS G-Code Modifier : CK (08/14/20 1517)          Goals  Short term goals  Time Frame for Short term goals: 10 visits  Short term goal 1: Ambulate 200' with straight cane with modified independence. Short term goal 2: Negotiate up and down the equivalent of 1 flight of steps, CGA.   Patient Goals   Patient goals : None       Therapy Time   Individual Concurrent Group Co-treatment   Time In 1430         Time Out 1505         Minutes 35         Timed Code Treatment Minutes: 21 Minutes       Cade Franz PT

## 2020-08-14 NOTE — CARE COORDINATION
Case Management Initial Discharge Plan  You Rl,         Readmission Risk              Risk of Unplanned Readmission:        9             Met with:patient to discuss discharge plans. Information verified: address, contacts, phone number, , insurance Yes  PCP: Dwain Bloom MD  Date of last visit: 2 weeks ago    Insurance Provider: Vani [address] 150    Discharge Planning  Current Residence:     Living Arrangements:  Friends   Home has 2 stories/2 steps into home bedroom upstairs 14 steps stairs to climb  Support Systems:  Family Members  Current Services PTA:    Supplier: [employer]  Patient able to perform ADL's:Independent  DME used to aid ambulation prior to admission: cane at times/during admissionTBD    Potential Assistance Needed:  Outpatient PT/OT, Outpatient IV    Pharmacy: [facility]   Potential Assistance Purchasing Medications:  No  Does patient want to participate in local refill/ meds to beds program?  Yes    Patient agreeable to home care: No  [place] of choice provided:  n/a      Type of Home Care Services:  None  Patient expects to be discharged to:  home    Prior SNF/Rehab Placement and Facility: no  Agreeable to SNF/Rehab: No  [place] of choice provided: n/a   Evaluation: no    Expected Discharge date:  20  Follow Up Appointment: Best Day/ Time:      Transportation provider: Family  Transportation arrangements needed for discharge: No    Discharge Plan: Met with patient to discuss dc options. Patient lives with friend in 2 level home. She has a cane. Her bath is on the first floor and her bedroom is upstairs. She is current with [facility]. Her plan is to return home. Discussed home care. She declined. No other needs identified.  [address]        Electronically signed by BONY Ortega on 20 at 12:35 PM EDT

## 2020-08-14 NOTE — PROGRESS NOTES
[address]      Daily Progress Note     Admit Date: 8/13/2020  Bed/Room No.  2005/2005-01  Admitting Physician : Cely Meraz MD  Code Status :[address] Day:  LOS: 1 day   Chief Complaint:     Chief Complaint   Patient presents with    Cellulitis     L leg     Principal Problem:    Cellulitis of anterior lower leg  Active Problems:    Lymphedema    Morbid obesity ([address]  Resolved Problems:    * No resolved hospital problems. *    Subjective : Interval History/Significant events :  08/14/20    Patient reports some improvement in redness, pain in lower extremity. Patient denies any fever, chills. She continues to have rash, redness, swelling, drainage from lower extremity wound. Vitals - Stable afebrile  Labs -hypokalemia, normal white count. Nursing notes , Consults notes reviewed. Overnight events and updates discussed with Nursing staff . Background History:         Ibeth Rockwell is 51 y.o. female  Who was admitted to the hospital on 8/13/2020 for treatment of Cellulitis of anterior lower leg. Patient was brought to emergency room by her sister for bilateral lower extremity Swelling, redness, pain. Patient has known history of lymphedema, morbid obesity. She has been struggling with lower extremity swelling for last 2 to 3 months. She is homebound and has been visited by her PCP for home visits. She was treated with multiple courses of antibiotics and last 2 months. Patient also has scheduled appointment with lymphedema clinic. She noted redness and left leg around the knee and inner thigh associated with pain. Patient also reported some drainage in last week. Patient also complains of swelling and pain in right inner thigh. She denies any fevers but has been having chills. Evaluation emergency room showed temperature 98.5, blood pressure 152/109, BMI 71.4 lab evaluation showed normal electrolytes, kidney function.    Patient reports that she has difficulty Objective :      Current Vitals : Temp: 98.6 °F (37 °C),  Pulse: 100, Resp: 15, BP: 137/77, SpO2: 100 %  Last 24 Hrs Vitals   Patient Vitals for the past 24 hrs:   BP Temp Temp src Pulse Resp SpO2 Height Weight   08/14/20 0806 137/77 98.6 °F (37 °C) Oral 100 15 100 % -- --   08/14/20 0449 -- -- -- -- -- -- -- (!) 415 lb (188.2 kg)   08/13/20 2146 -- -- -- -- -- -- 5' 4\" (1.626 m) (!) 415 lb 11.2 oz (188.6 kg)   08/13/20 2120 129/79 98.1 °F (36.7 °C) Oral 108 18 99 % -- --   08/13/20 1336 (!) 152/109 98.5 °F (36.9 °C) Oral 100 24 100 % 5' 4\" (1.626 m) (!) 415 lb (188.2 kg)     Intake / output   08/13 0701 - 08/14 0700  In: -   Out: 650 [Urine:650]  Physical Exam:  Physical Exam  Vitals signs and nursing note reviewed. Constitutional:       General: She is not in acute distress. Appearance: She is not diaphoretic. HENT:      Head: Normocephalic and atraumatic. Nose:      Right Sinus: No maxillary sinus tenderness or frontal sinus tenderness. Left Sinus: No maxillary sinus tenderness or frontal sinus tenderness. Mouth/Throat:      Pharynx: No oropharyngeal exudate. Eyes:      General: No scleral icterus. Conjunctiva/sclera: Conjunctivae normal.      Pupils: Pupils are equal, round, and reactive to light. Neck:      Musculoskeletal: Full passive range of motion without pain and neck supple. Thyroid: No thyromegaly. Vascular: No JVD. Cardiovascular:      Rate and Rhythm: Normal rate and regular rhythm. Pulses:           Dorsalis pedis pulses are 2+ on the right side and 2+ on the left side. Heart sounds: Normal heart sounds. No murmur. Pulmonary:      Effort: Pulmonary effort is normal.      Breath sounds: Normal breath sounds. No wheezing or rales. Abdominal:      Palpations: Abdomen is soft. There is no mass. Tenderness: There is no abdominal tenderness.    Musculoskeletal:      Comments: Bilateral lower extremity swelling, pitting edema, induration, swelling lower thigh with erythema, tender to touch. Lymphadenopathy:      Head:      Right side of head: No submandibular adenopathy. Left side of head: No submandibular adenopathy. Cervical: No cervical adenopathy. Skin:     General: Skin is warm. Neurological:      Mental Status: She is alert and oriented to person, place, and time. Motor: No tremor. Psychiatric:         Behavior: Behavior is cooperative. Laboratory findings:    Recent Labs     08/13/20  1614 08/14/20  0450   WBC 9.9 8.9   HGB 11.6* 11.6*   HCT 36.6 37.1    236   INR  --  1.1     Recent Labs     08/13/20  1614 08/14/20  0450    139   K 3.7 3.5*    100   CO2 25 28   GLUCOSE 99 93   BUN 13 11   CREATININE 0.82 1.00*   MG  --  1.9   CALCIUM 9.0 8.8   PHOS  --  3.2     Recent Labs     08/13/20  1614 08/14/20  0450   PROT 6.8 7.1   LABALBU 3.5 3.6   AST 11 11   ALT 11 9   ALKPHOS 56 59   BILITOT 0.64 0.81          Specific Gravity, UA   Date Value Ref Range Status   02/11/2016 1.010 1.005 - 1.030 Final     Protein, UA   Date Value Ref Range Status   02/11/2016 1+ (A) NEG Final     RBC, UA   Date Value Ref Range Status   02/11/2016 None 0 - 2 /HPF Final     Bacteria, UA   Date Value Ref Range Status   02/11/2016 FEW (A) NONE Final     Comment:     Performed at [address] [facility], [address] [place] [address] [phone]       Nitrite, Urine   Date Value Ref Range Status   02/11/2016 NEGATIVE NEG Final     WBC, UA   Date Value Ref Range Status   02/11/2016 0 TO 2 0 - 5 /HPF Final     Leukocyte Esterase, Urine   Date Value Ref Range Status   02/11/2016 NEGATIVE NEG Final     Comment:     Performed at [address] [address] [facility] [address], [address] [facility] [phone]         Imaging / Clinical Data :-   No results found. Clinical Course : gradually improving  Assessment and Plan  :        1. Bilateral lower extremity lymphedema with acute cellulitis -IV vancomycin, Zosyn. Elevation leg. Continue Bumex . Risk of abscess formation. 2. Lymphedema bilateral lower extremity-will benefit compression stocking, lymphedema clinic. Patient has appointment scheduled already as outpatient. 3. Extreme obesity s/p gastric banding. Patient plans to follow-up with. Except she has outpatient. 4. Essential hypertension-      Continue to monitor vitals , Intake / output ,  Cell count , HGB , Kidney function, oxygenation  as indicated . Plan and updates discussed with patient ,  answers  explained to satisfaction.    Plan discussed with Staff Mayelin Barrientos RN     (Please note that portions of this note were completed with a voice recognition program. Efforts were made to edit the dictations but occasionally words are mis-transcribed.)      Juan Carlos Haddad MD  8/14/2020

## 2020-08-14 NOTE — PLAN OF CARE
Problem: Falls - Risk of:  Goal: Will remain free from falls  Description: Will remain free from falls  8/14/2020 1057 by Michael Pena RN  Outcome: Ongoing  8/14/2020 0402 by Fany Platt RN  Outcome: Ongoing  Goal: Absence of physical injury  Description: Absence of physical injury  8/14/2020 1057 by Michael Pena RN  Outcome: Ongoing  8/14/2020 0402 by Fany Platt RN  Outcome: Ongoing     Problem: Discharge Planning:  Goal: Discharged to appropriate level of care  Description: Discharged to appropriate level of care  8/14/2020 1057 by Michael Pena RN  Outcome: Ongoing  8/14/2020 0402 by Fany Platt RN  Outcome: Ongoing     Problem:  Body Temperature - Imbalanced:  Goal: Ability to maintain a body temperature in the normal range will improve  Description: Ability to maintain a body temperature in the normal range will improve  8/14/2020 1057 by Michael Pena RN  Outcome: Ongoing  8/14/2020 0402 by Fany Platt RN  Outcome: Ongoing     Problem: Mobility - Impaired:  Goal: Mobility will improve to maximum level  Description: Mobility will improve to maximum level  8/14/2020 1057 by Michael Pena RN  Outcome: Ongoing  8/14/2020 0402 by Fany Platt RN  Outcome: Ongoing     Problem: Pain:  Goal: Pain level will decrease  Description: Pain level will decrease  8/14/2020 1057 by Michael Pena RN  Outcome: Ongoing  8/14/2020 0402 by Fany Platt RN  Outcome: Ongoing  Goal: Control of acute pain  Description: Control of acute pain  8/14/2020 1057 by Michael Pena RN  Outcome: Ongoing  8/14/2020 0402 by Fany Platt RN  Outcome: Ongoing  Goal: Control of chronic pain  Description: Control of chronic pain  8/14/2020 1057 by Michael Pena RN  Outcome: Ongoing  8/14/2020 0402 by Fany Platt RN  Outcome: Ongoing     Problem: Skin Integrity - Impaired:  Goal: Will show no infection signs and symptoms  Description: Will show no infection signs and symptoms  8/14/2020 1057 by

## 2020-08-14 NOTE — PROGRESS NOTES
Nutrition Assessment     Type and Reason for Visit: Initial, Positive Nutrition Screen, Wound    Nutrition Recommendations/Plan:   1. Continue general diet  2. Start Ovidio ONS 2x/day    Nutrition Assessment:  Patient admission related to cellulitis of anterior lower leg. No reports of decrease in appetite or weight PTA. Per protocol will start patient on wound healing supplement BID, patient aggreable to trying. Will monitor pt. tolerance of supplement and wound healing. Malnutrition Assessment:  Malnutrition Status: No malnutrition    Estimated Daily Nutrient Needs:  Energy (kcal): 2286-9367 kcal; Weight Used for Energy Requirements:  Admission     Protein (g): 109 g (2 g/kg IBW); Weight Used for Protein Requirements:  Ideal        Fluid (ml/day): 1 mL/kcal; Weight Used for Fluid Requirements:  Admission      Nutrition Related Findings: Edema: +4 pitting BLE; Wounds (serous drainage) BLE      Current Nutrition Therapies:    DIET GENERAL;     Anthropometric Measures:  · Height: 5' 4\" (162.6 cm)  · Current Body Wt: 414 lb 14.5 oz (188.2 kg)   · BMI: 71.2    Nutrition Diagnosis:   · Increased nutrient needs related to other (comment), increase demand for energy/nutrients(wound healing) as evidenced by wounds, localized or generalized fluid accumulation      Nutrition Interventions:   Food and/or Nutrient Delivery:  Continue Current Diet, Start Oral Nutrition Supplement  Nutrition Education/Counseling:  Education not indicated   Coordination of Nutrition Care:  Continued Inpatient Monitoring    Goals:  PO intake to meet > 75% energy and protien needs; wound healing       Nutrition Monitoring and Evaluation:   Food/Nutrient Intake Outcomes:  Food and Nutrient Intake, Supplement Intake  Physical Signs/Symptoms Outcomes:  Fluid Status or Edema, Skin, Weight     Discharge Planning:    Continue Oral Nutrition Supplement(Protein supplement)     Some areas of assessment may be incomplete due to COVID-19

## 2020-08-14 NOTE — CONSULTS
Pharmacy Note  Vancomycin Consult - Initial Note     Mare Murillo is a 51 y.o. female ordered Vancomycin. .  Current diagnosis for which MRSA is suspected/confirmed: Bilateral lower extremity cellulitis  Vancomycin order/consult received from Dr. Maxine Appiah. Additional antimicrobials:  Zosyn    Patient Active Problem List   Diagnosis    Lymphedema    Lower limb ulcer, thigh, right, with fat layer exposed ([address]    Chronic ulcer of thigh, left, with fat layer exposed ([address]    Cellulitis of anterior lower leg    Morbid obesity ([address]    Hypertension       Height:   162.6 cm  Wt Readings from Last 1 Encounters:   08/13/20 (!) 415 lb (188.2 kg)     Allergies:  Clindamycin/lincomycin; Estrogens; Fish-derived products; Glucophage [metformin hcl]; Ibuprofen; Iodine; Lisinopril; Medrol [methylprednisolone]; Metformin and related; Morphine; and Nsaids       No results found for: PROCAL  Recent Labs     08/13/20  1614   BUN 13     Recent Labs     08/13/20  1614   CREATININE 0.82     Recent Labs     08/13/20  1614   WBC 9.9     No intake or output data in the 24 hours ending 08/13/20 2140    Temp: 98.5 F    Culture Date / [facility]ie    /  Results  8/13/20             Blood          Pending  Actual Weight:    Wt Readings from Last 1 Encounters:   08/13/20 (!) 415 lb (188.2 kg)     CrCl based on IBW\"  72.5 mL/min      PLAN   Initial loading dose of 25mg/kg (max of 2500 mg) = 2500 mg   2. Vancomycin 1500 mg IV every 8 hours. 3.   Ensured BUN/sCr ordered at baseline and at least every 3rd day. 4.   ONLY for suspected pneumonia or COPD: MRSA nasal swab  is not ordered. Non respiratory infection. .    5. Trough ordered for: 8/14/20 20:00. Trough Goals (Non-dialysis patient) Peaks are not routinely recommended. 10-20 mcg/mL for mild skin/soft tissue infections or UTI.  15-20 mcg/mL is the target trough for all other indications that MRSA infection is suspected.     TROUGH TIMING: (Additional levels drawn based on renal function and/or clinical response). Dosing interval Timing of Trough   Every 8 hr, 12 hr, or 18 hr regimen Prior to the 4th dose  Twice weekly troughs for every 8 hour dosing   Every 24 hr regimen Prior to the 3rd or 4th dose   Every 36 hr regimen Prior to the 3rd dose           Thank you for the consult. Pharmacy will continue to follow.   Halie Pinto RPh  8/13/2020  9:40 PM

## 2020-08-14 NOTE — FLOWSHEET NOTE
Patient is awake and alert and is approachable. Patient engages in conversation and shares that she has family support. Patient denies any spiritual or emotional concerns. Writer provides listening presence and   conversation. Patient expresses appreciation for the visit. Spiritual Care will follow as needed.        08/14/20 1000   Encounter Summary   Services provided to: Patient   Referral/Consult From: [facility]   Continue Visiting   (8/14/20)   Complexity of Encounter Low   Length of Encounter 15 minutes   Routine   Type Initial   Assessment Approachable   Intervention Active listening;Explored feelings, thoughts, concerns;Explored coping resources;Nurtured hope   Outcome Expressed gratitude

## 2020-08-14 NOTE — PLAN OF CARE
Problem: Falls - Risk of:  Goal: Will remain free from falls  Description: Will remain free from falls  Outcome: Ongoing  Goal: Absence of physical injury  Description: Absence of physical injury  Outcome: Ongoing     Problem: Discharge Planning:  Goal: Discharged to appropriate level of care  Description: Discharged to appropriate level of care  Outcome: Ongoing     Problem:  Body Temperature - Imbalanced:  Goal: Ability to maintain a body temperature in the normal range will improve  Description: Ability to maintain a body temperature in the normal range will improve  Outcome: Ongoing     Problem: Mobility - Impaired:  Goal: Mobility will improve to maximum level  Description: Mobility will improve to maximum level  Outcome: Ongoing     Problem: Pain:  Goal: Pain level will decrease  Description: Pain level will decrease  Outcome: Ongoing  Goal: Control of acute pain  Description: Control of acute pain  Outcome: Ongoing  Goal: Control of chronic pain  Description: Control of chronic pain  Outcome: Ongoing     Problem: Skin Integrity - Impaired:  Goal: Will show no infection signs and symptoms  Description: Will show no infection signs and symptoms  Outcome: Ongoing  Goal: Absence of new skin breakdown  Description: Absence of new skin breakdown  Outcome: Ongoing     Problem: Pain:  Goal: Pain level will decrease  Description: Pain level will decrease  Outcome: Ongoing  Goal: Control of acute pain  Description: Control of acute pain  Outcome: Ongoing  Goal: Control of chronic pain  Description: Control of chronic pain  Outcome: Ongoing

## 2020-08-14 NOTE — PLAN OF CARE
Nutrition Problem #1: Increased nutrient needs  Intervention: Food and/or Nutrient Delivery: Continue Current Diet, Start Oral Nutrition Supplement  Nutritional Goals: PO intake to meet > 75% energy and protien needs; wound healing

## 2020-08-15 LAB
BUN BLDV-MCNC: 11 MG/DL (ref 6–20)
CREAT SERPL-MCNC: 1.1 MG/DL (ref 0.5–0.9)
GFR AFRICAN AMERICAN: >60 ML/MIN
GFR NON-AFRICAN AMERICAN: 53 ML/MIN
GFR SERPL CREATININE-BSD FRML MDRD: ABNORMAL ML/MIN/{1.73_M2}
GFR SERPL CREATININE-BSD FRML MDRD: ABNORMAL ML/MIN/{1.73_M2}

## 2020-08-15 PROCEDURE — 82565 ASSAY OF CREATININE: CPT

## 2020-08-15 PROCEDURE — 99232 SBSQ HOSP IP/OBS MODERATE 35: CPT | Performed by: FAMILY MEDICINE

## 2020-08-15 PROCEDURE — 2580000003 HC RX 258: Performed by: FAMILY MEDICINE

## 2020-08-15 PROCEDURE — 1200000000 HC SEMI PRIVATE

## 2020-08-15 PROCEDURE — 36415 COLL VENOUS BLD VENIPUNCTURE: CPT

## 2020-08-15 PROCEDURE — 6370000000 HC RX 637 (ALT 250 FOR IP): Performed by: NURSE PRACTITIONER

## 2020-08-15 PROCEDURE — 6360000002 HC RX W HCPCS: Performed by: FAMILY MEDICINE

## 2020-08-15 PROCEDURE — 84520 ASSAY OF UREA NITROGEN: CPT

## 2020-08-15 PROCEDURE — 97116 GAIT TRAINING THERAPY: CPT

## 2020-08-15 PROCEDURE — 6370000000 HC RX 637 (ALT 250 FOR IP): Performed by: FAMILY MEDICINE

## 2020-08-15 PROCEDURE — 97530 THERAPEUTIC ACTIVITIES: CPT

## 2020-08-15 RX ADMIN — OXYCODONE HYDROCHLORIDE AND ACETAMINOPHEN 1 TABLET: 10; 325 TABLET ORAL at 20:58

## 2020-08-15 RX ADMIN — ENOXAPARIN SODIUM 40 MG: 40 INJECTION SUBCUTANEOUS at 20:58

## 2020-08-15 RX ADMIN — VANCOMYCIN HYDROCHLORIDE 1500 MG: 1.5 INJECTION, POWDER, LYOPHILIZED, FOR SOLUTION INTRAVENOUS at 16:05

## 2020-08-15 RX ADMIN — VANCOMYCIN HYDROCHLORIDE 1500 MG: 1.5 INJECTION, POWDER, LYOPHILIZED, FOR SOLUTION INTRAVENOUS at 04:29

## 2020-08-15 RX ADMIN — ENOXAPARIN SODIUM 40 MG: 40 INJECTION SUBCUTANEOUS at 10:15

## 2020-08-15 RX ADMIN — OXYBUTYNIN CHLORIDE 15 MG: 5 TABLET, EXTENDED RELEASE ORAL at 16:04

## 2020-08-15 RX ADMIN — PIPERACILLIN AND TAZOBACTAM 3.38 G: 3; .375 INJECTION, POWDER, LYOPHILIZED, FOR SOLUTION INTRAVENOUS at 10:15

## 2020-08-15 RX ADMIN — OXYBUTYNIN CHLORIDE 15 MG: 5 TABLET, EXTENDED RELEASE ORAL at 10:15

## 2020-08-15 RX ADMIN — OXYCODONE HYDROCHLORIDE AND ACETAMINOPHEN 1 TABLET: 10; 325 TABLET ORAL at 10:15

## 2020-08-15 RX ADMIN — BUMETANIDE 1 MG: 1 TABLET ORAL at 17:17

## 2020-08-15 RX ADMIN — PIPERACILLIN AND TAZOBACTAM 3.38 G: 3; .375 INJECTION, POWDER, LYOPHILIZED, FOR SOLUTION INTRAVENOUS at 19:12

## 2020-08-15 RX ADMIN — BUMETANIDE 1 MG: 1 TABLET ORAL at 10:15

## 2020-08-15 RX ADMIN — OXYBUTYNIN CHLORIDE 15 MG: 5 TABLET, EXTENDED RELEASE ORAL at 20:58

## 2020-08-15 RX ADMIN — OXYCODONE HYDROCHLORIDE AND ACETAMINOPHEN 1 TABLET: 10; 325 TABLET ORAL at 15:59

## 2020-08-15 RX ADMIN — PIPERACILLIN AND TAZOBACTAM 3.38 G: 3; .375 INJECTION, POWDER, LYOPHILIZED, FOR SOLUTION INTRAVENOUS at 00:12

## 2020-08-15 ASSESSMENT — PAIN DESCRIPTION - PAIN TYPE
TYPE: ACUTE PAIN
TYPE: CHRONIC PAIN
TYPE: ACUTE PAIN;CHRONIC PAIN

## 2020-08-15 ASSESSMENT — PAIN DESCRIPTION - ONSET
ONSET: ON-GOING
ONSET: ON-GOING

## 2020-08-15 ASSESSMENT — PAIN DESCRIPTION - ORIENTATION
ORIENTATION: RIGHT;LEFT
ORIENTATION: RIGHT;LEFT
ORIENTATION: LEFT;RIGHT

## 2020-08-15 ASSESSMENT — PAIN SCALES - GENERAL
PAINLEVEL_OUTOF10: 0
PAINLEVEL_OUTOF10: 0
PAINLEVEL_OUTOF10: 6
PAINLEVEL_OUTOF10: 6
PAINLEVEL_OUTOF10: 7
PAINLEVEL_OUTOF10: 9
PAINLEVEL_OUTOF10: 0
PAINLEVEL_OUTOF10: 8
PAINLEVEL_OUTOF10: 7

## 2020-08-15 ASSESSMENT — ENCOUNTER SYMPTOMS
CONSTIPATION: 0
NAUSEA: 0
VOMITING: 0
WHEEZING: 0
COLOR CHANGE: 1
RHINORRHEA: 0
BLOOD IN STOOL: 0
COUGH: 0
ABDOMINAL PAIN: 0
DIARRHEA: 0
CHEST TIGHTNESS: 0
SHORTNESS OF BREATH: 0

## 2020-08-15 ASSESSMENT — PAIN - FUNCTIONAL ASSESSMENT
PAIN_FUNCTIONAL_ASSESSMENT: PREVENTS OR INTERFERES SOME ACTIVE ACTIVITIES AND ADLS
PAIN_FUNCTIONAL_ASSESSMENT: ACTIVITIES ARE NOT PREVENTED

## 2020-08-15 ASSESSMENT — PAIN DESCRIPTION - DESCRIPTORS
DESCRIPTORS: BURNING
DESCRIPTORS: ACHING

## 2020-08-15 ASSESSMENT — PAIN DESCRIPTION - LOCATION
LOCATION: LEG

## 2020-08-15 ASSESSMENT — PAIN DESCRIPTION - PROGRESSION
CLINICAL_PROGRESSION: GRADUALLY WORSENING
CLINICAL_PROGRESSION: NOT CHANGED

## 2020-08-15 ASSESSMENT — PAIN DESCRIPTION - FREQUENCY
FREQUENCY: CONTINUOUS
FREQUENCY: INTERMITTENT

## 2020-08-15 NOTE — PROGRESS NOTES
Pharmacy Note  Vancomycin Consult - Follow Up     Vancomycin Therapy Day: 2  Current Dosing: Vancomycin 1500mg IV q 8 hours  Current diagnosis for which MRSA is suspected/confirmed: cellulitis  ONLY for suspected pneumonia or COPD: MRSA nasal swab   N/A: Non respiratory infection. .      Temp: 99    Actual Weight:   Wt Readings from Last 1 Encounters:   08/14/20 (!) 415 lb (188.2 kg)       Recent Labs     08/13/20  1614 08/14/20  0450   CREATININE 0.82 1.00*     Calculate CrCl based on IBW: 60 ml/min    Recent Labs     08/13/20  1614 08/14/20  0450   WBC 9.9 8.9         Intake/Output Summary (Last 24 hours) at 8/14/2020 2204  Last data filed at 8/14/2020 2146  Gross per 24 hour   Intake 50 ml   Output 1375 ml   Net -1325 ml           ASSESSMENT/PLAN    Vancomycin trough drawn this PM prior to the third dose was slightly elevated at 24.6 ug/ml. To achieve our goal of a trough of 10 - 20 ug/ml, we will change from 1500mg IV q 8 hours to 1500mg IV q 12 hours to allow the trough to fall into the desired range. We will repeat a trough at our new steady state, 8/15 @ 1300. Thank you for the consult. Will Continue to follow.   Lex Lawrence RPh  8/14/2020  10:04 PM

## 2020-08-15 NOTE — PROGRESS NOTES
Physical Therapy  Facility/Department: [facility]  Daily Treatment Note  NAME: Jairo Arroyo  : 1970  MRN: [id number]    Date of Service: 8/15/2020    Discharge Recommendations:  Home independently        Assessment   Body structures, Functions, Activity limitations: Decreased functional mobility ; Decreased endurance  Assessment: Patient able to ambulate with her cane today 180 ft with only moderate shortness of breath, SBA. Needs stair training to prepare for DC home. Prognosis: Good  Decision Making: Low Complexity  Clinical Presentation: stable  PT Education: Goals;Plan of Care;Precautions;Transfer Training;General Safety;Gait Training  REQUIRES PT FOLLOW UP: Yes  Activity Tolerance  Activity Tolerance: Patient limited by fatigue     Patient Diagnosis(es): The encounter diagnosis was Cellulitis of lower extremity, unspecified laterality. has a past medical history of Anxiety, Asthma, Cellulitis, Headache(784.0), Hypertension, and Lymphedema. has a past surgical history that includes Gastric Band. Restrictions  Restrictions/Precautions  Restrictions/Precautions: (Up with assist, up as tolerated)  Subjective   General  Chart Reviewed: Yes  Family / Caregiver Present: No  Subjective  Subjective: Pt said she took a pain med recently and feels pretty good to get out of bed but is sleepy today. Pain Screening  Patient Currently in Pain: Yes  Pain Assessment  Pain Level: 6  Pain Type: Chronic pain  Pain Location: Leg  Pain Orientation: Right;Left  Vital Signs  Patient Currently in Pain: Yes       Orientation  Orientation  Overall Orientation Status: Within Normal Limits  Cognition      Objective   Bed mobility  Rolling to Left: Supervision  Rolling to Right: Supervision  Supine to Sit: Stand by assistance  Sit to Supine: Maximum assistance(bilateral LEs)  Comment: After returning pt to bed and pt being settled into bed, pt remarked how her arm IV was coming out.  Checked pt's IV site and blood was coming from it, called nursing to attend IV issue. Nursing arrived and proceeded to take care of IV situation. During therapy, there had been no issue with the IV. Transfers  Sit to Stand: Stand by assistance  Stand to sit: Stand by assistance  Squat Pivot Transfers: Stand by assistance  Lateral Transfers: Stand by assistance  Ambulation  Ambulation?: Yes  Ambulation 1  Surface: level tile  Device: Single point cane  Assistance: Stand by assistance  Quality of Gait: Wide lateral weight shifts due to her morbid obesity  Gait Deviations: Increased ILA  Distance: 180'                  Other Activities: Other (see comment)  Comment: Productive commode transfer SBA, Min assist with setup              G-Code     OutComes Score                                                     AM-PAC Score             Goals  Short term goals  Time Frame for Short term goals: 10 visits  Short term goal 1: Ambulate 200' with straight cane with modified independence. Short term goal 2: Negotiate up and down the equivalent of 1 flight of steps, CGA. Patient Goals   Patient goals : None    Plan    Plan  Times per week: 1-2x/day, 5-6x/week  Current Treatment Recommendations: Endurance Training, Patient/Caregiver Education & Training, Functional Mobility Training, Transfer Training, Gait Training, Stair training  Safety Devices  Type of devices:  All fall risk precautions in place, Call light within reach, Gait belt, Left in bed, Nurse notified(left pt with nursing)     Therapy Time   Individual Concurrent Group Co-treatment   Time In 1029         Time Out 1115         Minutes 55                 Lisbet Rodgers, PTA

## 2020-08-15 NOTE — PROGRESS NOTES
[facility] [place] - INPATIENT      Daily Progress Note     Admit Date: 8/13/2020  Bed/Room No.  2005/2005-01  Admitting Physician : Victor Manuel Farris MD  Code Status :[address] Day:  LOS: 2 days   Chief Complaint:     Chief Complaint   Patient presents with    Cellulitis     L leg     Principal Problem:    Cellulitis of anterior lower leg  Active Problems:    Lymphedema    Morbid obesity ([address]  Resolved Problems:    * No resolved hospital problems. *    Subjective : Interval History/Significant events :  08/15/20    Patient has no improvement in swelling, redness, pain, edema of bilateral lower extremity. She remains afebrile. Patient has good appetite, denies nausea, vomiting. She is complaining of cramping in legs. She reported improvement in leg swelling and edema with Ace compression but was complaining of cramps and removed compression this morning. Vitals - Stable afebrile  Labs -elevated creatinine. Nursing notes , Consults notes reviewed. Overnight events and updates discussed with Nursing staff . Background History:         Eusebia Peterson is 51 y.o. female  Who was admitted to the hospital on 8/13/2020 for treatment of Cellulitis of anterior lower leg. Patient was brought to emergency room by her sister for bilateral lower extremity Swelling, redness, pain. Patient has known history of lymphedema, morbid obesity. She has been struggling with lower extremity swelling for last 2 to 3 months. She is homebound and has been visited by her PCP for home visits. She was treated with multiple courses of antibiotics and last 2 months. Patient also has scheduled appointment with lymphedema clinic. She noted redness and left leg around the knee and inner thigh associated with pain. Patient also reported some drainage in last week. Patient also complains of swelling and pain in right inner thigh. She denies any fevers but has been having chills.   Evaluation emergency room showed temperature 98.5, blood pressure 152/109, BMI 71.4 lab evaluation showed normal electrolytes, kidney function. Patient reports that she has difficulty in ambulation due to hip fracture and knee arthritis. She is on long-term diuretics Bumex and Lasix. Patient had gastric banding before. Patient was started on empiric IV vancomycin, Zosyn. PMH:  Past Medical History:   Diagnosis Date    Anxiety     Asthma     Cellulitis     Headache(784.0)     Hypertension     Lymphedema       Allergies: Allergies   Allergen Reactions    Clindamycin/Lincomycin     Estrogens     Fish-Derived Products     Glucophage [Metformin Hcl]     Ibuprofen     Iodine     Lisinopril     Medrol [Methylprednisolone]      itching    Metformin And Related     Morphine Itching    Nsaids      Pt has had a gastric banding      Medications :  bumetanide, 1 mg, Oral, BID  vancomycin, 1,500 mg, Intravenous, Q12H  sodium chloride flush, 10 mL, Intravenous, 2 times per day  enoxaparin, 40 mg, Subcutaneous, BID  piperacillin-tazobactam, 3.375 g, Intravenous, Q8H  vancomycin (VANCOCIN) intermittent dosing (placeholder), , Other, RX Placeholder  oxybutynin, 15 mg, Oral, TID        Review of Systems   Review of Systems   Constitutional: Negative for appetite change, fatigue, fever and unexpected weight change. HENT: Negative for congestion, rhinorrhea and sneezing. Eyes: Negative for visual disturbance. Respiratory: Negative for cough, chest tightness, shortness of breath and wheezing. Cardiovascular: Positive for leg swelling. Negative for chest pain and palpitations. Gastrointestinal: Negative for abdominal pain, blood in stool, constipation, diarrhea, nausea and vomiting. Genitourinary: Negative for dysuria, enuresis, frequency and hematuria. Musculoskeletal: Negative for arthralgias and myalgias. Skin: Positive for color change and wound. Negative for rash.    Neurological: Negative for dizziness, weakness, light-headedness and headaches. Hematological: Does not bruise/bleed easily. Psychiatric/Behavioral: Negative for dysphoric mood and sleep disturbance. Objective :      Current Vitals : Temp: 98 °F (36.7 °C),  Pulse: 104, Resp: 18, BP: 137/87, SpO2: 100 %  Last 24 Hrs Vitals   Patient Vitals for the past 24 hrs:   BP Temp Temp src Pulse Resp SpO2 Weight   08/15/20 0834 137/87 98 °F (36.7 °C) Oral 104 18 100 % --   08/15/20 0311 -- -- -- -- -- -- (!) 415 lb 3.2 oz (188.3 kg)   08/15/20 0015 122/61 -- -- 93 -- -- --   08/14/20 1932 (!) 150/106 99 °F (37.2 °C) Oral 99 18 99 % --   08/14/20 1505 -- -- -- 126 -- -- (!) 415 lb (188.2 kg)     Intake / output   08/14 0701 - 08/15 0700  In: 405 [I.V.:55]  Out: 1025 [Urine:1025]  Physical Exam:  Physical Exam  Vitals signs and nursing note reviewed. Constitutional:       General: She is not in acute distress. Appearance: She is not diaphoretic. HENT:      Head: Normocephalic and atraumatic. Nose:      Right Sinus: No maxillary sinus tenderness or frontal sinus tenderness. Left Sinus: No maxillary sinus tenderness or frontal sinus tenderness. Mouth/Throat:      Pharynx: No oropharyngeal exudate. Eyes:      General: No scleral icterus. Conjunctiva/sclera: Conjunctivae normal.      Pupils: Pupils are equal, round, and reactive to light. Neck:      Musculoskeletal: Full passive range of motion without pain and neck supple. Thyroid: No thyromegaly. Vascular: No JVD. Cardiovascular:      Rate and Rhythm: Normal rate and regular rhythm. Pulses:           Dorsalis pedis pulses are 2+ on the right side and 2+ on the left side. Heart sounds: Normal heart sounds. No murmur. Pulmonary:      Effort: Pulmonary effort is normal.      Breath sounds: Normal breath sounds. No wheezing or rales. Abdominal:      Palpations: Abdomen is soft. There is no mass. Tenderness: There is no abdominal tenderness.    Musculoskeletal: Clinical Course : gradually improving  Assessment and Plan  :        1. Bilateral lower extremity lymphedema with acute cellulitis -continue IV vancomycin, Zosyn. Elevation leg. Continue Bumex . Risk of abscess formation. Consult ID. Monitor kidney function  2. Lymphedema bilateral lower extremity-continue compression stocking, lymphedema clinic. Patient has appointment scheduled already as outpatient in September. 3. Extreme obesity s/p gastric banding. Patient plans to follow-up with. Except she has outpatient. 4. Essential hypertension-      Continue to monitor vitals , Intake / output ,  Cell count , HGB , Kidney function, oxygenation  as indicated . Plan and updates discussed with patient ,  answers  explained to satisfaction.    Plan discussed with Staff Mayelin Barrientos RN     (Please note that portions of this note were completed with a voice recognition program. Efforts were made to edit the dictations but occasionally words are mis-transcribed.)      Juan Carlos Haddad MD  8/15/2020

## 2020-08-16 LAB
ANION GAP SERPL CALCULATED.3IONS-SCNC: 12 MMOL/L (ref 9–17)
BUN BLDV-MCNC: 9 MG/DL (ref 6–20)
BUN/CREAT BLD: 8 (ref 9–20)
CALCIUM SERPL-MCNC: 8.4 MG/DL (ref 8.6–10.4)
CHLORIDE BLD-SCNC: 102 MMOL/L (ref 98–107)
CO2: 27 MMOL/L (ref 20–31)
CREAT SERPL-MCNC: 1.11 MG/DL (ref 0.5–0.9)
GFR AFRICAN AMERICAN: >60 ML/MIN
GFR NON-AFRICAN AMERICAN: 52 ML/MIN
GFR SERPL CREATININE-BSD FRML MDRD: ABNORMAL ML/MIN/{1.73_M2}
GFR SERPL CREATININE-BSD FRML MDRD: ABNORMAL ML/MIN/{1.73_M2}
GLUCOSE BLD-MCNC: 102 MG/DL (ref 70–99)
POTASSIUM SERPL-SCNC: 3.2 MMOL/L (ref 3.7–5.3)
SODIUM BLD-SCNC: 141 MMOL/L (ref 135–144)
VANCOMYCIN TROUGH DATE LAST DOSE: ABNORMAL
VANCOMYCIN TROUGH DOSE AMOUNT: ABNORMAL
VANCOMYCIN TROUGH TIME LAST DOSE: ABNORMAL
VANCOMYCIN TROUGH: 29.8 UG/ML (ref 10–20)

## 2020-08-16 PROCEDURE — 1200000000 HC SEMI PRIVATE

## 2020-08-16 PROCEDURE — 97166 OT EVAL MOD COMPLEX 45 MIN: CPT

## 2020-08-16 PROCEDURE — 6370000000 HC RX 637 (ALT 250 FOR IP): Performed by: FAMILY MEDICINE

## 2020-08-16 PROCEDURE — 6360000002 HC RX W HCPCS: Performed by: FAMILY MEDICINE

## 2020-08-16 PROCEDURE — 36415 COLL VENOUS BLD VENIPUNCTURE: CPT

## 2020-08-16 PROCEDURE — 80202 ASSAY OF VANCOMYCIN: CPT

## 2020-08-16 PROCEDURE — 2580000003 HC RX 258: Performed by: FAMILY MEDICINE

## 2020-08-16 PROCEDURE — 6370000000 HC RX 637 (ALT 250 FOR IP): Performed by: NURSE PRACTITIONER

## 2020-08-16 PROCEDURE — 6360000002 HC RX W HCPCS: Performed by: INTERNAL MEDICINE

## 2020-08-16 PROCEDURE — 80048 BASIC METABOLIC PNL TOTAL CA: CPT

## 2020-08-16 PROCEDURE — 2580000003 HC RX 258: Performed by: INTERNAL MEDICINE

## 2020-08-16 PROCEDURE — 99253 IP/OBS CNSLTJ NEW/EST LOW 45: CPT | Performed by: INTERNAL MEDICINE

## 2020-08-16 PROCEDURE — 99232 SBSQ HOSP IP/OBS MODERATE 35: CPT | Performed by: FAMILY MEDICINE

## 2020-08-16 PROCEDURE — 97535 SELF CARE MNGMENT TRAINING: CPT

## 2020-08-16 RX ADMIN — OXYBUTYNIN CHLORIDE 15 MG: 5 TABLET, EXTENDED RELEASE ORAL at 21:42

## 2020-08-16 RX ADMIN — OXYCODONE HYDROCHLORIDE AND ACETAMINOPHEN 1 TABLET: 10; 325 TABLET ORAL at 01:39

## 2020-08-16 RX ADMIN — PIPERACILLIN AND TAZOBACTAM 3.38 G: 3; .375 INJECTION, POWDER, LYOPHILIZED, FOR SOLUTION INTRAVENOUS at 09:50

## 2020-08-16 RX ADMIN — OXYCODONE HYDROCHLORIDE AND ACETAMINOPHEN 1 TABLET: 10; 325 TABLET ORAL at 21:41

## 2020-08-16 RX ADMIN — ENOXAPARIN SODIUM 40 MG: 40 INJECTION SUBCUTANEOUS at 21:42

## 2020-08-16 RX ADMIN — ENOXAPARIN SODIUM 40 MG: 40 INJECTION SUBCUTANEOUS at 09:50

## 2020-08-16 RX ADMIN — PIPERACILLIN AND TAZOBACTAM 3.38 G: 3; .375 INJECTION, POWDER, LYOPHILIZED, FOR SOLUTION INTRAVENOUS at 01:40

## 2020-08-16 RX ADMIN — OXYBUTYNIN CHLORIDE 15 MG: 5 TABLET, EXTENDED RELEASE ORAL at 10:07

## 2020-08-16 RX ADMIN — OXYBUTYNIN CHLORIDE 15 MG: 5 TABLET, EXTENDED RELEASE ORAL at 13:32

## 2020-08-16 RX ADMIN — BUMETANIDE 1 MG: 1 TABLET ORAL at 09:49

## 2020-08-16 RX ADMIN — CEFEPIME HYDROCHLORIDE 2 G: 2 INJECTION, POWDER, FOR SOLUTION INTRAVENOUS at 13:32

## 2020-08-16 RX ADMIN — OXYCODONE HYDROCHLORIDE AND ACETAMINOPHEN 1 TABLET: 10; 325 TABLET ORAL at 05:31

## 2020-08-16 RX ADMIN — OXYCODONE HYDROCHLORIDE AND ACETAMINOPHEN 1 TABLET: 10; 325 TABLET ORAL at 13:32

## 2020-08-16 RX ADMIN — VANCOMYCIN HYDROCHLORIDE 1500 MG: 1.5 INJECTION, POWDER, LYOPHILIZED, FOR SOLUTION INTRAVENOUS at 05:31

## 2020-08-16 RX ADMIN — VANCOMYCIN HYDROCHLORIDE 1250 MG: 1.25 INJECTION, POWDER, LYOPHILIZED, FOR SOLUTION INTRAVENOUS at 21:47

## 2020-08-16 RX ADMIN — BUMETANIDE 1 MG: 1 TABLET ORAL at 17:19

## 2020-08-16 ASSESSMENT — PAIN SCALES - GENERAL
PAINLEVEL_OUTOF10: 9
PAINLEVEL_OUTOF10: 9
PAINLEVEL_OUTOF10: 3
PAINLEVEL_OUTOF10: 8
PAINLEVEL_OUTOF10: 0
PAINLEVEL_OUTOF10: 9

## 2020-08-16 ASSESSMENT — PAIN DESCRIPTION - PROGRESSION
CLINICAL_PROGRESSION: NOT CHANGED
CLINICAL_PROGRESSION: NOT CHANGED

## 2020-08-16 ASSESSMENT — PAIN DESCRIPTION - ONSET
ONSET: ON-GOING
ONSET: ON-GOING

## 2020-08-16 ASSESSMENT — ENCOUNTER SYMPTOMS
CHEST TIGHTNESS: 0
BLOOD IN STOOL: 0
COLOR CHANGE: 1
WHEEZING: 0
VOMITING: 0
CONSTIPATION: 0
DIARRHEA: 0
COUGH: 0
RHINORRHEA: 0
ABDOMINAL PAIN: 0
SHORTNESS OF BREATH: 0
NAUSEA: 0

## 2020-08-16 ASSESSMENT — PAIN DESCRIPTION - ORIENTATION
ORIENTATION: RIGHT;LEFT
ORIENTATION: RIGHT;LEFT

## 2020-08-16 ASSESSMENT — PAIN - FUNCTIONAL ASSESSMENT
PAIN_FUNCTIONAL_ASSESSMENT: PREVENTS OR INTERFERES SOME ACTIVE ACTIVITIES AND ADLS
PAIN_FUNCTIONAL_ASSESSMENT: PREVENTS OR INTERFERES SOME ACTIVE ACTIVITIES AND ADLS

## 2020-08-16 ASSESSMENT — PAIN DESCRIPTION - PAIN TYPE
TYPE: ACUTE PAIN;CHRONIC PAIN
TYPE: ACUTE PAIN;CHRONIC PAIN

## 2020-08-16 ASSESSMENT — PAIN DESCRIPTION - FREQUENCY
FREQUENCY: CONTINUOUS
FREQUENCY: CONTINUOUS

## 2020-08-16 ASSESSMENT — PAIN DESCRIPTION - DESCRIPTORS
DESCRIPTORS: BURNING
DESCRIPTORS: BURNING

## 2020-08-16 ASSESSMENT — PAIN DESCRIPTION - LOCATION
LOCATION: LEG
LOCATION: LEG

## 2020-08-16 NOTE — PROGRESS NOTES
fracture and knee arthritis. She is on long-term diuretics Bumex and Lasix. Patient had gastric banding before. Patient was started on empiric IV vancomycin, Zosyn. PMH:  Past Medical History:   Diagnosis Date    Anxiety     Asthma     Cellulitis     Headache(784.0)     Hypertension     Lymphedema       Allergies: Allergies   Allergen Reactions    Clindamycin/Lincomycin     Estrogens     Fish-Derived Products     Glucophage [Metformin Hcl]     Ibuprofen     Iodine     Lisinopril     Medrol [Methylprednisolone]      itching    Metformin And Related     Morphine Itching    Nsaids      Pt has had a gastric banding      Medications :  bumetanide, 1 mg, Oral, BID  vancomycin, 1,500 mg, Intravenous, Q12H  sodium chloride flush, 10 mL, Intravenous, 2 times per day  enoxaparin, 40 mg, Subcutaneous, BID  piperacillin-tazobactam, 3.375 g, Intravenous, Q8H  vancomycin (VANCOCIN) intermittent dosing (placeholder), , Other, RX Placeholder  oxybutynin, 15 mg, Oral, TID        Review of Systems   Review of Systems   Constitutional: Negative for appetite change, fatigue, fever and unexpected weight change. HENT: Negative for congestion, rhinorrhea and sneezing. Eyes: Negative for visual disturbance. Respiratory: Negative for cough, chest tightness, shortness of breath and wheezing. Cardiovascular: Positive for leg swelling. Negative for chest pain and palpitations. Gastrointestinal: Negative for abdominal pain, blood in stool, constipation, diarrhea, nausea and vomiting. Genitourinary: Negative for dysuria, enuresis, frequency and hematuria. Musculoskeletal: Negative for arthralgias and myalgias. Skin: Positive for color change and wound. Negative for rash. Neurological: Negative for dizziness, weakness, light-headedness and headaches. Hematological: Does not bruise/bleed easily. Psychiatric/Behavioral: Negative for dysphoric mood and sleep disturbance.      Objective :      Current Vitals : Temp: 98.8 °F (37.1 °C),  Pulse: 97, Resp: 16, BP: 138/76, SpO2: 97 %  Last 24 Hrs Vitals   Patient Vitals for the past 24 hrs:   BP Temp Temp src Pulse Resp SpO2   08/16/20 0751 138/76 98.8 °F (37.1 °C) Oral 97 16 97 %   08/16/20 0339 (!) 112/57 99.1 °F (37.3 °C) Oral 97 18 93 %   08/16/20 0003 136/71 99.1 °F (37.3 °C) Oral 114 18 95 %   08/15/20 2100 -- -- -- 90 -- --   08/15/20 1927 (!) 149/82 99 °F (37.2 °C) Oral 102 18 100 %   08/15/20 1710 127/81 98.8 °F (37.1 °C) Oral 101 18 100 %   08/15/20 0834 137/87 98 °F (36.7 °C) Oral 104 18 100 %     Intake / output   08/15 0701 - 08/16 0700  In: 800 [P.O.:800]  Out: 2600 [Urine:2600]  Physical Exam:  Physical Exam  Vitals signs and nursing note reviewed. Constitutional:       General: She is not in acute distress. Appearance: She is not diaphoretic. HENT:      Head: Normocephalic and atraumatic. Nose:      Right Sinus: No maxillary sinus tenderness or frontal sinus tenderness. Left Sinus: No maxillary sinus tenderness or frontal sinus tenderness. Mouth/Throat:      Pharynx: No oropharyngeal exudate. Eyes:      General: No scleral icterus. Conjunctiva/sclera: Conjunctivae normal.      Pupils: Pupils are equal, round, and reactive to light. Neck:      Musculoskeletal: Full passive range of motion without pain and neck supple. Thyroid: No thyromegaly. Vascular: No JVD. Cardiovascular:      Rate and Rhythm: Normal rate and regular rhythm. Pulses:           Dorsalis pedis pulses are 2+ on the right side and 2+ on the left side. Heart sounds: Normal heart sounds. No murmur. Pulmonary:      Effort: Pulmonary effort is normal.      Breath sounds: Normal breath sounds. No wheezing or rales. Abdominal:      Palpations: Abdomen is soft. There is no mass. Tenderness: There is no abdominal tenderness.    Musculoskeletal:      Comments: Bilateral lower extremity swelling, pitting edema, induration, swelling lower thigh with erythema, tender to touch. Lymphadenopathy:      Head:      Right side of head: No submandibular adenopathy. Left side of head: No submandibular adenopathy. Cervical: No cervical adenopathy. Skin:     General: Skin is warm. Neurological:      Mental Status: She is alert and oriented to person, place, and time. Motor: No tremor. Psychiatric:         Behavior: Behavior is cooperative. Laboratory findings:    Recent Labs     08/13/20  1614 08/14/20  0450   WBC 9.9 8.9   HGB 11.6* 11.6*   HCT 36.6 37.1    236   INR  --  1.1     Recent Labs     08/13/20  1614 08/14/20  0450 08/15/20  0532 08/16/20  0550    139  --  141   K 3.7 3.5*  --  3.2*    100  --  102   CO2 25 28  --  27   GLUCOSE 99 93  --  102*   BUN 13 11 11 9   CREATININE 0.82 1.00* 1.10* 1.11*   MG  --  1.9  --   --    CALCIUM 9.0 8.8  --  8.4*   CAION  --  1.19  --   --    PHOS  --  3.2  --   --      Recent Labs     08/13/20  1614 08/14/20  0450   PROT 6.8 7.1   LABALBU 3.5 3.6   AST 11 11   ALT 11 9   ALKPHOS 56 59   BILITOT 0.64 0.81          Specific Gravity, UA   Date Value Ref Range Status   02/11/2016 1.010 1.005 - 1.030 Final     Protein, UA   Date Value Ref Range Status   02/11/2016 1+ (A) NEG Final     RBC, UA   Date Value Ref Range Status   02/11/2016 None 0 - 2 /HPF Final     Bacteria, UA   Date Value Ref Range Status   02/11/2016 FEW (A) NONE Final     Comment:     Performed at [facility] [address], [address] [facility] [phone]       Nitrite, Urine   Date Value Ref Range Status   02/11/2016 NEGATIVE NEG Final     WBC, UA   Date Value Ref Range Status   02/11/2016 0 TO 2 0 - 5 /HPF Final     Leukocyte Esterase, Urine   Date Value Ref Range Status   02/11/2016 NEGATIVE NEG Final     Comment:     Performed at [facility] [address], [address] [facility] [phone]         Imaging / Clinical Data :-   No results found.      Clinical Course :

## 2020-08-16 NOTE — FLOWSHEET NOTE
Vancomycin trough result 29.8, call made to inpatient pharmacy. Rn spoke with Latricia Navarro in pharmacy regarding Vancomycin trough result and next Vancomycin dose being scheduled for 1600. Pharmacy stated the 1600 dose of Vancomycin would get moved back on the [facility] and a new Vancomycin trough scheduled for 8/17/2020.

## 2020-08-16 NOTE — PLAN OF CARE
Ongoing     Problem: Skin Integrity:  Goal: Absence of new skin breakdown  Description: Absence of new skin breakdown  Outcome: Ongoing

## 2020-08-16 NOTE — PROGRESS NOTES
Occupational Therapy   Occupational Therapy Initial Assessment  Date: 2020   Patient Name: Tavo Disla  MRN: [id number]     : 1970    Date of Service: 2020    Discharge Recommendations:  Home with assist PRN     RN reports patient is medically stable for therapy treatment this date. Chart reviewed prior to treatment and patient is agreeable for therapy. All lines intact and patient positioned comfortably at end of treatment. All patient needs addressed prior to ending therapy session. Per Chart: 76-year-old female presents to the emergency room with swelling pain and erythema to the upper thighs. Patient suffers from lymphedema and has been dealing with issues with cellulitis in the upper thighs since  of this year. She has been on several different antibiotics most recently doxycycline which she is currently taking. Patient states that she continues to have swelling and warmth with progression of her symptoms. Patient denies any fever at home. She has had some mild nausea intermittently over the last few days. She has also noticed an increase in her blood pressure over the last several days. Assessment   Performance deficits / Impairments: Decreased functional mobility ; Decreased strength;Decreased endurance;Decreased ADL status; Decreased sensation  Prognosis: Fair  Decision Making: Medium Complexity  OT Education: OT Role;Plan of Care;Energy Conservation  Patient Education: Pt was educated on pacing, taking breaks when needed, and resting before feeling fatigued. Pt verb'd understanding  No Skilled OT: At baseline function  REQUIRES OT FOLLOW UP: No  Activity Tolerance  Activity Tolerance: Patient Tolerated treatment well  Safety Devices  Safety Devices in place: Yes  Type of devices: Left in bed;Gait belt;Call light within reach           Patient Diagnosis(es): The encounter diagnosis was Cellulitis of lower extremity, unspecified laterality.      has a past medical history of Anxiety, Asthma, Cellulitis, Headache(784.0), Hypertension, and Lymphedema. has a past surgical history that includes Gastric Band. Restrictions  Restrictions/Precautions  Restrictions/Precautions: General Precautions  Position Activity Restriction  Other position/activity restrictions: Up with assist    Subjective   General  Chart Reviewed: Yes  Patient assessed for rehabilitation services?: Yes  Family / Caregiver Present: No    Social/Functional History  Social/Functional History  Lives With: Friend(s)  Type of Home: House  Home Layout: Two level  Home Access: Stairs to enter without rails  Entrance Stairs - Number of Steps: 2-3  Bathroom Shower/Tub: Tub/Shower unit  Bathroom Toilet: Standard  Home Equipment: [employer]  ADL Assistance: Independent  Homemaking Assistance: Independent  Ambulation Assistance: Independent  Transfer Assistance: Independent  Active : Yes  Occupation: Full time employment  Type of occupation: Works for [employer], mostly from home at current  Additional Comments: Uses a motorized cart for grocery shopping       Objective   Vision: Within Functional Limits  Hearing: Within functional limits    Orientation  Overall Orientation Status: Within Functional Limits     Balance  Sitting Balance: Independent  Standing Balance: Stand by assistance  Functional Mobility  Functional - Mobility Device: Cane(single point cane)  Activity: To/from bathroom  Assist Level: Stand by assistance  Functional Mobility Comments: Pt walks slowly and req'd increased time for mobility. Pt req'd assist to manage IV  ADL  Feeding: Independent;Setup  Grooming: Independent;Setup  UE Bathing: Modified independent ;Setup  LE Bathing: Modified independent ;Setup  UE Dressing: Modified independent ;Setup  LE Dressing: Modified independent ;Setup  Toileting: Modified independent ; Independent  Additional Comments: Pt may need to perform some ADLs seated d/t weight and edema in B LE  Tone RUE  RUE Tone: Normotonic  Tone LUE  LUE Tone: Normotonic     Bed mobility  Rolling to Left: Supervision  Supine to Sit: Supervision  Comment: Pt req'd increased time to move from supine to sit. Pt picked up LE at times to assist in walking legs to EOB. Pt ended session seated EOB  Transfers  Sit to stand: Stand by assistance  Stand to sit: Stand by assistance  Transfer Comments: Pt pushed off of bed and rocked forward and backward a couple of times to gain momentum before standing.  Pt reached back for bed when sitting and req'd increased time for transfers     Cognition  Overall Cognitive Status: WFL  Perception  Overall Perceptual Status: WFL     Sensation  Overall Sensation Status: Impaired(occasional numbness back of L thigh)        LUE AROM (degrees)  LUE AROM : WFL  RUE AROM (degrees)  RUE AROM : WFL  LUE Strength  Gross LUE Strength: WFL  LUE Strength Comment: B UE 4/5  RUE Strength  Gross RUE Strength: WFL                   Plan   Plan  Times per week: 1 visit with eval  Current Treatment Recommendations: Functional Mobility Training, Endurance Training, Self-Care / ADL                 Goals  Short term goals  Time Frame for Short term goals: 1 visit with eval  Short term goal 1: demo and verb good understanding of education provided on EC/WS techs  Patient Goals   Patient goals : not stated       Therapy Time   Individual Concurrent Group Co-treatment   Time In 0910         Time Out 0942         Minutes Baron La

## 2020-08-16 NOTE — FLOWSHEET NOTE
Pt called out requesting bilateral lower extremity ace wraps to be loosened. RN entered pt room and pt had removed LLE ace wrap completely. Pt requested RN remove the RLE ace wrap as well as they were \"too tight and itchy\". Informed pt of the importance of the ace wraps and that they were helping with the edema to her lower extremities. Pt refusing RN to place the ace wraps back on looser at this time.

## 2020-08-16 NOTE — PROGRESS NOTES
Pharmacy Note  Vancomycin Consult - Follow Up     Vancomycin Therapy Day: 4  Current Dosinmg q12h  Current diagnosis for which MRSA is suspected/confirmed: Cellulitis  ONLY for suspected pneumonia or COPD: MRSA nasal swab   N/A: Non respiratory infection. .      Temp: 98.8F    Actual Weight:   Wt Readings from Last 1 Encounters:   08/15/20 (!) 415 lb 3.2 oz (188.3 kg)       Recent Labs     08/15/20  0532 20  0550   CREATININE 1.10* 1.11*     Calculate CrCl based on IBW: 54 mL/min    Recent Labs     20  1614 20  0450   WBC 9.9 8.9         Intake/Output Summary (Last 24 hours) at 2020 1556  Last data filed at 2020 1230  Gross per 24 hour   Intake 1160 ml   Output 3100 ml   Net -1940 ml           ASSESSMENT/PLAN    Trough is only 9.5 hrs after last dose. It is still too high even if not a true trough. Will decrease to 1250mg q12hr  Next dose to be delayed. Will start at [facility] to be drawn  @ 1900      Thank you for the consult. Will Continue to follow.   Katie Singh, [address]  2020  3:56 PM

## 2020-08-16 NOTE — CONSULTS
[facility]  Initial Consult Note  Date: 8/16/2020    Hospital day :3     Impression:   1. Morbid obesity status post gastric banding  2. Bilateral lower extremity lymphedema  3. Bilateral medial thigh cellulitis    Recommendations   · The patient reports history of MRSA in the past.  · The clinical picture seems consistent with streptococcal infection. · The patient does have some superficial wounds and that does raise concern for other organisms  · I will therefore continue broad-spectrum antimicrobial therapy but switch to cefepime and continue vancomycin  · Unfortunately due to the lymphedema the cellulitis will take quite some time to resolve  · I have recommended that the patient have a PICC line placed and be discharged on IV antibiotic therapy for 2 weeks    Chief complaint/reason for consultation:   Cellulitis    History of Present Illness:   Hans Hardy is a 51y.o.-year-old female who was initially admitted on 8/13/2020. Vane Abreu has a history of morbid obesity, bilateral lower extremity lymphedema and has had chronic swelling in the lower extremities for 2 to 3 months. The patient is homebound/bedbound due to history of a prior hip fracture, arthritis and the obesity. She is also on long-term diuretic therapy with Bumex and Lasix. She has a history of gastric banding in the past .  The patient has had swelling/pain/erythema in the upper thighs that has been worsening. He has been on multiple different antibiotics over the last few months but the symptoms have not resolved. The patient is also had some areas with drainage and due to the worsening symptoms without improvement the patient was brought into the emergency room for evaluation. She reports occasional chills but no subjective fevers. No nausea vomiting or diarrhea. The patient was admitted started on Zosyn and vancomycin with leg elevation  I was asked to evaluate for the bilateral lower extremity cellulitis.     I have personally reviewed the past medical history, past surgical history, medications, social history, and family history, and I have updated the database accordingly.   Past Medical History:     Past Medical History:   Diagnosis Date    Anxiety     Asthma     Cellulitis     Headache(784.0)     Hypertension     Lymphedema      Past Surgical  History:     Past Surgical History:   Procedure Laterality Date    GASTRIC BAND       Medications:      bumetanide  1 mg Oral BID    vancomycin  1,500 mg Intravenous Q12H    sodium chloride flush  10 mL Intravenous 2 times per day    enoxaparin  40 mg Subcutaneous BID    piperacillin-tazobactam  3.375 g Intravenous Q8H    vancomycin (VANCOCIN) intermittent dosing (placeholder)   Other RX Placeholder    oxybutynin  15 mg Oral TID     Social History:     Social History     Socioeconomic History    Marital status: Single     Spouse name: Not on file    Number of children: Not on file    Years of education: Not on file    Highest education level: Not on file   Occupational History    Not on file   Social Needs    Financial resource strain: Not on file    Food insecurity     Worry: Not on file     Inability: Not on file    Transportation needs     Medical: Not on file     Non-medical: Not on file   Tobacco Use    Smoking status: Never Smoker    Smokeless tobacco: Never Used   Substance and Sexual Activity    Alcohol use: Yes     Comment: social    Drug use: No    Sexual activity: Not on file   Lifestyle    Physical activity     Days per week: Not on file     Minutes per session: Not on file    Stress: Not on file   Relationships    Social connections     Talks on phone: Not on file     Gets together: Not on file     Attends Yazidi service: Not on file     Active member of club or organization: Not on file     Attends meetings of clubs or organizations: Not on file     Relationship status: Not on file    Intimate partner violence     Fear of current or Regular rate and rhythm without murmurs, rubs, or gallops. Abdomen: Obese abdomen positive bowel sounds in all 4 quadrants and Soft, nontender, nondistended. Extremities: Bilateral lower extremity lymphedema with edema in the feet  Neurologic: No gross sensory or motor deficits. Skin: There is erythema in the medial aspect of the legs bilaterally left worse than right    Medical Decision Making:   I have independently reviewed/ordered the following labs:  CBC with Differential:   Recent Labs     08/13/20 1614 08/14/20 0450   WBC 9.9 8.9   HGB 11.6* 11.6*   HCT 36.6 37.1    236   LYMPHOPCT 11*  --    MONOPCT 5  --      BMP:   Recent Labs     08/14/20  0450 08/15/20  0532 08/16/20  0550     --  141   K 3.5*  --  3.2*     --  102   CO2 28  --  27   BUN 11 11 9   CREATININE 1.00* 1.10* 1.11*   MG 1.9  --   --      Hepatic Function Panel:   Recent Labs     08/13/20 1614 08/14/20  0450   PROT 6.8 7.1   LABALBU 3.5 3.6   BILITOT 0.64 0.81   ALKPHOS 56 59   ALT 11 9   AST 11 11     Lab Results   Component Value Date    CRP 5.1 (H) 10/13/2017     Lab Results   Component Value Date    SEDRATE 18 06/20/2015       No results for input(s): PROCAL in the last 72 hours. Imaging Studies:   No results found. Cultures:     Culture, Blood 1 [[id number]]   Collected: 08/13/20 1614    Order Status: Completed  Specimen: Blood  Updated: 08/16/20 0013     Specimen Description  . BLOOD     Special Requests  RAC 4ML     Culture  NO GROWTH 3 DAYS    Culture, Blood 1 [[id number]]   Collected: 08/13/20 1615    Order Status: Completed  Specimen: Blood  Updated: 08/16/20 0013     Specimen Description  . BLOOD     Special Requests  NOT REPORTED     Culture  NO GROWTH 3 DAYS            Thank you for allowing us to participate in the care of this patient. Please call with questions.     Electronically signed by Maira Dunlap MD on 8/16/2020 at 11:56 AM      [facility]  Maira Dunlap MD  Perfect [employer] messaging  OFFICE: [phone]      This note is created with the assistance of a speech recognition program.  While intending to generate a document that actually reflects the content of the visit, the document can still have some errors including those of syntax and sound a like substitutions which may escape proof reading. In such instances, actual meaning can be extrapolated by contextual diversion.

## 2020-08-17 ENCOUNTER — APPOINTMENT (OUTPATIENT)
Dept: INTERVENTIONAL RADIOLOGY/VASCULAR | Age: 50
DRG: 570 | End: 2020-08-17
Payer: COMMERCIAL

## 2020-08-17 ENCOUNTER — APPOINTMENT (OUTPATIENT)
Dept: GENERAL RADIOLOGY | Age: 50
DRG: 570 | End: 2020-08-17
Payer: COMMERCIAL

## 2020-08-17 ENCOUNTER — APPOINTMENT (OUTPATIENT)
Dept: ULTRASOUND IMAGING | Age: 50
DRG: 570 | End: 2020-08-17
Payer: COMMERCIAL

## 2020-08-17 LAB
ANION GAP SERPL CALCULATED.3IONS-SCNC: 12 MMOL/L (ref 9–17)
BUN BLDV-MCNC: 15 MG/DL (ref 6–20)
BUN/CREAT BLD: 5 (ref 9–20)
CALCIUM SERPL-MCNC: 8.6 MG/DL (ref 8.6–10.4)
CHLORIDE BLD-SCNC: 101 MMOL/L (ref 98–107)
CO2: 25 MMOL/L (ref 20–31)
CREAT SERPL-MCNC: 2.92 MG/DL (ref 0.5–0.9)
CREATININE URINE: 46.7 MG/DL (ref 28–217)
GFR AFRICAN AMERICAN: 21 ML/MIN
GFR NON-AFRICAN AMERICAN: 17 ML/MIN
GFR SERPL CREATININE-BSD FRML MDRD: ABNORMAL ML/MIN/{1.73_M2}
GFR SERPL CREATININE-BSD FRML MDRD: ABNORMAL ML/MIN/{1.73_M2}
GLUCOSE BLD-MCNC: 98 MG/DL (ref 70–99)
POTASSIUM SERPL-SCNC: 3.4 MMOL/L (ref 3.7–5.3)
SODIUM BLD-SCNC: 138 MMOL/L (ref 135–144)
SODIUM,UR: 26 MMOL/L
TOTAL CK: 300 U/L (ref 26–192)

## 2020-08-17 PROCEDURE — 76770 US EXAM ABDO BACK WALL COMP: CPT

## 2020-08-17 PROCEDURE — 6360000002 HC RX W HCPCS: Performed by: INTERNAL MEDICINE

## 2020-08-17 PROCEDURE — 51798 US URINE CAPACITY MEASURE: CPT

## 2020-08-17 PROCEDURE — 6370000000 HC RX 637 (ALT 250 FOR IP): Performed by: FAMILY MEDICINE

## 2020-08-17 PROCEDURE — 36415 COLL VENOUS BLD VENIPUNCTURE: CPT

## 2020-08-17 PROCEDURE — 71045 X-RAY EXAM CHEST 1 VIEW: CPT

## 2020-08-17 PROCEDURE — 99232 SBSQ HOSP IP/OBS MODERATE 35: CPT | Performed by: FAMILY MEDICINE

## 2020-08-17 PROCEDURE — 82550 ASSAY OF CK (CPK): CPT

## 2020-08-17 PROCEDURE — 82570 ASSAY OF URINE CREATININE: CPT

## 2020-08-17 PROCEDURE — 99232 SBSQ HOSP IP/OBS MODERATE 35: CPT | Performed by: INTERNAL MEDICINE

## 2020-08-17 PROCEDURE — 84300 ASSAY OF URINE SODIUM: CPT

## 2020-08-17 PROCEDURE — C1751 CATH, INF, PER/CENT/MIDLINE: HCPCS

## 2020-08-17 PROCEDURE — 2580000003 HC RX 258: Performed by: FAMILY MEDICINE

## 2020-08-17 PROCEDURE — 36573 INSJ PICC RS&I 5 YR+: CPT

## 2020-08-17 PROCEDURE — 02H633Z INSERTION OF INFUSION DEVICE INTO RIGHT ATRIUM, PERCUTANEOUS APPROACH: ICD-10-PCS | Performed by: RADIOLOGY

## 2020-08-17 PROCEDURE — 2580000003 HC RX 258: Performed by: INTERNAL MEDICINE

## 2020-08-17 PROCEDURE — 1200000000 HC SEMI PRIVATE

## 2020-08-17 PROCEDURE — 80048 BASIC METABOLIC PNL TOTAL CA: CPT

## 2020-08-17 PROCEDURE — 6360000002 HC RX W HCPCS: Performed by: FAMILY MEDICINE

## 2020-08-17 PROCEDURE — 6370000000 HC RX 637 (ALT 250 FOR IP): Performed by: NURSE PRACTITIONER

## 2020-08-17 RX ORDER — SODIUM CHLORIDE 9 MG/ML
INJECTION, SOLUTION INTRAVENOUS CONTINUOUS
Status: DISCONTINUED | OUTPATIENT
Start: 2020-08-17 | End: 2020-08-21 | Stop reason: HOSPADM

## 2020-08-17 RX ORDER — HEPARIN SODIUM 5000 [USP'U]/ML
5000 INJECTION, SOLUTION INTRAVENOUS; SUBCUTANEOUS EVERY 8 HOURS SCHEDULED
Status: DISCONTINUED | OUTPATIENT
Start: 2020-08-17 | End: 2020-08-21 | Stop reason: HOSPADM

## 2020-08-17 RX ADMIN — CEFEPIME HYDROCHLORIDE 2 G: 2 INJECTION, POWDER, FOR SOLUTION INTRAVENOUS at 03:00

## 2020-08-17 RX ADMIN — SODIUM CHLORIDE: 9 INJECTION, SOLUTION INTRAVENOUS at 14:11

## 2020-08-17 RX ADMIN — SODIUM CHLORIDE 450 MG: 9 INJECTION, SOLUTION INTRAVENOUS at 14:58

## 2020-08-17 RX ADMIN — OXYCODONE HYDROCHLORIDE AND ACETAMINOPHEN 1 TABLET: 10; 325 TABLET ORAL at 08:19

## 2020-08-17 RX ADMIN — HEPARIN SODIUM 5000 UNITS: 5000 INJECTION INTRAVENOUS; SUBCUTANEOUS at 14:15

## 2020-08-17 RX ADMIN — CEFEPIME HYDROCHLORIDE 2 G: 2 INJECTION, POWDER, FOR SOLUTION INTRAVENOUS at 14:11

## 2020-08-17 RX ADMIN — OXYCODONE HYDROCHLORIDE AND ACETAMINOPHEN 1 TABLET: 10; 325 TABLET ORAL at 17:38

## 2020-08-17 RX ADMIN — OXYCODONE HYDROCHLORIDE AND ACETAMINOPHEN 1 TABLET: 10; 325 TABLET ORAL at 12:19

## 2020-08-17 RX ADMIN — OXYCODONE HYDROCHLORIDE AND ACETAMINOPHEN 1 TABLET: 10; 325 TABLET ORAL at 21:39

## 2020-08-17 RX ADMIN — HEPARIN SODIUM 5000 UNITS: 5000 INJECTION INTRAVENOUS; SUBCUTANEOUS at 21:28

## 2020-08-17 RX ADMIN — OXYBUTYNIN CHLORIDE 15 MG: 5 TABLET, EXTENDED RELEASE ORAL at 14:14

## 2020-08-17 RX ADMIN — OXYCODONE HYDROCHLORIDE AND ACETAMINOPHEN 1 TABLET: 10; 325 TABLET ORAL at 03:03

## 2020-08-17 RX ADMIN — Medication 10 ML: at 07:55

## 2020-08-17 RX ADMIN — OXYBUTYNIN CHLORIDE 15 MG: 5 TABLET, EXTENDED RELEASE ORAL at 21:28

## 2020-08-17 RX ADMIN — HEPARIN SODIUM 5000 UNITS: 5000 INJECTION INTRAVENOUS; SUBCUTANEOUS at 09:46

## 2020-08-17 RX ADMIN — VANCOMYCIN HYDROCHLORIDE 1250 MG: 1.25 INJECTION, POWDER, LYOPHILIZED, FOR SOLUTION INTRAVENOUS at 07:51

## 2020-08-17 RX ADMIN — OXYBUTYNIN CHLORIDE 15 MG: 5 TABLET, EXTENDED RELEASE ORAL at 08:19

## 2020-08-17 ASSESSMENT — PAIN DESCRIPTION - PAIN TYPE
TYPE: ACUTE PAIN
TYPE: ACUTE PAIN
TYPE: CHRONIC PAIN

## 2020-08-17 ASSESSMENT — ENCOUNTER SYMPTOMS
GASTROINTESTINAL NEGATIVE: 1
NAUSEA: 0
CHEST TIGHTNESS: 0
ABDOMINAL PAIN: 0
SHORTNESS OF BREATH: 0
CONSTIPATION: 0
BLOOD IN STOOL: 0
WHEEZING: 0
COLOR CHANGE: 1
RHINORRHEA: 0
RESPIRATORY NEGATIVE: 1
VOMITING: 0
DIARRHEA: 0
COUGH: 0

## 2020-08-17 ASSESSMENT — PAIN DESCRIPTION - DESCRIPTORS
DESCRIPTORS: ACHING;BURNING;DISCOMFORT
DESCRIPTORS: BURNING;DISCOMFORT
DESCRIPTORS: ACHING;DISCOMFORT;BURNING
DESCRIPTORS: BURNING
DESCRIPTORS: BURNING;DISCOMFORT

## 2020-08-17 ASSESSMENT — PAIN DESCRIPTION - ONSET
ONSET: ON-GOING
ONSET: ON-GOING

## 2020-08-17 ASSESSMENT — PAIN DESCRIPTION - ORIENTATION
ORIENTATION: RIGHT;LEFT
ORIENTATION: LEFT;RIGHT
ORIENTATION: RIGHT;LEFT
ORIENTATION: RIGHT;LEFT

## 2020-08-17 ASSESSMENT — PAIN DESCRIPTION - FREQUENCY
FREQUENCY: CONTINUOUS

## 2020-08-17 ASSESSMENT — PAIN SCALES - GENERAL
PAINLEVEL_OUTOF10: 10
PAINLEVEL_OUTOF10: 9
PAINLEVEL_OUTOF10: 0
PAINLEVEL_OUTOF10: 5
PAINLEVEL_OUTOF10: 10
PAINLEVEL_OUTOF10: 10
PAINLEVEL_OUTOF10: 8

## 2020-08-17 ASSESSMENT — PAIN DESCRIPTION - LOCATION
LOCATION: LEG

## 2020-08-17 NOTE — PROGRESS NOTES
Physical Therapy        DATE: 2020    NAME: Frandy Stanton  MRN: [id number]   : 1970    Patient not seen this date for Physical Therapy due to:  [] Blood transfusion in progress  [] Cancel by RN  [] Hemodialysis  [x]  Refusal by Patient (Patient declined PT treatment at the time due to fatigue)   [] Spine Precautions   [] Strict Bedrest  [] Surgery  [] Testing      [] Other        [] PT being discontinued at this time. Patient independent. No further needs. [] PT being discontinued at this time as the patient has been transferred to hospice care. No further needs.     Azul Cho, PTA

## 2020-08-17 NOTE — CARE COORDINATION
Dr. Blanca Ocampo rounded. IV ATB changed to Daptomycin 450mg every 24 hours. Will need home IV ATB therapy for 2 weeks. Awaiting script from ID. Referral called to Phani Jaramillo from [place] and awaiting benefit. Referral called to [place] from Pending sale to [facility]. Face sheet faxed to [facility]. Pt will have PICC insertion today. CATHRYN initiated. The Plan for Transition of Care is related to the following treatment goals: Skilled nursing for home IV ATB    The Patient  was provided with a choice of provider and agrees   with the discharge plan. [x] Yes [] No    Freedom of choice list was provided with basic dialogue that supports the patient's individualized plan of care/goals, treatment preferences and shares the quality data associated with the providers.  [x] Yes [] No

## 2020-08-17 NOTE — PROGRESS NOTES
[facility] [place] - INPATIENT      Daily Progress Note     Admit Date: 8/13/2020  Bed/Room No.  2005/2005-01  Admitting Physician : Italia Sanderson MD  Code Status :Full Code  Hospital Day:  LOS: 4 days   Chief Complaint:     Chief Complaint   Patient presents with    Cellulitis     L leg     Principal Problem:    Cellulitis of anterior lower leg  Active Problems:    Lymphedema    Morbid obesity ([address]  Resolved Problems:    * No resolved hospital problems. *    Subjective : Interval History/Significant events :  08/17/20    Patient has No major change in swelling, erythema, induration. She has seepage from thigh cellulitis. Denies any fever, chills. Patient is making good urine. She is eating and drinking okay without any loss of appetite. Vitals - Stable afebrile  Labs - elevated creatinine. Nursing notes , Consults notes reviewed. Overnight events and updates discussed with Nursing staff . Background History:         Rena Mirza is 51 y.o. female  Who was admitted to the hospital on 8/13/2020 for treatment of Cellulitis of anterior lower leg. Patient was brought to emergency room by her sister for bilateral lower extremity Swelling, redness, pain. Patient has known history of lymphedema, morbid obesity. She has been struggling with lower extremity swelling for last 2 to 3 months. She is homebound and has been visited by her PCP for home visits. She was treated with multiple courses of antibiotics and last 2 months. Patient also has scheduled appointment with lymphedema clinic. She noted redness and left leg around the knee and inner thigh associated with pain. Patient also reported some drainage in last week. Patient also complains of swelling and pain in right inner thigh. She denies any fevers but has been having chills. Evaluation emergency room showed temperature 98.5, blood pressure 152/109, BMI 71.4 lab evaluation showed normal electrolytes, kidney function.    Patient reports that she has difficulty in ambulation due to hip fracture and knee arthritis. She is on long-term diuretics Bumex and Lasix. Patient had gastric banding before. Patient was started on empiric IV vancomycin, Zosyn. Patient had mild improvement in cellulitis however developed progressive declining kidney function. PMH:  Past Medical History:   Diagnosis Date    Anxiety     Asthma     Cellulitis     Headache(784.0)     Hypertension     Lymphedema       Allergies: Allergies   Allergen Reactions    Clindamycin/Lincomycin     Estrogens     Fish-Derived Products     Glucophage [Metformin Hcl]     Ibuprofen     Iodine     Lisinopril     Medrol [Methylprednisolone]      itching    Metformin And Related     Morphine Itching    Nsaids      Pt has had a gastric banding      Medications :  cefepime, 2 g, Intravenous, Q12H  vancomycin, 1,250 mg, Intravenous, Q12H  bumetanide, 1 mg, Oral, BID  sodium chloride flush, 10 mL, Intravenous, 2 times per day  enoxaparin, 40 mg, Subcutaneous, BID  vancomycin (VANCOCIN) intermittent dosing (placeholder), , Other, RX Placeholder  oxybutynin, 15 mg, Oral, TID        Review of Systems   Review of Systems   Constitutional: Negative for appetite change, fatigue, fever and unexpected weight change. HENT: Negative for congestion, rhinorrhea and sneezing. Eyes: Negative for visual disturbance. Respiratory: Negative for cough, chest tightness, shortness of breath and wheezing. Cardiovascular: Positive for leg swelling. Negative for chest pain and palpitations. Gastrointestinal: Negative for abdominal pain, blood in stool, constipation, diarrhea, nausea and vomiting. Genitourinary: Negative for dysuria, enuresis, frequency and hematuria. Musculoskeletal: Negative for arthralgias and myalgias. Skin: Positive for color change and wound. Negative for rash. Neurological: Negative for dizziness, weakness, light-headedness and headaches.    Hematological: Does not bruise/bleed easily. Psychiatric/Behavioral: Negative for dysphoric mood and sleep disturbance. Objective :      Current Vitals : Temp: 98.7 °F (37.1 °C),  Pulse: 91, Resp: 18, BP: 124/73, SpO2: 95 %  Last 24 Hrs Vitals   Patient Vitals for the past 24 hrs:   BP Temp Temp src Pulse Resp SpO2 Weight   08/17/20 0800 124/73 98.7 °F (37.1 °C) Oral 91 18 95 % --   08/17/20 0401 -- -- -- -- -- -- (!) 419 lb (190.1 kg)   08/16/20 1935 129/67 99.5 °F (37.5 °C) Oral 99 16 97 % --     Intake / output   08/16 0701 - 08/17 0700  In: 360 [P.O.:360]  Out: 1600 [Urine:1600]  Physical Exam:  Physical Exam  Vitals signs and nursing note reviewed. Constitutional:       General: She is not in acute distress. Appearance: She is not diaphoretic. HENT:      Head: Normocephalic and atraumatic. Nose:      Right Sinus: No maxillary sinus tenderness or frontal sinus tenderness. Left Sinus: No maxillary sinus tenderness or frontal sinus tenderness. Mouth/Throat:      Pharynx: No oropharyngeal exudate. Eyes:      General: No scleral icterus. Conjunctiva/sclera: Conjunctivae normal.      Pupils: Pupils are equal, round, and reactive to light. Neck:      Musculoskeletal: Full passive range of motion without pain and neck supple. Thyroid: No thyromegaly. Vascular: No JVD. Cardiovascular:      Rate and Rhythm: Normal rate and regular rhythm. Pulses:           Dorsalis pedis pulses are 2+ on the right side and 2+ on the left side. Heart sounds: Normal heart sounds. No murmur. Pulmonary:      Effort: Pulmonary effort is normal.      Breath sounds: Normal breath sounds. No wheezing or rales. Abdominal:      Palpations: Abdomen is soft. There is no mass. Tenderness: There is no abdominal tenderness. Musculoskeletal:      Comments: Bilateral lower extremity swelling, pitting edema, induration, swelling lower thigh with erythema, tender to touch.    Lymphadenopathy:      Head: Right side of head: No submandibular adenopathy. Left side of head: No submandibular adenopathy. Cervical: No cervical adenopathy. Skin:     General: Skin is warm. Neurological:      Mental Status: She is alert and oriented to person, place, and time. Motor: No tremor. Psychiatric:         Behavior: Behavior is cooperative. Laboratory findings:    No results for input(s): WBC, HGB, HCT, PLT, SEDRATE, INR in the last 72 hours. Invalid input(s): PT  Recent Labs     08/15/20  0532 08/16/20  0550 08/17/20  0505   NA  --  141 138   K  --  3.2* 3.4*   CL  --  102 101   CO2  --  27 25   GLUCOSE  --  102* 98   BUN 11 9 15   CREATININE 1.10* 1.11* 2.92*   CALCIUM  --  8.4* 8.6     No results for input(s): PROT, LABALBU, LABA1C, A5SQZBG, G2TRSWI, FT4, TSH, AST, ALT, LDH, GGT, ALKPHOS, BILITOT, BILIDIR, AMMONIA, AMYLASE, LIPASE, LACTATE, CHOL, HDL, LDLCHOLESTEROL, CHOLHDLRATIO, TRIG, VLDL, BNP, TROPONINI, CKTOTAL, CKMB, CKMBINDEX, RF, JAMES in the last 72 hours. Specific Gravity, UA   Date Value Ref Range Status   02/11/2016 1.010 1.005 - 1.030 Final     Protein, UA   Date Value Ref Range Status   02/11/2016 1+ (A) NEG Final     RBC, UA   Date Value Ref Range Status   02/11/2016 None 0 - 2 /HPF Final     Bacteria, UA   Date Value Ref Range Status   02/11/2016 FEW (A) NONE Final     Comment:     Performed at [facility] [address], [address] [facility] [phone]       Nitrite, Urine   Date Value Ref Range Status   02/11/2016 NEGATIVE NEG Final     WBC, UA   Date Value Ref Range Status   02/11/2016 0 TO 2 0 - 5 /HPF Final     Leukocyte Esterase, Urine   Date Value Ref Range Status   02/11/2016 NEGATIVE NEG Final     Comment:     Performed at [facility] [address] [address], [address] [facility] [phone]         Imaging / Clinical Data :-   No results found.      Clinical Course : gradually improving  Assessment and Plan  :        1. Bilateral lower extremity lymphedema with acute cellulitis -discontinue vancomycin. Antibiotics changed to daptomycin and cefepime. Elevation leg. Discontinue Bumex. Start normal saline at 50 ml/hour. 2. Acute kidney injury secondary to antibiotics and diuretics. Vanco stopped, continue IVF. Consult nephrology. 3. Lymphedema bilateral lower extremity-continue compression stocking, lymphedema clinic. Patient has appointment scheduled already as outpatient in September. 4. Extreme obesity s/p gastric banding. Patient plans to follow-up with. Except she has outpatient. 5. Essential hypertension-controlled       Continue to monitor vitals , Intake / output ,  Cell count , HGB , Kidney function, oxygenation  as indicated . Plan and updates discussed with patient ,  answers  explained to satisfaction.    Plan discussed with Staff Mattie GRAMAJO     (Please note that portions of this note were completed with a voice recognition program. Efforts were made to edit the dictations but occasionally words are mis-transcribed.)      Eva Castro MD  8/17/2020

## 2020-08-17 NOTE — PROGRESS NOTES
vomiting or diarrhea.     The patient was admitted started on Zosyn and vancomycin with leg elevation  I was asked to evaluate for the bilateral lower extremity cellulitis. Current evaluation:2020    /73   Pulse 91   Temp 98.7 °F (37.1 °C) (Oral)   Resp 18   Ht 5' 4\" (1.626 m)   Wt (!) 419 lb (190.1 kg)   SpO2 95%   BMI 71.92 kg/m²     Temperature Range: Temp: 98.7 °F (37.1 °C) Temp  Av.1 °F (37.3 °C)  Min: 98.7 °F (37.1 °C)  Max: 99.5 °F (37.5 °C)  The patient is seen and evaluated at bedside she is awake and alert in no acute distress. She did have the midline in the left upper extremity infiltrate. No subjective fevers or chills. No abdominal pain nausea vomiting or diarrhea. Review of Systems   Constitutional: Negative. Respiratory: Negative. Cardiovascular: Negative. Gastrointestinal: Negative. Genitourinary: Negative. Musculoskeletal: Negative. Skin: Negative. Neurological: Negative. Psychiatric/Behavioral: Negative. Physical Examination :     Physical Exam  Constitutional:       Appearance: She is obese. HENT:      Head: Normocephalic and atraumatic. Eyes:      General: No scleral icterus. Pupils: Pupils are equal, round, and reactive to light. Neck:      Musculoskeletal: Normal range of motion and neck supple. Cardiovascular:      Rate and Rhythm: Normal rate. Heart sounds: Normal heart sounds. No murmur. Pulmonary:      Effort: Pulmonary effort is normal.      Breath sounds: Normal breath sounds. No wheezing or rales. Abdominal:      General: Bowel sounds are normal.      Palpations: Abdomen is soft. There is no mass. Tenderness: There is no abdominal tenderness. Musculoskeletal: Normal range of motion. General: No deformity. Lymphadenopathy:      Cervical: No cervical adenopathy. Skin:     General: Skin is warm and dry. Findings: No rash.    Neurological:      Mental Status: She is alert and oriented to person, place, and time. Laboratory data:   I have independently reviewed the followinglabs:  CBC with Differential: No results for input(s): WBC, HGB, HCT, PLT, SEGSPCT, BANDSPCT, LYMPHOPCT, MONOPCT, EOSPCT in the last 72 hours. BMP:   Recent Labs     08/16/20  0550 08/17/20  0505    138   K 3.2* 3.4*    101   CO2 27 25   BUN 9 15   CREATININE 1.11* 2.92*     Hepatic Function Panel: No results for input(s): PROT, LABALBU, BILIDIR, IBILI, BILITOT, ALKPHOS, ALT, AST in the last 72 hours. Recent Labs     08/14/20 2023 08/16/20  1456   VANCOTROUGH 24.6* 29.8*      Lab Results   Component Value Date    CRP 5.1 (H) 10/13/2017     Lab Results   Component Value Date    SEDRATE 18 06/20/2015       No results for input(s): PROCAL in the last 72 hours. Imaging Studies:   Renal ultrasound is pending    Cultures:     Culture, Blood 1 [[id number]]   Collected: 08/13/20 1615    Order Status: Completed  Specimen: Blood  Updated: 08/17/20 0015     Specimen Description  . BLOOD     Special Requests  NOT REPORTED     Culture  NO GROWTH 4 DAYS    Culture, Blood 1 [[id number]]   Collected: 08/13/20 1614    Order Status: Completed  Specimen: Blood  Updated: 08/17/20 0015     Specimen Description  . BLOOD     Special Requests  RAC 4ML     Culture  NO GROWTH 4 DAYS      Medications:      heparin (porcine)  5,000 Units Subcutaneous 3 times per day    daptomycin (CUBICIN) IVPB  4 mg/kg (Adjusted) Intravenous Q24H    cefepime  2 g Intravenous Q12H    [Held by provider] bumetanide  1 mg Oral BID    sodium chloride flush  10 mL Intravenous 2 times per day    oxybutynin  15 mg Oral TID           [facility]  Sommre Wallace MD  [employer] messaging  OFFICE: [phone]      Electronically signed by Sommer Wallace MD on 8/17/2020 at 10:11 AM  Thank you for allowing us to participate in the care of this patient. Please call with questions.     This note iscreated with the assistance of a speech recognition program.  While intending to generate a document that actually reflects the content of the visit, the document can still have some errors including those of syntax andsound a like substitutions which may escape proof reading. In such instances, actual meaning can be extrapolated by contextual diversion.

## 2020-08-17 NOTE — PLAN OF CARE
Problem: Falls - Risk of:  Goal: Will remain free from falls  Description: Will remain free from falls  Outcome: Ongoing     Problem: Falls - Risk of:  Goal: Absence of physical injury  Description: Absence of physical injury  Outcome: Ongoing     Problem: Discharge Planning:  Goal: Discharged to appropriate level of care  Description: Discharged to appropriate level of care  Outcome: Ongoing     Problem:  Body Temperature - Imbalanced:  Goal: Ability to maintain a body temperature in the normal range will improve  Description: Ability to maintain a body temperature in the normal range will improve  Outcome: Ongoing     Problem: Mobility - Impaired:  Goal: Mobility will improve to maximum level  Description: Mobility will improve to maximum level  Outcome: Ongoing     Problem: Pain:  Goal: Pain level will decrease  Description: Pain level will decrease  Outcome: Ongoing     Problem: Pain:  Goal: Control of acute pain  Description: Control of acute pain  Outcome: Ongoing     Problem: Pain:  Goal: Control of chronic pain  Description: Control of chronic pain  Outcome: Ongoing     Problem: Skin Integrity - Impaired:  Goal: Absence of new skin breakdown  Description: Absence of new skin breakdown  Outcome: Ongoing     Problem: Pain:  Goal: Pain level will decrease  Description: Pain level will decrease  Outcome: Ongoing     Problem: Pain:  Goal: Control of acute pain  Description: Control of acute pain  Outcome: Ongoing     Problem: Pain:  Goal: Control of chronic pain  Description: Control of chronic pain  Outcome: Ongoing     Problem: Nutrition  Goal: Optimal nutrition therapy  Outcome: Ongoing     Problem: IP MOBILITY  Goal: LTG - patient will ambulate community distance  Outcome: Ongoing     Problem: IP MOBILITY  Goal: LTG - Patient will navigate 4-6 steps with rails/device  Outcome: Ongoing     Problem: Skin Integrity:  Goal: Will show no infection signs and symptoms  Description: Will show no infection signs and symptoms  Outcome: Ongoing     Problem: Skin Integrity:  Goal: Absence of new skin breakdown  Description: Absence of new skin breakdown  Outcome: Ongoing

## 2020-08-17 NOTE — PROGRESS NOTES
5.3 (High) Factors Contributing to Score   Calculated 8/17/2020 16:55 32% SIRS pulse criterion is met   Early Detection of Sepsis Model 21% Number of active cephalosporin orders is 2     14% Diagnosis of hypertension is present     13% Age is 51     8% Number of PICCs is 1     4% Number of active penicillin orders is 1     3% Diagnosis of obesity is present     3% Number of active analgesic antipyretic orders is 2       Dr. Donna Cuevas notified via perfect serve of sepsis score which triggered

## 2020-08-17 NOTE — CONSULTS
NEPHROLOGY CONSULT     Patient :  Gonzales Laws; 51 y.o. MRN# [id number]  Location:  2005/2005-01  Attending:  Maki Ruiz MD  Admit Date:  8/13/2020   Hospital Day: 4      Reason for Consult: CARLEY      Chief Complaint: Bilateral upper thigh cellulitis  History Obtained From:  Patient    History of Present Illness: This is a 51 y.o. female and past medical history of essential hypertension, lymphedema presented to the hospital on 8/13/2020 with complaints of bilateral upper thigh pain and erythema, she has been having swelling of the legs for last 3 months and has been treated for cellulitis the symptoms started this time on Thursday when she developed pain on the bilateral upper thigh. Patient denied any fever but complained of some chills no nausea vomiting diarrhea  Patient was admitted for cellulitis, patient was treated with IV vancomycin. Serum creatinine on admission was 0.8 mg/dL, been stable till yesterday 8/16/20 serum creatinine was 1.1 mg/dL which increased to 2.9 mg/dL this morning and therefore nephrology consultation was requested. Vancomycin trough level was noted to be 29.8 yesterday 8/16/2020. No IV contrast was used  No hypotension was noted  Patient takes naproxen at home. Patient denies dysuria, gross hematuria, flank pain, nocturia, urgency, passing frothy urine or urinary incontinence. There has been no recent exposure to IV contrast.   There is no history  of paraprotein disease. Pt denies any history of recurrent UTI or kidney stones. Medication review shows use of diuretics.     Past Medical History:        Diagnosis Date    Anxiety     Asthma     Cellulitis     Headache(784.0)     Hypertension     Lymphedema        Past Surgical History:        Procedure Laterality Date    GASTRIC BAND         Current Medications:    heparin (porcine) injection 5,000 Units, 3 times per day  0.9 % sodium chloride infusion, Continuous  DAPTOmycin (CUBICIN) 450 mg in sodium chloride 0.9 % 50 activity: Not on file   Other Topics Concern    Not on file   Social History Narrative    Not on file       Family History:   Family History   Problem Relation Age of Onset    Thyroid Disease Mother     Asthma Mother     Heart Failure Mother     COPD Mother     Heart Attack Father     Diabetes Father     Stroke Father        Review of Systems:    Constitutional: No fever, no chills, no night sweats, fatigue, generalized weakness, loss of appetite  HEENT:  No headache, otalgia, itchy eyes, epistaxis, nasal discharge or sore throat. Cardiac:  No chest pain, dyspnea, orthopnea or PND, palpitations  Chest:  No cough, hemoptysis, pleuritic chest pain, wheezing,SOB  Abdomen:  No abdominal pain, nausea, vomiting, diarrhea, melena, dysphagia hematemesis,constipation, abdominal bloating, flank pain  Neuro:  No CVA, TIA or seizure like activity. Skin:   No rashes, no itching. :   No hematuria, no pyuria, no dysuria, no flank pain. Extremities:  Bilateral lower extremity swelling pain and redness in the upper thigh      Objective:  CURRENT TEMPERATURE:  Temp: 98.4 °F (36.9 °C)  MAXIMUM TEMPERATURE OVER 24HRS:  Temp (24hrs), Av.9 °F (37.2 °C), Min:98.4 °F (36.9 °C), Max:99.5 °F (37.5 °C)    CURRENT RESPIRATORY RATE:  Resp: 18  CURRENT PULSE:  Pulse: 97  CURRENT BLOOD PRESSURE:  BP: 119/82  24HR BLOOD PRESSURE RANGE:  Systolic (90VLS), CWI:482 , Min:119 , EMMA:674   ; Diastolic (22FOP), IKC:69, Min:67, Max:82    24HR INTAKE/OUTPUT:      Intake/Output Summary (Last 24 hours) at 2020 1353  Last data filed at 2020 1124  Gross per 24 hour   Intake 240 ml   Output 1300 ml   Net -1060 ml     Patient Vitals for the past 96 hrs (Last 3 readings):   Weight   20 0401 (!) 419 lb (190.1 kg)   08/15/20 0311 (!) 415 lb 3.2 oz (188.3 kg)   20 1505 (!) 415 lb (188.2 kg)       Physical Exam:  GENERAL APPEARANCE: Alert and cooperative, and appears to be in no acute distress. HEAD: normocephalic  EYES:EOMI. Not pale, anicteric   NOSE:  No nasal discharge. THROAT:  Oral cavity and pharynx normal. Moist  CARDIAC: Normal S1 and S2. No S3, S4 or murmurs. Rhythm is regular. LUNGS: Normal respiratory effort  NECK: Neck supple, non-tender without lymphadenopathy, masses or thyromegaly. MUSKULOSKELETAL: Adequately aligned spine. No joint erythema or tenderness. EXTREMITIES: Swollen bilateral lower extremity, redness noted on the medial aspect of both thighs  NEURO: Nonfocal      Labs:   CBC:No results for input(s): WBC, RBC, HGB, HCT, MCV, MCH, MCHC, RDW, PLT, MPV in the last 72 hours. BMP:   Recent Labs     08/15/20  0532 08/16/20  0550 08/17/20  0505   NA  --  141 138   K  --  3.2* 3.4*   CL  --  102 101   CO2  --  27 25   BUN 11 9 15   CREATININE 1.10* 1.11* 2.92*   GLUCOSE  --  102* 98   CALCIUM  --  8.4* 8.6      Phosphorus:  No results for input(s): PHOS in the last 72 hours. Magnesium: No results for input(s): MG in the last 72 hours. Albumin: No results for input(s): LABALBU in the last 72 hours. IRON:  No results for input(s): IRON in the last 72 hours. Iron Saturation:  Invalid input(s): PERCENTFE  TIBC:  No results for input(s): TIBC in the last 72 hours. FERRITIN:  No results for input(s): FERRITIN in the last 72 hours. SPEP: No results for input(s): SPEP in the last 72 hours. No results for input(s): PROT, ALBCAL, ALBCAL, ALBPCT, LABALPH, A1PCT, LABALPH, A2PCT, LABBETA, BETAPCT, GAMGLOB, GGPCT, PATH in the last 72 hours. UPEP: No results for input(s): TPU in the last 72 hours. Urine Sodium:    Recent Labs     08/17/20  0929   DELIA 26      Urine Potassium: No results for input(s): KUR in the last 72 hours. Urine Chloride: Invalid input(s): CLU  Urine Ph:  Invalid input(s): PO4U  Urine Osmolarity: No results for input(s): OSMOU in the last 72 hours. Urine Creatinine:    Recent Labs     08/17/20  0929   LABCREA 46.7         Radiology:  Reviewed as available.     Assessment:  Admitted with bilateral lower extremity upper thigh cellulitis, gated by acute kidney injury    1. CARLEY nonoliguric serum creatinine increased from 1.1 mg/dL yesterday 8/16/2020 increased to 2.9 mg/dL this morning, differential initial diagnosis include vancomycin nephrotoxicity ,          No use of IV contrast noted,  renal ultrasound unremarkable no hydronephrosis. 2. Hypokalemia  3. Essential hypertension         Plan:  IVF Normal saline 75 ml/hr  Agree with discontinuing vancomycin, antibiotics changed to daptomycin, continue cefepime  1. Comprehensive urine testing including Urinalysis, urine electrolytes will check urinary eosinophils. 2.  Check Post void bladder scan         Thank you for the consultation.       Electronically signed by Kaya Hernandez MD on 8/17/2020 at 1:53 PM

## 2020-08-17 NOTE — PLAN OF CARE
Problem: Falls - Risk of:  Goal: Absence of physical injury  Description: Absence of physical injury  8/16/2020 1800 by Cayden Hill RN  Outcome: Met This Shift     Problem: Pain:  Goal: Control of acute pain  Description: Control of acute pain  8/16/2020 1800 by Cayden Hill RN  Outcome: Met This Shift     Problem: Skin Integrity - Impaired:  Goal: Will show no infection signs and symptoms  Description: Will show no infection signs and symptoms  8/16/2020 1800 by Cayden Hill RN  Outcome: Met This Shift     Problem: Falls - Risk of:  Goal: Will remain free from falls  Description: Will remain free from falls  8/16/2020 2238 by Venu Love RN  Outcome: Ongoing  Note: Patient is a fall risk during this admission. Fall risk assessment was performed. Patient is absent of falls. Bed is in the lowest position. Wheels on the bed are locked. Call light and bed side table are within reach. Clutter is removed. Patient was educated to call out when needing assistance or wanting to get out of bed. Patient offered toileting assistance during rounding. Hourly rounds have been performed. Fall precautions in place, pt asks for help appropriatley  8/16/2020 1800 by Cayden Hill RN  Outcome: Met This Shift     Problem: Discharge Planning:  Goal: Discharged to appropriate level of care  Description: Discharged to appropriate level of care  8/16/2020 2238 by Venu Love RN  Outcome: Ongoing  8/16/2020 1800 by Cayden Hill RN  Outcome: Ongoing     Problem:  Body Temperature - Imbalanced:  Goal: Ability to maintain a body temperature in the normal range will improve  Description: Ability to maintain a body temperature in the normal range will improve  8/16/2020 2238 by Venu Love RN  Outcome: Ongoing  Note: Pt afebrile this PM  8/16/2020 1800 by Cayden Hill RN  Outcome: Met This Shift     Problem: Mobility - Impaired:  Goal: Mobility will improve to maximum level  Description: Mobility will improve to maximum level  8/16/2020 2238 by Annabelle Oliveira RN  Outcome: Ongoing  Note: Able to move to the side of the bed and be out of the bed for short distances, uses commode, uses cane to aide her  8/16/2020 1800 by Charu Hanson RN  Outcome: Ongoing     Problem: Pain:  Goal: Pain level will decrease  Description: Pain level will decrease  8/16/2020 2238 by Annabelle Oliveira RN  Outcome: Ongoing  Note: Pain level assessment complete. Patient educated on pain scale and control interventions  PRN pain medication given per patient request  Patient instructed to call out with new onset of pain or unrelieved pain   8/16/2020 1800 by Charu Hanson RN  Outcome: Met This Shift  Goal: Control of chronic pain  Description: Control of chronic pain  8/16/2020 1800 by Charu Hanson RN  Outcome: Ongoing     Problem: Pain:  Goal: Pain level will decrease  Description: Pain level will decrease  8/16/2020 2238 by Annabelle Oliveira RN  Outcome: Ongoing  Note: Pain level assessment complete.    Patient educated on pain scale and control interventions  PRN pain medication given per patient request  Patient instructed to call out with new onset of pain or unrelieved pain   8/16/2020 1800 by Charu Hanson RN  Outcome: Met This Shift     Problem: Skin Integrity:  Goal: Will show no infection signs and symptoms  Description: Will show no infection signs and symptoms  Outcome: Ongoing

## 2020-08-18 PROBLEM — N17.9 AKI (ACUTE KIDNEY INJURY) (HCC): Status: ACTIVE | Noted: 2020-08-18

## 2020-08-18 LAB
ANION GAP SERPL CALCULATED.3IONS-SCNC: 13 MMOL/L (ref 9–17)
BUN BLDV-MCNC: 19 MG/DL (ref 6–20)
BUN/CREAT BLD: 4 (ref 9–20)
CALCIUM SERPL-MCNC: 8.5 MG/DL (ref 8.6–10.4)
CHLORIDE BLD-SCNC: 101 MMOL/L (ref 98–107)
CO2: 24 MMOL/L (ref 20–31)
CREAT SERPL-MCNC: 4.74 MG/DL (ref 0.5–0.9)
GFR AFRICAN AMERICAN: 12 ML/MIN
GFR NON-AFRICAN AMERICAN: 10 ML/MIN
GFR SERPL CREATININE-BSD FRML MDRD: ABNORMAL ML/MIN/{1.73_M2}
GFR SERPL CREATININE-BSD FRML MDRD: ABNORMAL ML/MIN/{1.73_M2}
GLUCOSE BLD-MCNC: 99 MG/DL (ref 70–99)
POTASSIUM SERPL-SCNC: 3.5 MMOL/L (ref 3.7–5.3)
SODIUM BLD-SCNC: 138 MMOL/L (ref 135–144)

## 2020-08-18 PROCEDURE — 99232 SBSQ HOSP IP/OBS MODERATE 35: CPT | Performed by: FAMILY MEDICINE

## 2020-08-18 PROCEDURE — 2580000003 HC RX 258: Performed by: INTERNAL MEDICINE

## 2020-08-18 PROCEDURE — 6370000000 HC RX 637 (ALT 250 FOR IP): Performed by: NURSE PRACTITIONER

## 2020-08-18 PROCEDURE — 1200000000 HC SEMI PRIVATE

## 2020-08-18 PROCEDURE — 6370000000 HC RX 637 (ALT 250 FOR IP): Performed by: FAMILY MEDICINE

## 2020-08-18 PROCEDURE — 6360000002 HC RX W HCPCS: Performed by: FAMILY MEDICINE

## 2020-08-18 PROCEDURE — 99232 SBSQ HOSP IP/OBS MODERATE 35: CPT | Performed by: INTERNAL MEDICINE

## 2020-08-18 PROCEDURE — 6360000002 HC RX W HCPCS: Performed by: INTERNAL MEDICINE

## 2020-08-18 PROCEDURE — 36415 COLL VENOUS BLD VENIPUNCTURE: CPT

## 2020-08-18 PROCEDURE — 80048 BASIC METABOLIC PNL TOTAL CA: CPT

## 2020-08-18 RX ADMIN — OXYCODONE HYDROCHLORIDE AND ACETAMINOPHEN 1 TABLET: 10; 325 TABLET ORAL at 06:19

## 2020-08-18 RX ADMIN — SODIUM CHLORIDE 450 MG: 9 INJECTION, SOLUTION INTRAVENOUS at 11:30

## 2020-08-18 RX ADMIN — OXYBUTYNIN CHLORIDE 15 MG: 5 TABLET, EXTENDED RELEASE ORAL at 22:01

## 2020-08-18 RX ADMIN — OXYCODONE HYDROCHLORIDE AND ACETAMINOPHEN 1 TABLET: 10; 325 TABLET ORAL at 22:02

## 2020-08-18 RX ADMIN — HEPARIN SODIUM 5000 UNITS: 5000 INJECTION INTRAVENOUS; SUBCUTANEOUS at 05:46

## 2020-08-18 RX ADMIN — OXYBUTYNIN CHLORIDE 15 MG: 5 TABLET, EXTENDED RELEASE ORAL at 14:12

## 2020-08-18 RX ADMIN — CEFEPIME HYDROCHLORIDE 2 G: 2 INJECTION, POWDER, FOR SOLUTION INTRAVENOUS at 14:11

## 2020-08-18 RX ADMIN — OXYCODONE HYDROCHLORIDE AND ACETAMINOPHEN 1 TABLET: 10; 325 TABLET ORAL at 02:03

## 2020-08-18 RX ADMIN — OXYCODONE HYDROCHLORIDE AND ACETAMINOPHEN 1 TABLET: 10; 325 TABLET ORAL at 11:36

## 2020-08-18 RX ADMIN — CEFEPIME HYDROCHLORIDE 2 G: 2 INJECTION, POWDER, FOR SOLUTION INTRAVENOUS at 01:54

## 2020-08-18 RX ADMIN — HEPARIN SODIUM 5000 UNITS: 5000 INJECTION INTRAVENOUS; SUBCUTANEOUS at 14:11

## 2020-08-18 RX ADMIN — OXYCODONE HYDROCHLORIDE AND ACETAMINOPHEN 1 TABLET: 10; 325 TABLET ORAL at 16:38

## 2020-08-18 RX ADMIN — HEPARIN SODIUM 5000 UNITS: 5000 INJECTION INTRAVENOUS; SUBCUTANEOUS at 22:02

## 2020-08-18 RX ADMIN — OXYBUTYNIN CHLORIDE 15 MG: 5 TABLET, EXTENDED RELEASE ORAL at 07:49

## 2020-08-18 ASSESSMENT — PAIN SCALES - GENERAL
PAINLEVEL_OUTOF10: 10
PAINLEVEL_OUTOF10: 0
PAINLEVEL_OUTOF10: 9
PAINLEVEL_OUTOF10: 9
PAINLEVEL_OUTOF10: 8
PAINLEVEL_OUTOF10: 7

## 2020-08-18 ASSESSMENT — ENCOUNTER SYMPTOMS
WHEEZING: 0
DIARRHEA: 0
GASTROINTESTINAL NEGATIVE: 1
RESPIRATORY NEGATIVE: 1
ABDOMINAL PAIN: 0
CONSTIPATION: 0
COUGH: 0
NAUSEA: 0
CHEST TIGHTNESS: 0
BLOOD IN STOOL: 0
SHORTNESS OF BREATH: 0
VOMITING: 0

## 2020-08-18 NOTE — PROGRESS NOTES
Physical Therapy  DATE: 2020    NAME: Diana Canchola  MRN: [id number]   : 1970    Patient not seen this date for Physical Therapy due to:  [] Blood transfusion in progress  [] Cancel by RN  [] Hemodialysis  [x]  Refusal by Patient (Patient declined PT treatment x 3 attempts today)  [] Spine Precautions   [] Strict Bedrest  [] Surgery  [] Testing      [] Other        [] PT being discontinued at this time. Patient independent. No further needs. [] PT being discontinued at this time as the patient has been transferred to hospice care. No further needs.     Ivy Blackburn, PTA

## 2020-08-18 NOTE — PLAN OF CARE
Problem: Falls - Risk of:  Goal: Will remain free from falls  Description: Will remain free from falls  8/18/2020 1101 by Carmita Torres RN  Outcome: Ongoing  Note: Room free of clutter  Hourly rounding   Non-skid socks worn  Side rails up x2  Bed low and locked  Call light in reach  Instructed to call out before getting out of bed  Anticipatory needs met  Bed alarm on  Falling star at the door and on wristband       Problem: Pain:  Goal: Pain level will decrease  Description: Pain level will decrease  8/18/2020 1101 by Carmita Torres RN  Outcome: Ongoing  Note: Pain level assessed and rated on a 0-10 scale  Assess characteristics of pain  PRN pain medication given per pt request  Non-pharmacological interventions implemented  Report ineffective pain management to physician  Update pt and family of any changes  Pt instructed to call out with new onset of pain  Continue to monitor

## 2020-08-18 NOTE — PROGRESS NOTES
NEPHROLOGY progress note    Patient :  Olivia Ledesma; 51 y.o. MRN# [id number]  Location:  2005/2005-01  Attending:  Stevie Weiner MD  Admit Date:  8/13/2020   Hospital Day: 5      Reason for Consult: CARLEY      Chief Complaint: Bilateral upper thigh cellulitis  History Obtained From:  Patient    History of Present Illness: This is a 51 y.o. female and past medical history of essential hypertension, lymphedema presented to the hospital on 8/13/2020 with complaints of bilateral upper thigh pain and erythema, she has been having swelling of the legs for last 3 months and has been treated for cellulitis the symptoms started this time on Thursday when she developed pain on the bilateral upper thigh. Patient denied any fever but complained of some chills no nausea vomiting diarrhea  Patient was admitted for cellulitis, patient was treated with IV vancomycin. Serum creatinine on admission was 0.8 mg/dL, been stable till yesterday 8/16/20 serum creatinine was 1.1 mg/dL which increased to 2.9 mg/dL this morning and therefore nephrology consultation was requested. Vancomycin trough level was noted to be 29.8 yesterday 8/16/2020. No IV contrast was used  No hypotension was noted  Patient takes naproxen at home. Patient denies dysuria, gross hematuria, flank pain, nocturia, urgency, passing frothy urine or urinary incontinence. There has been no recent exposure to IV contrast.   There is no history  of paraprotein disease. Pt denies any history of recurrent UTI or kidney stones. Medication review shows use of diuretics. Subjective/interval history:    Seen and examined, complains of pain which is controlled with pain meds.   Serum creatinine continues to rise increased to 4.7 mg/dL from 2.9 mg/dL in 24 hours  Patient is nonoliguric nonoliguric urine output 1700 mL yesterday in 24 hours  Nausea vomiting no tremors no confusion no uremic signs  Past Medical History:        Diagnosis Date    Anxiety     Asthma     Cellulitis     Headache(784.0)     Hypertension     Lymphedema        Past Surgical History:        Procedure Laterality Date    GASTRIC BAND      IR INS PICC VAD W SQ PORT GREATER THAN 5  2020    IR INS PICC VAD W SQ PORT GREATER THAN 5 2020 [facility]       Current Medications:    heparin (porcine) injection 5,000 Units, 3 times per day  0.9 % sodium chloride infusion, Continuous  DAPTOmycin (CUBICIN) 450 mg in sodium chloride 0.9 % 50 mL IVPB, Q24H  cefepime (MAXIPIME) 2 g IVPB minibag, Q12H  [Held by provider] bumetanide (BUMEX) tablet 1 mg, BID  oxyCODONE-acetaminophen (PERCOCET)  MG per tablet 1 tablet, Q4H PRN  sodium chloride flush 0.9 % injection 10 mL, 2 times per day  sodium chloride flush 0.9 % injection 10 mL, PRN  acetaminophen (TYLENOL) tablet 650 mg, Q6H PRN    Or  acetaminophen (TYLENOL) suppository 650 mg, Q6H PRN  oxybutynin (DITROPAN-XL) extended release tablet 15 mg, TID        Allergies:  Clindamycin/lincomycin; Estrogens; Fish-derived products; Glucophage [metformin hcl]; Ibuprofen; Iodine; Lisinopril; Medrol [methylprednisolone];  Metformin and related; Morphine; and Nsaids          Objective:  CURRENT TEMPERATURE:  Temp: 98.1 °F (36.7 °C)  MAXIMUM TEMPERATURE OVER 24HRS:  Temp (24hrs), Av.6 °F (37 °C), Min:98.1 °F (36.7 °C), Max:99 °F (37.2 °C)    CURRENT RESPIRATORY RATE:  Resp: 20  CURRENT PULSE:  Pulse: 90  CURRENT BLOOD PRESSURE:  BP: 133/80  24HR BLOOD PRESSURE RANGE:  Systolic (10ASN), KATHRIN:535 , Min:133 , YSU:167   ; Diastolic (10XUT), CS, Min:77, Max:83    24HR INTAKE/OUTPUT:      Intake/Output Summary (Last 24 hours) at 2020 1328  Last data filed at 2020 9154  Gross per 24 hour   Intake 914 ml   Output 1350 ml   Net -436 ml     Patient Vitals for the past 96 hrs (Last 3 readings):   Weight   20 0427 (!) 418 lb (189.6 kg)   20 040 (!) 419 lb (190.1 kg)   08/15/20 031 (!) 415 lb 3.2 oz (188.3 kg) Physical Exam:  GENERAL APPEARANCE: Alert and cooperative, and appears to be in no acute distress. HEAD: normocephalic  EYES:EOMI. Not pale, anicteric   NOSE:  No nasal discharge. THROAT:  Oral cavity and pharynx normal. Moist  CARDIAC: Normal S1 and S2. No S3, S4 or murmurs. Rhythm is regular. LUNGS: Normal respiratory effort  NECK: Neck supple, non-tender without lymphadenopathy, masses or thyromegaly. MUSKULOSKELETAL: Adequately aligned spine. No joint erythema or tenderness. EXTREMITIES: Swollen bilateral lower extremity, redness noted on the medial aspect of both thighs  NEURO: Nonfocal      Labs:   CBC:No results for input(s): WBC, RBC, HGB, HCT, MCV, MCH, MCHC, RDW, PLT, MPV in the last 72 hours. BMP:   Recent Labs     08/16/20  0550 08/17/20  0505 08/18/20  0557    138 138   K 3.2* 3.4* 3.5*    101 101   CO2 27 25 24   BUN 9 15 19   CREATININE 1.11* 2.92* 4.74*   GLUCOSE 102* 98 99   CALCIUM 8.4* 8.6 8.5*      Phosphorus:  No results for input(s): PHOS in the last 72 hours. Magnesium: No results for input(s): MG in the last 72 hours. Albumin: No results for input(s): LABALBU in the last 72 hours. IRON:  No results for input(s): IRON in the last 72 hours. Iron Saturation:  Invalid input(s): PERCENTFE  TIBC:  No results for input(s): TIBC in the last 72 hours. FERRITIN:  No results for input(s): FERRITIN in the last 72 hours. SPEP: No results for input(s): SPEP in the last 72 hours. No results for input(s): PROT, ALBCAL, ALBCAL, ALBPCT, LABALPH, A1PCT, LABALPH, A2PCT, LABBETA, BETAPCT, GAMGLOB, GGPCT, PATH in the last 72 hours. UPEP: No results for input(s): TPU in the last 72 hours. Urine Sodium:    Recent Labs     08/17/20  0929   DELIA 26      Urine Potassium: No results for input(s): KUR in the last 72 hours. Urine Chloride: Invalid input(s): CLU  Urine Ph:  Invalid input(s): PO4U  Urine Osmolarity: No results for input(s): OSMOU in the last 72 hours.   Urine Creatinine:    Recent Labs     08/17/20  0929   LABCREA 46.7         Radiology:  Reviewed as available. Assessment:  Admitted with bilateral lower extremity upper thigh cellulitis, gated by acute kidney injury    1. CARLEY nonoliguric serum creatinine increased from 1.1 mg/dL yesterday 8/16/2020 increased to 2.9 mg/dL this morning, differential initial diagnosis include vancomycin nephrotoxicity ,          No use of IV contrast noted,  renal ultrasound unremarkable no hydronephrosis. 2. Hypokalemia  3. Essential hypertension         Plan:  Continue IVF Normal saline 75 ml/hr   vancomycin discontinued, continue daptomycin, continue cefepime. Discussed with the patient regarding her kidney function, her kidney function is is worsening very fast, I discussed regarding possibility of dialysis if the kidney function continues to get worse or she develops any complications of renal failure including hyperkalemia metabolic acidosis volume overload or uremic symptoms. Patient would like to wait and see how the kidney function is tomorrow next 48 hours  Continue to monitor closely          Thank you for the consultation.       Electronically signed by Erasmo Gold MD on 8/18/2020 at 1:28 PM

## 2020-08-18 NOTE — PROGRESS NOTES
electrolytes, kidney function.   Patient reports that she has difficulty in ambulation due to hip fracture and knee arthritis.  She is on long-term diuretics Bumex and Lasix.  Patient had gastric banding before. Patient was started on empiric IV vancomycin, Zosyn. Patient had mild improvement in cellulitis however developed progressive declining kidney function. Review of Systems:     Review of Systems   Constitutional: Positive for fatigue. Negative for chills, diaphoresis and fever. HENT: Negative for congestion. Eyes: Negative for visual disturbance. Respiratory: Negative for cough, chest tightness, shortness of breath and wheezing. Cardiovascular: Negative for chest pain, palpitations and leg swelling. Gastrointestinal: Negative for abdominal pain, blood in stool, constipation, diarrhea, nausea and vomiting. Genitourinary: Negative for difficulty urinating. Neurological: Negative for dizziness, weakness, light-headedness, numbness and headaches. All other systems reviewed and are negative. Medications: Allergies:     Allergies   Allergen Reactions    Clindamycin/Lincomycin     Estrogens     Fish-Derived Products     Glucophage [Metformin Hcl]     Ibuprofen     Iodine     Lisinopril     Medrol [Methylprednisolone]      itching    Metformin And Related     Morphine Itching    Nsaids      Pt has had a gastric banding       Current Meds:   Scheduled Meds:    heparin (porcine)  5,000 Units Subcutaneous 3 times per day    daptomycin (CUBICIN) IVPB  4 mg/kg (Adjusted) Intravenous Q24H    cefepime  2 g Intravenous Q12H    [Held by provider] bumetanide  1 mg Oral BID    sodium chloride flush  10 mL Intravenous 2 times per day    oxybutynin  15 mg Oral TID     Continuous Infusions:    sodium chloride 50 mL/hr at 08/17/20 1411     PRN Meds: oxyCODONE-acetaminophen, sodium chloride flush, acetaminophen **OR** acetaminophen    Data:     Past Medical History:   has a past medical history of Anxiety, Asthma, Cellulitis, Headache(784.0), Hypertension, and Lymphedema. Social History:   reports that she has never smoked. She has never used smokeless tobacco. She reports current alcohol use. She reports that she does not use drugs. Family History:   Family History   Problem Relation Age of Onset    Thyroid Disease Mother     Asthma Mother     Heart Failure Mother     COPD Mother     Heart Attack Father     Diabetes Father     Stroke Father        Vitals:  /80   Pulse 90   Temp 98.1 °F (36.7 °C) (Oral)   Resp 20   Ht 5' 4\" (1.626 m)   Wt (!) 418 lb (189.6 kg)   SpO2 98%   BMI 71.75 kg/m²   Temp (24hrs), Av.6 °F (37 °C), Min:98.1 °F (36.7 °C), Max:99 °F (37.2 °C)    No results for input(s): POCGLU in the last 72 hours. I/O (24Hr): Intake/Output Summary (Last 24 hours) at 2020 1722  Last data filed at 2020 0620  Gross per 24 hour   Intake 914 ml   Output 1350 ml   Net -436 ml       Labs:  Hematology:No results for input(s): WBC, RBC, HGB, HCT, MCV, MCH, MCHC, RDW, PLT, MPV, SEDRATE, CRP, INR, DDIMER, EP2QMIBD, LABABSO in the last 72 hours. Invalid input(s): PT  Chemistry:  Recent Labs     20  0550 20  0505 20  0919 20  0557    138  --  138   K 3.2* 3.4*  --  3.5*    101  --  101   CO2 27 25  --  24   GLUCOSE 102* 98  --  99   BUN 9 15  --  19   CREATININE 1.11* 2.92*  --  4.74*   ANIONGAP 12 12  --  13   LABGLOM 52* 17*  --  10*   GFRAA >60 21*  --  12*   CALCIUM 8.4* 8.6  --  8.5*   CKTOTAL  --   --  300*  --    No results for input(s): PROT, LABALBU, LABA1C, W2YEUCO, N4ALDEG, FT4, TSH, AST, ALT, LDH, GGT, ALKPHOS, LABGGT, BILITOT, BILIDIR, AMMONIA, AMYLASE, LIPASE, LACTATE, CHOL, HDL, LDLCHOLESTEROL, CHOLHDLRATIO, TRIG, VLDL, ZNT41KM, PHENYTOIN, PHENYF, URICACID, POCGLU in the last 72 hours.   ABG:No results found for: POCPH, PHART, PH, POCPCO2, YCP2LDI, PCO2, POCPO2, PO2ART, PO2, POCHCO3, OGP3EUW, HCO3, NBEA, PBEA, BEART, BE, THGBART, THB, DBF9YSF, AOVA3ZHG, N2DXMAOW, O2SAT, FIO2  Lab Results   Component Value Date/Time    SPECIAL NOT REPORTED 08/13/2020 04:15 PM     Lab Results   Component Value Date/Time    CULTURE NO GROWTH 5 DAYS 08/13/2020 04:15 PM       Radiology:  Us Renal Complete    Result Date: 8/17/2020  Negative ultrasound of the kidneys and urinary bladder. Xr Chest Portable    Result Date: 8/17/2020  Satisfactory right upper extremity PICC placement. No radiographic evidence of acute cardiopulmonary disease. Ir Insert Picc Vad W Sq Port >5 Years    Result Date: 8/17/2020  Successful ultrasound guided right upper extremity double lumen PICC placement. Physical Examination:        Physical Exam  Vitals signs and nursing note reviewed. Constitutional:       General: She is not in acute distress. HENT:      Head: Normocephalic and atraumatic. Eyes:      Conjunctiva/sclera: Conjunctivae normal.      Pupils: Pupils are equal, round, and reactive to light. Cardiovascular:      Rate and Rhythm: Normal rate and regular rhythm. Heart sounds: No murmur. Pulmonary:      Effort: Pulmonary effort is normal. No accessory muscle usage or respiratory distress. Breath sounds: No stridor. No decreased breath sounds, wheezing, rhonchi or rales. Abdominal:      General: Bowel sounds are normal. There is no distension. Palpations: Abdomen is soft. Abdomen is not rigid. Tenderness: There is no abdominal tenderness. There is no guarding. Musculoskeletal:         General: No tenderness. Right lower leg: Edema present. Left lower leg: Edema present. Comments: Bilateral lower extremity and Ace wrap,  medial thighs swelling and erythema are less   Skin:     General: Skin is warm and dry. Findings: No erythema, lesion or rash. Neurological:      Mental Status: She is alert and oriented to person, place, and time. Cranial Nerves: No cranial nerve deficit. Motor: No seizure activity. Psychiatric:         Speech: Speech normal.         Behavior: Behavior normal. Behavior is cooperative. Assessment:        Hospital Problems           Last Modified POA    * (Principal) Cellulitis of anterior lower leg 8/13/2020 Yes    Lymphedema 8/13/2020 Yes    Morbid obesity ([facility] [address] 8/13/2020 Yes    CARLEY (acute kidney injury) ([facility] [address] 8/18/2020 No          Plan:        Bilateral lower extremity cellulitis: Currently on cefepime and daptomycin, improving. Appreciate ID recommendation. Was on vancomycin initially but stopped secondary to CARLEY    Current lower extremity lymphedema: Continue Ace wrap, follow-up with lymphedema clinic as an outpatient    Acute kidney injury-nonoliguric: Secondary to vancomycin and diuresis, both were stopped creatinine continues to go up discussed with nephrologist ( Dr. Karen Silva) concerns it is worsening her might need dialysis  Continue fluids at 75 mL/h  Avoid nephrotoxic agents    Morbid obesity as a complicating factor.     Continue DVT prophylaxis    Discussed with the patient and answered her questions up to her convenience    Discussed with nephrology  Stevie Weiner MD  8/18/2020  5:22 PM

## 2020-08-18 NOTE — PLAN OF CARE
Problem: Falls - Risk of:  Goal: Will remain free from falls  Description: Will remain free from falls  8/18/2020 0231 by Kanchan Rodgers RN  Outcome: Ongoing  Note: Room free of clutter  Hourly rounding   Non-skid socks worn  Side rails up x2  Bed low and locked  Call light in reach  Instructed to call out before getting out of bed  Anticipatory needs met  Bed alarm on  Falling star at the door and on wristband    8/17/2020 1343 by Hannah Batista RN  Outcome: Ongoing  Goal: Absence of physical injury  Description: Absence of physical injury  8/18/2020 0231 by Kanchan Rodgers RN  Outcome: Ongoing  8/17/2020 1343 by Hannah Batista RN  Outcome: Ongoing     Problem: Discharge Planning:  Goal: Discharged to appropriate level of care  Description: Discharged to appropriate level of care  8/18/2020 0231 by Kanchan Rodgers RN  Outcome: Ongoing  8/17/2020 1343 by Hannah Batista RN  Outcome: Ongoing     Problem:  Body Temperature - Imbalanced:  Goal: Ability to maintain a body temperature in the normal range will improve  Description: Ability to maintain a body temperature in the normal range will improve  8/18/2020 0231 by Kanchan Rodgers RN  Outcome: Ongoing  8/17/2020 1343 by Hannah Batista RN  Outcome: Ongoing     Problem: Mobility - Impaired:  Goal: Mobility will improve to maximum level  Description: Mobility will improve to maximum level  8/18/2020 0231 by Kanchan Rodgers RN  Outcome: Ongoing  8/17/2020 1343 by Hannah Batista RN  Outcome: Ongoing     Problem: Pain:  Goal: Pain level will decrease  Description: Pain level will decrease  8/18/2020 0231 by Kanchan Rodgers RN  Outcome: Ongoing  8/17/2020 1343 by Hannah Batista RN  Outcome: Ongoing  Goal: Control of acute pain  Description: Control of acute pain  8/18/2020 0231 by Kanchan Rodgers RN  Outcome: Ongoing  8/17/2020 1343 by Hannah Batista RN  Outcome: Ongoing  Goal: Control of chronic pain  Description: Control of chronic pain  8/18/2020 0231 by Kanchan Rodgers RN  Outcome: Ongoing  8/17/2020 1343

## 2020-08-18 NOTE — PROGRESS NOTES
bilateral lower extremity cellulitis. Current evaluation:2020    /80   Pulse 90   Temp 98.1 °F (36.7 °C) (Oral)   Resp 20   Ht 5' 4\" (1.626 m)   Wt (!) 418 lb (189.6 kg)   SpO2 98%   BMI 71.75 kg/m²     Temperature Range: Temp: 98.1 °F (36.7 °C) Temp  Av.6 °F (37 °C)  Min: 98.1 °F (36.7 °C)  Max: 99 °F (37.2 °C)  The patient is seen and evaluated at bedside she is awake and alert in no acute distress. She does not report any subjective fevers or chills. She did have a PICC line placed in the left upper extremity. She does not have any diarrhea. Review of Systems   Constitutional: Negative. Respiratory: Negative. Cardiovascular: Negative. Gastrointestinal: Negative. Genitourinary: Negative. Musculoskeletal: Negative. Skin: Negative. Neurological: Negative. Psychiatric/Behavioral: Negative. Physical Examination :     Physical Exam  Constitutional:       Appearance: She is obese. HENT:      Head: Normocephalic and atraumatic. Eyes:      General: No scleral icterus. Pupils: Pupils are equal, round, and reactive to light. Neck:      Musculoskeletal: Normal range of motion and neck supple. Cardiovascular:      Rate and Rhythm: Normal rate. Heart sounds: Normal heart sounds. No murmur. Pulmonary:      Effort: Pulmonary effort is normal.      Breath sounds: Normal breath sounds. No wheezing or rales. Abdominal:      General: Bowel sounds are normal.      Palpations: Abdomen is soft. There is no mass. Tenderness: There is no abdominal tenderness. Musculoskeletal: Normal range of motion. General: No deformity. Lymphadenopathy:      Cervical: No cervical adenopathy. Skin:     General: Skin is warm and dry. Findings: Erythema present. No rash. Comments: Cellulitis in the medial thighs bilaterally improved.   Currently left worse than right   Neurological:      Mental Status: She is alert and oriented to person, place, and time. Laboratory data:   I have independently reviewed the followinglabs:  CBC with Differential: No results for input(s): WBC, HGB, HCT, PLT, SEGSPCT, BANDSPCT, LYMPHOPCT, MONOPCT, EOSPCT in the last 72 hours. BMP:   Recent Labs     08/17/20  0505 08/18/20  0557    138   K 3.4* 3.5*    101   CO2 25 24   BUN 15 19   CREATININE 2.92* 4.74*     Hepatic Function Panel: No results for input(s): PROT, LABALBU, BILIDIR, IBILI, BILITOT, ALKPHOS, ALT, AST in the last 72 hours. Recent Labs     08/16/20  1456   VANCOTROUGH 29.8*      Lab Results   Component Value Date    CRP 5.1 (H) 10/13/2017     Lab Results   Component Value Date    SEDRATE 18 06/20/2015       No results for input(s): PROCAL in the last 72 hours. Imaging Studies:   ONE XRAY VIEW OF THE CHEST 8/17/2020 1:39 pm   Impression    Satisfactory right upper extremity PICC placement.         No radiographic evidence of acute cardiopulmonary disease. Cultures:     Culture, Blood 1 [[id number]]   Collected: 08/13/20 1615    Order Status: Completed  Specimen: Blood  Updated: 08/18/20 0022     Specimen Description  . BLOOD     Special Requests  NOT REPORTED     Culture  NO GROWTH 5 DAYS    Culture, Blood 1 [[id number]]   Collected: 08/13/20 1614    Order Status: Completed  Specimen: Blood  Updated: 08/18/20 0022     Specimen Description  . BLOOD     Special Requests  RAC 4ML     Culture  NO GROWTH 5 DAYS        Medications:      heparin (porcine)  5,000 Units Subcutaneous 3 times per day    daptomycin (CUBICIN) IVPB  4 mg/kg (Adjusted) Intravenous Q24H    cefepime  2 g Intravenous Q12H    [Held by provider] bumetanide  1 mg Oral BID    sodium chloride flush  10 mL Intravenous 2 times per day    oxybutynin  15 mg Oral TID           [facility]  Jose Tamayo MD  [employer] messaging  OFFICE: [phone]      Electronically signed by Jose Tamayo MD on 8/18/2020 at 11:39 AM  Thank you for allowing us to participate in the care of this patient. Please call with questions. This note iscreated with the assistance of a speech recognition program.  While intending to generate a document that actually reflects the content of the visit, the document can still have some errors including those of syntax andsound a like substitutions which may escape proof reading. In such instances, actual meaning can be extrapolated by contextual diversion.

## 2020-08-19 LAB
ANION GAP SERPL CALCULATED.3IONS-SCNC: 16 MMOL/L (ref 9–17)
BUN BLDV-MCNC: 24 MG/DL (ref 6–20)
BUN/CREAT BLD: 4 (ref 9–20)
CALCIUM SERPL-MCNC: 8.6 MG/DL (ref 8.6–10.4)
CHLORIDE BLD-SCNC: 103 MMOL/L (ref 98–107)
CO2: 21 MMOL/L (ref 20–31)
CREAT SERPL-MCNC: 5.36 MG/DL (ref 0.5–0.9)
CULTURE: NORMAL
CULTURE: NORMAL
GFR AFRICAN AMERICAN: 10 ML/MIN
GFR NON-AFRICAN AMERICAN: 9 ML/MIN
GFR SERPL CREATININE-BSD FRML MDRD: ABNORMAL ML/MIN/{1.73_M2}
GFR SERPL CREATININE-BSD FRML MDRD: ABNORMAL ML/MIN/{1.73_M2}
GLUCOSE BLD-MCNC: 87 MG/DL (ref 70–99)
Lab: NORMAL
Lab: NORMAL
POTASSIUM SERPL-SCNC: 3.7 MMOL/L (ref 3.7–5.3)
SODIUM BLD-SCNC: 140 MMOL/L (ref 135–144)
SPECIMEN DESCRIPTION: NORMAL
SPECIMEN DESCRIPTION: NORMAL
TOTAL CK: 130 U/L (ref 26–192)

## 2020-08-19 PROCEDURE — 87205 SMEAR GRAM STAIN: CPT

## 2020-08-19 PROCEDURE — 80048 BASIC METABOLIC PNL TOTAL CA: CPT

## 2020-08-19 PROCEDURE — 6370000000 HC RX 637 (ALT 250 FOR IP): Performed by: FAMILY MEDICINE

## 2020-08-19 PROCEDURE — 2580000003 HC RX 258: Performed by: INTERNAL MEDICINE

## 2020-08-19 PROCEDURE — 6360000002 HC RX W HCPCS: Performed by: INTERNAL MEDICINE

## 2020-08-19 PROCEDURE — 99232 SBSQ HOSP IP/OBS MODERATE 35: CPT | Performed by: INTERNAL MEDICINE

## 2020-08-19 PROCEDURE — 2580000003 HC RX 258: Performed by: FAMILY MEDICINE

## 2020-08-19 PROCEDURE — 6360000002 HC RX W HCPCS: Performed by: FAMILY MEDICINE

## 2020-08-19 PROCEDURE — 81001 URINALYSIS AUTO W/SCOPE: CPT

## 2020-08-19 PROCEDURE — 99232 SBSQ HOSP IP/OBS MODERATE 35: CPT | Performed by: FAMILY MEDICINE

## 2020-08-19 PROCEDURE — 1200000000 HC SEMI PRIVATE

## 2020-08-19 PROCEDURE — 82550 ASSAY OF CK (CPK): CPT

## 2020-08-19 PROCEDURE — 36415 COLL VENOUS BLD VENIPUNCTURE: CPT

## 2020-08-19 PROCEDURE — 6370000000 HC RX 637 (ALT 250 FOR IP): Performed by: NURSE PRACTITIONER

## 2020-08-19 RX ORDER — AMLODIPINE BESYLATE 5 MG/1
5 TABLET ORAL DAILY
Status: DISCONTINUED | OUTPATIENT
Start: 2020-08-19 | End: 2020-08-21 | Stop reason: HOSPADM

## 2020-08-19 RX ADMIN — OXYCODONE HYDROCHLORIDE AND ACETAMINOPHEN 1 TABLET: 10; 325 TABLET ORAL at 14:38

## 2020-08-19 RX ADMIN — HEPARIN SODIUM 5000 UNITS: 5000 INJECTION INTRAVENOUS; SUBCUTANEOUS at 05:40

## 2020-08-19 RX ADMIN — SODIUM CHLORIDE: 9 INJECTION, SOLUTION INTRAVENOUS at 08:30

## 2020-08-19 RX ADMIN — CEFEPIME HYDROCHLORIDE 2 G: 2 INJECTION, POWDER, FOR SOLUTION INTRAVENOUS at 14:38

## 2020-08-19 RX ADMIN — HEPARIN SODIUM 5000 UNITS: 5000 INJECTION INTRAVENOUS; SUBCUTANEOUS at 21:20

## 2020-08-19 RX ADMIN — CEFEPIME HYDROCHLORIDE 2 G: 2 INJECTION, POWDER, FOR SOLUTION INTRAVENOUS at 02:45

## 2020-08-19 RX ADMIN — SODIUM CHLORIDE 450 MG: 9 INJECTION, SOLUTION INTRAVENOUS at 11:30

## 2020-08-19 RX ADMIN — OXYCODONE HYDROCHLORIDE AND ACETAMINOPHEN 1 TABLET: 10; 325 TABLET ORAL at 08:19

## 2020-08-19 RX ADMIN — OXYBUTYNIN CHLORIDE 15 MG: 5 TABLET, EXTENDED RELEASE ORAL at 14:38

## 2020-08-19 RX ADMIN — OXYBUTYNIN CHLORIDE 15 MG: 5 TABLET, EXTENDED RELEASE ORAL at 21:20

## 2020-08-19 RX ADMIN — OXYBUTYNIN CHLORIDE 15 MG: 5 TABLET, EXTENDED RELEASE ORAL at 08:19

## 2020-08-19 RX ADMIN — HEPARIN SODIUM 5000 UNITS: 5000 INJECTION INTRAVENOUS; SUBCUTANEOUS at 14:38

## 2020-08-19 RX ADMIN — OXYCODONE HYDROCHLORIDE AND ACETAMINOPHEN 1 TABLET: 10; 325 TABLET ORAL at 21:20

## 2020-08-19 RX ADMIN — AMLODIPINE BESYLATE 5 MG: 5 TABLET ORAL at 11:39

## 2020-08-19 ASSESSMENT — ENCOUNTER SYMPTOMS
CHEST TIGHTNESS: 0
NAUSEA: 0
CONSTIPATION: 0
VOMITING: 0
ABDOMINAL PAIN: 0
COUGH: 0
GASTROINTESTINAL NEGATIVE: 1
RESPIRATORY NEGATIVE: 1
DIARRHEA: 0
BLOOD IN STOOL: 0
SHORTNESS OF BREATH: 0
WHEEZING: 0

## 2020-08-19 ASSESSMENT — PAIN DESCRIPTION - LOCATION: LOCATION: LEG

## 2020-08-19 ASSESSMENT — PAIN DESCRIPTION - ORIENTATION: ORIENTATION: RIGHT;LEFT

## 2020-08-19 ASSESSMENT — PAIN SCALES - GENERAL
PAINLEVEL_OUTOF10: 10
PAINLEVEL_OUTOF10: 9
PAINLEVEL_OUTOF10: 7
PAINLEVEL_OUTOF10: 9
PAINLEVEL_OUTOF10: 0

## 2020-08-19 ASSESSMENT — PAIN DESCRIPTION - PAIN TYPE: TYPE: ACUTE PAIN

## 2020-08-19 NOTE — PROGRESS NOTES
[facility]    Progress Note    8/19/2020    10:41 AM    Name:   Rena Mirza  MRN:     [id number]     Acct:      [de-identified]   Room:   2005/2005-01  IP Day:  6  Admit Date:  8/13/2020  2:12 PM    PCP:   Landy Miller MD  Code Status:  Full Code    Subjective:     C/C:   Chief Complaint   Patient presents with    Cellulitis     L leg     Interval History Status: worsened. Patient seen and examined at bedside, no acute events overnight. Her cellulitis is improving by her creatinine significantly worse, she is still producing good amount of urine   Her creatinine continue to worsen, no acidosis or hyperkalemia  Patient denies any chest pain, shortness of breath, chills, fevers, nausea or vomiting. Patient vitals, labs and all providers notes were reviewed,from overnight shift and morning updates were noted and discussed with the nurse    Brief History:     Per my associate  Rena Mirza is 51 y.o. female  Who was admitted to the hospital on 8/13/2020 for treatment of Cellulitis of anterior lower leg. Patient was brought to emergency room by her sister for bilateral lower extremity Swelling, redness, pain.  Patient has known history of lymphedema, morbid obesity. Binu Kramer has been struggling with lower extremity swelling for last 2 to 3 months.  She is homebound and has been visited by her PCP for home visits. Binu Kramer was treated with multiple courses of antibiotics and last 2 months.  Patient also has scheduled appointment with lymphedema clinic. Binu Kramer noted redness and left leg around the knee and inner thigh associated with pain.  Patient also reported some drainage in last week.  Patient also complains of swelling and pain in right inner thigh.  She denies any fevers but has been having chills.   Evaluation emergency room showed temperature 98.5, blood pressure 152/109, BMI 71.4 lab evaluation showed normal electrolytes, kidney function.   Patient reports that she has difficulty in ambulation due to hip fracture and knee arthritis.  She is on long-term diuretics Bumex and Lasix.  Patient had gastric banding before. Patient was started on empiric IV vancomycin, Zosyn. Patient had mild improvement in cellulitis however developed progressive declining kidney function. Review of Systems:     Review of Systems   Constitutional: Positive for fatigue. Negative for chills, diaphoresis and fever. HENT: Negative for congestion. Eyes: Negative for visual disturbance. Respiratory: Negative for cough, chest tightness, shortness of breath and wheezing. Cardiovascular: Negative for chest pain, palpitations and leg swelling. Gastrointestinal: Negative for abdominal pain, blood in stool, constipation, diarrhea, nausea and vomiting. Genitourinary: Negative for difficulty urinating. Neurological: Negative for dizziness, weakness, light-headedness, numbness and headaches. All other systems reviewed and are negative. Medications: Allergies:     Allergies   Allergen Reactions    Clindamycin/Lincomycin     Estrogens     Fish-Derived Products     Glucophage [Metformin Hcl]     Ibuprofen     Iodine     Lisinopril     Medrol [Methylprednisolone]      itching    Metformin And Related     Morphine Itching    Nsaids      Pt has had a gastric banding       Current Meds:   Scheduled Meds:    amLODIPine  5 mg Oral Daily    heparin (porcine)  5,000 Units Subcutaneous 3 times per day    daptomycin (CUBICIN) IVPB  4 mg/kg (Adjusted) Intravenous Q24H    cefepime  2 g Intravenous Q12H    [Held by provider] bumetanide  1 mg Oral BID    sodium chloride flush  10 mL Intravenous 2 times per day    oxybutynin  15 mg Oral TID     Continuous Infusions:    sodium chloride 75 mL/hr at 08/19/20 0830     PRN Meds: oxyCODONE-acetaminophen, sodium chloride flush, acetaminophen **OR** acetaminophen    Data:     Past Medical History:   has a past medical history of Anxiety, Asthma, Cellulitis, Headache(784.0), Hypertension, and Lymphedema. Social History:   reports that she has never smoked. She has never used smokeless tobacco. She reports current alcohol use. She reports that she does not use drugs. Family History:   Family History   Problem Relation Age of Onset    Thyroid Disease Mother     Asthma Mother     Heart Failure Mother     COPD Mother     Heart Attack Father     Diabetes Father     Stroke Father        Vitals:  BP (!) 148/83   Pulse 98   Temp 99.1 °F (37.3 °C) (Oral)   Resp 18   Ht 5' 4\" (1.626 m)   Wt (!) 424 lb (192.3 kg)   SpO2 95%   BMI 72.78 kg/m²   Temp (24hrs), Av °F (37.2 °C), Min:98.4 °F (36.9 °C), Max:99.3 °F (37.4 °C)    No results for input(s): POCGLU in the last 72 hours. I/O (24Hr): Intake/Output Summary (Last 24 hours) at 2020 1041  Last data filed at 2020 0953  Gross per 24 hour   Intake --   Output 2250 ml   Net -2250 ml       Labs:  Hematology:No results for input(s): WBC, RBC, HGB, HCT, MCV, MCH, MCHC, RDW, PLT, MPV, SEDRATE, CRP, INR, DDIMER, VA0TGMVU, LABABSO in the last 72 hours. Invalid input(s): PT  Chemistry:  Recent Labs     20  0505 20  0919 20  0557 20  0630     --  138 140   K 3.4*  --  3.5* 3.7     --  101 103   CO2 25  --  24 21   GLUCOSE 98  --  99 87   BUN 15  --  19 24*   CREATININE 2.92*  --  4.74* 5.36*   ANIONGAP 12  --  13 16   LABGLOM 17*  --  10* 9*   GFRAA 21*  --  12* 10*   CALCIUM 8.6  --  8.5* 8.6   CKTOTAL  --  300*  --  130   No results for input(s): PROT, LABALBU, LABA1C, B9HJLKV, T3BPCDL, FT4, TSH, AST, ALT, LDH, GGT, ALKPHOS, LABGGT, BILITOT, BILIDIR, AMMONIA, AMYLASE, LIPASE, LACTATE, CHOL, HDL, LDLCHOLESTEROL, CHOLHDLRATIO, TRIG, VLDL, ANU30BS, PHENYTOIN, PHENYF, URICACID, POCGLU in the last 72 hours.   ABG:No results found for: POCPH, PHART, PH, POCPCO2, EYR2UUD, PCO2, POCPO2, PO2ART, PO2, POCHCO3, RTJ8NZQ, HCO3, NBEA, PBEA, BEART, BE, THGBART, THB, AXL0GUL, NOAL8KTJ, S1NEHZIS, O2SAT, FIO2  Lab Results   Component Value Date/Time    SPECIAL NOT REPORTED 08/13/2020 04:15 PM     Lab Results   Component Value Date/Time    CULTURE NO GROWTH 6 DAYS 08/13/2020 04:15 PM       Radiology:  Us Renal Complete    Result Date: 8/17/2020  Negative ultrasound of the kidneys and urinary bladder. Xr Chest Portable    Result Date: 8/17/2020  Satisfactory right upper extremity PICC placement. No radiographic evidence of acute cardiopulmonary disease. Ir Insert Picc Vad W Sq Port >5 Years    Result Date: 8/17/2020  Successful ultrasound guided right upper extremity double lumen PICC placement. Physical Examination:        Physical Exam  Vitals signs and nursing note reviewed. Constitutional:       General: She is not in acute distress. HENT:      Head: Normocephalic and atraumatic. Eyes:      Conjunctiva/sclera: Conjunctivae normal.      Pupils: Pupils are equal, round, and reactive to light. Cardiovascular:      Rate and Rhythm: Normal rate and regular rhythm. Heart sounds: No murmur. Pulmonary:      Effort: Pulmonary effort is normal. No accessory muscle usage or respiratory distress. Breath sounds: No stridor. No decreased breath sounds, wheezing, rhonchi or rales. Abdominal:      General: Bowel sounds are normal. There is no distension. Palpations: Abdomen is soft. Abdomen is not rigid. Tenderness: There is no abdominal tenderness. There is no guarding. Musculoskeletal:         General: No tenderness. Right lower leg: Edema present. Left lower leg: Edema present. Comments: Bilateral lower extremity and Ace wrap,  medial thighs swelling and erythema are less   Skin:     General: Skin is warm and dry. Findings: No erythema, lesion or rash. Neurological:      Mental Status: She is alert and oriented to person, place, and time. Cranial Nerves: No cranial nerve deficit.

## 2020-08-19 NOTE — PLAN OF CARE
Nutrition Problem #1: Increased nutrient needs  Intervention: Food and/or Nutrient Delivery: Continue Current Diet, Discontinue Oral Nutrition Supplement  Nutritional Goals: PO intake to meet > 75% energy and protien needs; wound healing

## 2020-08-19 NOTE — PROGRESS NOTES
Dr. Karen Silva notified of critical lab level of Creatinine- 5.36 this AM.  No new orders per Dr. Karen Silva at this time. Will continue to monitor.

## 2020-08-19 NOTE — PLAN OF CARE
Problem: Falls - Risk of:  Goal: Will remain free from falls  Description: Will remain free from falls  8/19/2020 1105 by Fer Duvall RN  Outcome: Ongoing  Note: Room free of clutter  Hourly rounding   Non-skid socks worn  Side rails up x2  Bed low and locked  Call light in reach  Instructed to call out before getting out of bed  Anticipatory needs met  Bed alarm on  Falling star at the door and on wristband       Problem: Pain:  Goal: Pain level will decrease  Description: Pain level will decrease  8/19/2020 1105 by Fer Duvall RN  Outcome: Ongoing  Note: Pain level assessed and rated on a 0-10 scale  Assess characteristics of pain  PRN pain medication given per pt request  Non-pharmacological interventions implemented  Report ineffective pain management to physician  Update pt and family of any changes  Pt instructed to call out with new onset of pain  Continue to monitor

## 2020-08-19 NOTE — PROGRESS NOTES
NEPHROLOGY progress note    Patient :  Kanchan Liz; 51 y.o. MRN# [id number]  Location:  2005/2005-01  Attending:  Krystin Nelson MD  Admit Date:  8/13/2020   Hospital Day: 6      Reason for Consult: CARLEY      Chief Complaint: Bilateral upper thigh cellulitis  History Obtained From:  Patient    History of Present Illness: This is a 51 y.o. female and past medical history of essential hypertension, lymphedema presented to the hospital on 8/13/2020 with complaints of bilateral upper thigh pain and erythema, she has been having swelling of the legs for last 3 months and has been treated for cellulitis the symptoms started this time on Thursday when she developed pain on the bilateral upper thigh. Patient denied any fever but complained of some chills no nausea vomiting diarrhea  Patient was admitted for cellulitis, patient was treated with IV vancomycin. Serum creatinine on admission was 0.8 mg/dL, been stable till yesterday 8/16/20 serum creatinine was 1.1 mg/dL which increased to 2.9 mg/dL this morning and therefore nephrology consultation was requested. Vancomycin trough level was noted to be 29.8 yesterday 8/16/2020. No IV contrast was used  No hypotension was noted  Patient takes naproxen at home. Patient denies dysuria, gross hematuria, flank pain, nocturia, urgency, passing frothy urine or urinary incontinence. There has been no recent exposure to IV contrast.   There is no history  of paraprotein disease. Pt denies any history of recurrent UTI or kidney stones. Medication review shows use of diuretics. Subjective/interval history:    Seen and examined, complains of pain which is controlled with pain meds.   Serum creatinine continues to rise increased to 4.7 mg/dL from 2.9 mg/dL in 24 hours  Patient is nonoliguric nonoliguric urine output 1700 mL yesterday in 24 hours  Nausea vomiting no tremors no confusion no uremic signs  Past Medical History:        Diagnosis Date    Anxiety     Asthma     Cellulitis     Headache(784.0)     Hypertension     Lymphedema        Past Surgical History:        Procedure Laterality Date    GASTRIC BAND      IR INS PICC VAD W SQ PORT GREATER THAN 5  2020    IR INS PICC VAD W SQ PORT GREATER THAN 5 2020 [facility]       Current Medications:    amLODIPine (NORVASC) tablet 5 mg, Daily  heparin (porcine) injection 5,000 Units, 3 times per day  0.9 % sodium chloride infusion, Continuous  DAPTOmycin (CUBICIN) 450 mg in sodium chloride 0.9 % 50 mL IVPB, Q24H  cefepime (MAXIPIME) 2 g IVPB minibag, Q12H  [Held by provider] bumetanide (BUMEX) tablet 1 mg, BID  oxyCODONE-acetaminophen (PERCOCET)  MG per tablet 1 tablet, Q4H PRN  sodium chloride flush 0.9 % injection 10 mL, 2 times per day  sodium chloride flush 0.9 % injection 10 mL, PRN  acetaminophen (TYLENOL) tablet 650 mg, Q6H PRN    Or  acetaminophen (TYLENOL) suppository 650 mg, Q6H PRN  oxybutynin (DITROPAN-XL) extended release tablet 15 mg, TID        Allergies:  Clindamycin/lincomycin; Estrogens; Fish-derived products; Glucophage [metformin hcl]; Ibuprofen; Iodine; Lisinopril; Medrol [methylprednisolone];  Metformin and related; Morphine; and Nsaids          Objective:  CURRENT TEMPERATURE:  Temp: 98.2 °F (36.8 °C)  MAXIMUM TEMPERATURE OVER 24HRS:  Temp (24hrs), Av.8 °F (37.1 °C), Min:98.2 °F (36.8 °C), Max:99.3 °F (37.4 °C)    CURRENT RESPIRATORY RATE:  Resp: 18  CURRENT PULSE:  Pulse: 91  CURRENT BLOOD PRESSURE:  BP: 136/81  24HR BLOOD PRESSURE RANGE:  Systolic (67RIQ), NTX:271 , Min:133 , DQW:824   ; Diastolic (30JDE), YRD:70, Min:72, Max:90    24HR INTAKE/OUTPUT:      Intake/Output Summary (Last 24 hours) at 2020 1233  Last data filed at 2020 0953  Gross per 24 hour   Intake --   Output 2250 ml   Net -2250 ml     Patient Vitals for the past 96 hrs (Last 3 readings):   Weight   20 0449 (!) 424 lb (192.3 kg)   20 0427 (!) 418 lb (189.6 kg)   20 72 hours. Urine Creatinine:    Recent Labs     08/17/20  0929   LABCREA 46.7         Radiology:  Reviewed as available. Assessment:  Admitted with bilateral lower extremity upper thigh cellulitis, gated by acute kidney injury    1. CARLEY nonoliguric serum creatinine increased from 1.1 mg/dL8/16/2020 increased to 2.9 mg/dL--->4.7 MG/DL-->5.3 differential initial diagnosis include vancomycin nephrotoxicity ,          No use of IV contrast noted,  renal ultrasound unremarkable no hydronephrosis. 2. Hypokalemia  3. Essential hypertension         Plan:  Continue IVF Normal saline 50 ml/hr   vancomycin discontinued, continue daptomycin, continue cefepime. Discussed with the patient again regarding worsening kidney function and need for dialysis. Patient c/o Nausea, and bad taste, which I explained her it could be sign of uremia/renal failure, I offered to initiate dialysis but Patient want to wait for another day. Continue to monitor closely. Thank you for the consultation.       Electronically signed by [facility], MD on 8/19/2020 at 12:33 PM

## 2020-08-19 NOTE — PROGRESS NOTES
[facility]  Progress Note    Sujatha Paredes  MRN: [id number]  Date: 8/19/2020  LOS: 6     Reason for F/U :   Bilateral thigh cellulitis    Impression :   1. Morbid obesity   · status post gastric banding  2. Bilateral lower extremity lymphedema  3. Bilateral medial thigh cellulitis  4. Acute kidney injury    Recommendations:   · Continue on cefepime and daptomycin for now but the doses have been adjusted for the renal insufficiency. · The CPK levels were elevated yesterday but today's level is within normal limits  · The client with a creatinine starting to decrease showing the creatinine is starting to reach a peak  · I will order a urinalysis as well as urine for urine samples    Infection Control Recommendations:   Contact precautions    Discharge Planning:   Estimated Length of IV antimicrobials: 8/27/2020  Patient will need PICC line Insertion  Patient will need: Home IV   Patient willneed outpatient wound care: No    Medical Decision making / Summary of Stay:   Sujatha Paredes is a 51y.o.-year-old female who was initially admitted on 8/13/2020. Carmen Salvador has a history of morbid obesity, bilateral lower extremity lymphedema and has had chronic swelling in the lower extremities for 2 to 3 months. The patient is homebound/bedbound due to history of a prior hip fracture, arthritis and the obesity. She is also on long-term diuretic therapy with Bumex and Lasix. She has a history of gastric banding in the past .  The patient has had swelling/pain/erythema in the upper thighs that has been worsening. He has been on multiple different antibiotics over the last few months but the symptoms have not resolved. The patient is also had some areas with drainage and due to the worsening symptoms without improvement the patient was brought into the emergency room for evaluation. She reports occasional chills but no subjective fevers.   No nausea vomiting or diarrhea.     The patient was admitted started on Zosyn and vancomycin with leg elevation  I was asked to evaluate for the bilateral lower extremity cellulitis. Current evaluation:2020    /81   Pulse 91   Temp 98.2 °F (36.8 °C) (Oral)   Resp 18   Ht 5' 4\" (1.626 m)   Wt (!) 424 lb (192.3 kg)   SpO2 95%   BMI 72.78 kg/m²     Temperature Range: Temp: 98.2 °F (36.8 °C) Temp  Av.8 °F (37.1 °C)  Min: 98.2 °F (36.8 °C)  Max: 99.3 °F (37.4 °C)  The patient is seen and evaluated at bedside and she is awake and alert in no acute distress  No subjective fevers or chills. No abdominal pain nausea vomiting or diarrhea. Review of Systems   Constitutional: Negative. Respiratory: Negative. Cardiovascular: Negative. Gastrointestinal: Negative. Genitourinary: Negative. Musculoskeletal: Negative. Skin: Negative. Neurological: Negative. Psychiatric/Behavioral: Negative. Physical Examination :     Physical Exam  Constitutional:       Appearance: She is obese. HENT:      Head: Normocephalic and atraumatic. Eyes:      General: No scleral icterus. Pupils: Pupils are equal, round, and reactive to light. Neck:      Musculoskeletal: Normal range of motion and neck supple. Cardiovascular:      Rate and Rhythm: Normal rate. Heart sounds: Normal heart sounds. No murmur. Pulmonary:      Effort: Pulmonary effort is normal.      Breath sounds: Normal breath sounds. No wheezing or rales. Abdominal:      General: Bowel sounds are normal.      Palpations: Abdomen is soft. There is no mass. Tenderness: There is no abdominal tenderness. Musculoskeletal: Normal range of motion. General: No deformity. Lymphadenopathy:      Cervical: No cervical adenopathy. Skin:     General: Skin is warm and dry. Findings: Erythema present. No rash. Comments: Cellulitis in the medial thighs bilaterally improved.   Currently left worse than right   Neurological:      Mental Status: She is alert and oriented to person, place, and time. Laboratory data:   I have independently reviewed the followinglabs:  CBC with Differential: No results for input(s): WBC, HGB, HCT, PLT, SEGSPCT, BANDSPCT, LYMPHOPCT, MONOPCT, EOSPCT in the last 72 hours. BMP:   Recent Labs     08/18/20  0557 08/19/20  0630    140   K 3.5* 3.7    103   CO2 24 21   BUN 19 24*   CREATININE 4.74* 5.36*     Hepatic Function Panel: No results for input(s): PROT, LABALBU, BILIDIR, IBILI, BILITOT, ALKPHOS, ALT, AST in the last 72 hours. No results for input(s): VANCOTROUGH in the last 72 hours. Lab Results   Component Value Date    CRP 5.1 (H) 10/13/2017     Lab Results   Component Value Date    SEDRATE 18 06/20/2015       No results for input(s): PROCAL in the last 72 hours. Total CK  130  26 - 192 U/L  Final  08/19/2020  6:30 AM  [address] [facility]      Total CK  300High    26 - 192 U/L  Final  08/17/2020  9:19 AM  - [facility]        Imaging Studies:   ONE XRAY VIEW OF THE CHEST 8/17/2020 1:39 pm   Impression    Satisfactory right upper extremity PICC placement.         No radiographic evidence of acute cardiopulmonary disease. Cultures:     Culture, Blood 1 [[id number]]   Collected: 08/13/20 1615    Order Status: Completed  Specimen: Blood  Updated: 08/19/20 0732     Specimen Description  . BLOOD     Special Requests  NOT REPORTED     Culture  NO GROWTH 6 DAYS    Culture, Blood 1 [[id number]]   Collected: 08/13/20 1614    Order Status: Completed  Specimen: Blood  Updated: 08/19/20 0732     Specimen Description  . BLOOD     Special Requests  RAC 4ML     Culture  NO GROWTH 6 DAYS        Medications:      amLODIPine  5 mg Oral Daily    heparin (porcine)  5,000 Units Subcutaneous 3 times per day    daptomycin (CUBICIN) IVPB  4 mg/kg (Adjusted) Intravenous Q24H    cefepime  2 g Intravenous Q12H    [Held by provider] bumetanide  1 mg Oral BID    sodium chloride flush  10 mL Intravenous 2 times per day    oxybutynin  15 mg Oral TID           [facility]  Margo Galicia MD  Perfect [employer] messaging  OFFICE: [phone]      Electronically signed by Margo Galicia MD on 8/19/2020 at 3:02 PM  Thank you for allowing us to participate in the care of this patient. Please call with questions. This note iscreated with the assistance of a speech recognition program.  While intending to generate a document that actually reflects the content of the visit, the document can still have some errors including those of syntax andsound a like substitutions which may escape proof reading. In such instances, actual meaning can be extrapolated by contextual diversion.

## 2020-08-19 NOTE — PROGRESS NOTES
Comprehensive Nutrition Assessment    Type and Reason for Visit:  Reassess    Nutrition Recommendations/Plan: Continue Low Sodium, 1500 ml fluid restriction. Discontinue Ovidio wound healing supplement. Patient does not like supplement. Nutrition Assessment:  Patient reports she is eating more than 50% at meals. Patient does not like Zigmund Sever and would like the supplement to be discontinued. Patient kidney function is declining and she may need hemodialysis. Lower extemities continue to be very swollen. Continue current diet. Monitor p.o intakes and labs. Malnutrition Assessment:  Malnutrition Status:  No malnutrition        Estimated Daily Nutrient Needs:  Energy (kcal):  7617-1050 kcal; Weight Used for Energy Requirements:  Admission     Protein (g):  109 g (2 g/kg IBW); Weight Used for Protein Requirements:  Ideal        Fluid (ml/day):  1 mL/kcal; Weight Used for Fluid Requirements:  Admission      Nutrition Related Findings:  Edema: +4 pitting BLE; Wounds (serous drainage) BLE      Wounds:  Multiple       Current Nutrition Therapies:    DIET GENERAL; Low Sodium (2 GM);  Daily Fluid Restriction: 1500 ml    Anthropometric Measures:  · Height: 5' 4\" (162.6 cm)  · Current Body Weight: 424 lb (192.3 kg)   · Admission Body Weight: 414 lb (187.8 kg)    · Usual Body Weight: 414 lb (187.8 kg)     · Ideal Body Weight: 120 lbs; % Ideal Body Weight 353.3 %   · BMI: 72.7  · BMI Categories: Obese Class 3 (BMI 40.0 or greater)       Nutrition Diagnosis:   · Increased nutrient needs related to other (comment), increase demand for energy/nutrients(wound healing) as evidenced by wounds, localized or generalized fluid accumulation      Nutrition Interventions:   Food and/or Nutrient Delivery:  Continue Current Diet, Discontinue Oral Nutrition Supplement  Nutrition Education/Counseling:  Education not indicated   Coordination of Nutrition Care:  Continued Inpatient Monitoring    Goals:  PO intake to meet > 75% energy and protien needs; wound healing       Nutrition Monitoring and Evaluation:   Food/Nutrient Intake Outcomes:  Food and Nutrient Intake  Physical Signs/Symptoms Outcomes:  Biochemical Data, Skin, Weight, Fluid Status or Edema     Discharge Planning:    Continue current diet     Some areas of assessment maybe incomplete due to COVID-19 precautions.     Andria VELASQUEZN, RDN, LDN  Lead Clinical Dietitian  RD Office Phone [phone]

## 2020-08-19 NOTE — PLAN OF CARE
Problem: Falls - Risk of:  Goal: Will remain free from falls  Description: Will remain free from falls  Outcome: Ongoing  Goal: Absence of physical injury  Description: Absence of physical injury  Outcome: Ongoing     Problem: Discharge Planning:  Goal: Discharged to appropriate level of care  Description: Discharged to appropriate level of care  Outcome: Ongoing     Problem:  Body Temperature - Imbalanced:  Goal: Ability to maintain a body temperature in the normal range will improve  Description: Ability to maintain a body temperature in the normal range will improve  Outcome: Ongoing     Problem: Mobility - Impaired:  Goal: Mobility will improve to maximum level  Description: Mobility will improve to maximum level  Outcome: Ongoing     Problem: Pain:  Goal: Pain level will decrease  Description: Pain level will decrease  Outcome: Ongoing  Goal: Control of acute pain  Description: Control of acute pain  Outcome: Ongoing  Goal: Control of chronic pain  Description: Control of chronic pain  Outcome: Ongoing     Problem: Skin Integrity - Impaired:  Goal: Will show no infection signs and symptoms  Description: Will show no infection signs and symptoms  Outcome: Ongoing  Goal: Absence of new skin breakdown  Description: Absence of new skin breakdown  Outcome: Ongoing     Problem: Pain:  Goal: Pain level will decrease  Description: Pain level will decrease  Outcome: Ongoing  Goal: Control of acute pain  Description: Control of acute pain  Outcome: Ongoing  Goal: Control of chronic pain  Description: Control of chronic pain  Outcome: Ongoing     Problem: Nutrition  Goal: Optimal nutrition therapy  Outcome: Ongoing     Problem: IP MOBILITY  Goal: LTG - patient will ambulate community distance  Outcome: Ongoing  Goal: LTG - Patient will navigate 4-6 steps with rails/device  Outcome: Ongoing     Problem: Skin Integrity:  Goal: Will show no infection signs and symptoms  Description: Will show no infection signs and

## 2020-08-20 LAB
-: ABNORMAL
AMORPHOUS: ABNORMAL
ANION GAP SERPL CALCULATED.3IONS-SCNC: 14 MMOL/L (ref 9–17)
BACTERIA: ABNORMAL
BILIRUBIN URINE: NEGATIVE
BUN BLDV-MCNC: 27 MG/DL (ref 6–20)
BUN/CREAT BLD: 5 (ref 9–20)
CALCIUM SERPL-MCNC: 8.7 MG/DL (ref 8.6–10.4)
CASTS UA: ABNORMAL /LPF
CHLORIDE BLD-SCNC: 105 MMOL/L (ref 98–107)
CO2: 22 MMOL/L (ref 20–31)
COLOR: YELLOW
COMMENT UA: ABNORMAL
CREAT SERPL-MCNC: 5.43 MG/DL (ref 0.5–0.9)
CRYSTALS, UA: ABNORMAL /HPF
EOSINOPHIL,URINE: NORMAL
EPITHELIAL CELLS UA: ABNORMAL /HPF (ref 0–5)
GFR AFRICAN AMERICAN: 10 ML/MIN
GFR NON-AFRICAN AMERICAN: 8 ML/MIN
GFR SERPL CREATININE-BSD FRML MDRD: ABNORMAL ML/MIN/{1.73_M2}
GFR SERPL CREATININE-BSD FRML MDRD: ABNORMAL ML/MIN/{1.73_M2}
GLUCOSE BLD-MCNC: 86 MG/DL (ref 70–99)
GLUCOSE URINE: NEGATIVE
KETONES, URINE: NEGATIVE
LEUKOCYTE ESTERASE, URINE: ABNORMAL
MUCUS: ABNORMAL
NITRITE, URINE: NEGATIVE
OTHER OBSERVATIONS UA: ABNORMAL
PH UA: 6 (ref 5–8)
POTASSIUM SERPL-SCNC: 3.7 MMOL/L (ref 3.7–5.3)
PROTEIN UA: ABNORMAL
RBC UA: ABNORMAL /HPF (ref 0–2)
RENAL EPITHELIAL, UA: ABNORMAL /HPF
SODIUM BLD-SCNC: 141 MMOL/L (ref 135–144)
SPECIFIC GRAVITY UA: 1.01 (ref 1–1.03)
TRICHOMONAS: ABNORMAL
TURBIDITY: ABNORMAL
URINE HGB: ABNORMAL
UROBILINOGEN, URINE: NORMAL
WBC UA: ABNORMAL /HPF (ref 0–5)
YEAST: ABNORMAL

## 2020-08-20 PROCEDURE — 2580000003 HC RX 258: Performed by: FAMILY MEDICINE

## 2020-08-20 PROCEDURE — 99232 SBSQ HOSP IP/OBS MODERATE 35: CPT | Performed by: FAMILY MEDICINE

## 2020-08-20 PROCEDURE — 36415 COLL VENOUS BLD VENIPUNCTURE: CPT

## 2020-08-20 PROCEDURE — 1200000000 HC SEMI PRIVATE

## 2020-08-20 PROCEDURE — 97116 GAIT TRAINING THERAPY: CPT

## 2020-08-20 PROCEDURE — 2580000003 HC RX 258: Performed by: INTERNAL MEDICINE

## 2020-08-20 PROCEDURE — 80048 BASIC METABOLIC PNL TOTAL CA: CPT

## 2020-08-20 PROCEDURE — 6360000002 HC RX W HCPCS: Performed by: INTERNAL MEDICINE

## 2020-08-20 PROCEDURE — 6370000000 HC RX 637 (ALT 250 FOR IP): Performed by: NURSE PRACTITIONER

## 2020-08-20 PROCEDURE — 6360000002 HC RX W HCPCS: Performed by: FAMILY MEDICINE

## 2020-08-20 PROCEDURE — 6370000000 HC RX 637 (ALT 250 FOR IP): Performed by: FAMILY MEDICINE

## 2020-08-20 PROCEDURE — 97530 THERAPEUTIC ACTIVITIES: CPT

## 2020-08-20 PROCEDURE — 99232 SBSQ HOSP IP/OBS MODERATE 35: CPT | Performed by: INTERNAL MEDICINE

## 2020-08-20 RX ORDER — ONDANSETRON 2 MG/ML
4 INJECTION INTRAMUSCULAR; INTRAVENOUS EVERY 6 HOURS PRN
Status: DISCONTINUED | OUTPATIENT
Start: 2020-08-20 | End: 2020-08-21 | Stop reason: HOSPADM

## 2020-08-20 RX ORDER — DIPHENHYDRAMINE HCL 25 MG
25 TABLET ORAL EVERY 6 HOURS PRN
Status: DISCONTINUED | OUTPATIENT
Start: 2020-08-20 | End: 2020-08-21 | Stop reason: HOSPADM

## 2020-08-20 RX ADMIN — CEFEPIME HYDROCHLORIDE 1 G: 1 INJECTION, POWDER, FOR SOLUTION INTRAMUSCULAR; INTRAVENOUS at 01:30

## 2020-08-20 RX ADMIN — OXYCODONE HYDROCHLORIDE AND ACETAMINOPHEN 1 TABLET: 10; 325 TABLET ORAL at 13:42

## 2020-08-20 RX ADMIN — OXYCODONE HYDROCHLORIDE AND ACETAMINOPHEN 1 TABLET: 10; 325 TABLET ORAL at 06:15

## 2020-08-20 RX ADMIN — OXYBUTYNIN CHLORIDE 15 MG: 5 TABLET, EXTENDED RELEASE ORAL at 14:08

## 2020-08-20 RX ADMIN — OXYCODONE HYDROCHLORIDE AND ACETAMINOPHEN 1 TABLET: 10; 325 TABLET ORAL at 20:54

## 2020-08-20 RX ADMIN — AMLODIPINE BESYLATE 5 MG: 5 TABLET ORAL at 10:00

## 2020-08-20 RX ADMIN — SODIUM CHLORIDE: 9 INJECTION, SOLUTION INTRAVENOUS at 06:16

## 2020-08-20 RX ADMIN — ONDANSETRON 4 MG: 2 INJECTION INTRAMUSCULAR; INTRAVENOUS at 14:08

## 2020-08-20 RX ADMIN — CEFEPIME HYDROCHLORIDE 1 G: 1 INJECTION, POWDER, FOR SOLUTION INTRAMUSCULAR; INTRAVENOUS at 14:08

## 2020-08-20 RX ADMIN — OXYCODONE HYDROCHLORIDE AND ACETAMINOPHEN 1 TABLET: 10; 325 TABLET ORAL at 01:37

## 2020-08-20 RX ADMIN — DIPHENHYDRAMINE HCL 25 MG: 25 TABLET ORAL at 01:29

## 2020-08-20 RX ADMIN — DIPHENHYDRAMINE HCL 25 MG: 25 TABLET ORAL at 08:15

## 2020-08-20 RX ADMIN — Medication 10 ML: at 20:56

## 2020-08-20 RX ADMIN — HEPARIN SODIUM 5000 UNITS: 5000 INJECTION INTRAVENOUS; SUBCUTANEOUS at 14:08

## 2020-08-20 RX ADMIN — SODIUM CHLORIDE: 9 INJECTION, SOLUTION INTRAVENOUS at 20:56

## 2020-08-20 RX ADMIN — OXYBUTYNIN CHLORIDE 15 MG: 5 TABLET, EXTENDED RELEASE ORAL at 20:54

## 2020-08-20 RX ADMIN — HEPARIN SODIUM 5000 UNITS: 5000 INJECTION INTRAVENOUS; SUBCUTANEOUS at 06:16

## 2020-08-20 RX ADMIN — OXYBUTYNIN CHLORIDE 15 MG: 5 TABLET, EXTENDED RELEASE ORAL at 10:00

## 2020-08-20 RX ADMIN — HEPARIN SODIUM 5000 UNITS: 5000 INJECTION INTRAVENOUS; SUBCUTANEOUS at 21:50

## 2020-08-20 ASSESSMENT — ENCOUNTER SYMPTOMS
GASTROINTESTINAL NEGATIVE: 1
BLOOD IN STOOL: 0
ABDOMINAL PAIN: 0
WHEEZING: 0
DIARRHEA: 0
RESPIRATORY NEGATIVE: 1
NAUSEA: 1
CHEST TIGHTNESS: 0
SHORTNESS OF BREATH: 0
CONSTIPATION: 0
COUGH: 0
VOMITING: 0

## 2020-08-20 ASSESSMENT — PAIN DESCRIPTION - FREQUENCY: FREQUENCY: INTERMITTENT

## 2020-08-20 ASSESSMENT — PAIN DESCRIPTION - ORIENTATION: ORIENTATION: RIGHT;LEFT

## 2020-08-20 ASSESSMENT — PAIN SCALES - GENERAL
PAINLEVEL_OUTOF10: 4
PAINLEVEL_OUTOF10: 9
PAINLEVEL_OUTOF10: 8
PAINLEVEL_OUTOF10: 6
PAINLEVEL_OUTOF10: 8
PAINLEVEL_OUTOF10: 0
PAINLEVEL_OUTOF10: 8

## 2020-08-20 ASSESSMENT — PAIN DESCRIPTION - PAIN TYPE: TYPE: ACUTE PAIN

## 2020-08-20 ASSESSMENT — PAIN DESCRIPTION - DESCRIPTORS: DESCRIPTORS: BURNING;ACHING

## 2020-08-20 ASSESSMENT — PAIN DESCRIPTION - LOCATION: LOCATION: LEG

## 2020-08-20 ASSESSMENT — PAIN DESCRIPTION - ONSET: ONSET: GRADUAL

## 2020-08-20 ASSESSMENT — PAIN - FUNCTIONAL ASSESSMENT: PAIN_FUNCTIONAL_ASSESSMENT: PREVENTS OR INTERFERES SOME ACTIVE ACTIVITIES AND ADLS

## 2020-08-20 NOTE — PLAN OF CARE
Problem: Falls - Risk of:  Goal: Will remain free from falls  Description: Will remain free from falls  Outcome: Ongoing  Goal: Absence of physical injury  Description: Absence of physical injury  Outcome: Ongoing     Problem: Discharge Planning:  Goal: Discharged to appropriate level of care  Description: Discharged to appropriate level of care  Outcome: Ongoing     Problem:  Body Temperature - Imbalanced:  Goal: Ability to maintain a body temperature in the normal range will improve  Description: Ability to maintain a body temperature in the normal range will improve  Outcome: Ongoing     Problem: Mobility - Impaired:  Goal: Mobility will improve to maximum level  Description: Mobility will improve to maximum level  Outcome: Ongoing     Problem: Pain:  Goal: Pain level will decrease  Description: Pain level will decrease  Outcome: Ongoing  Goal: Control of acute pain  Description: Control of acute pain  Outcome: Ongoing  Goal: Control of chronic pain  Description: Control of chronic pain  Outcome: Ongoing     Problem: Skin Integrity - Impaired:  Goal: Will show no infection signs and symptoms  Description: Will show no infection signs and symptoms  Outcome: Ongoing  Goal: Absence of new skin breakdown  Description: Absence of new skin breakdown  Outcome: Ongoing     Problem: Pain:  Goal: Pain level will decrease  Description: Pain level will decrease  Outcome: Ongoing  Goal: Control of acute pain  Description: Control of acute pain  Outcome: Ongoing  Goal: Control of chronic pain  Description: Control of chronic pain  Outcome: Ongoing     Problem: Nutrition  Goal: Optimal nutrition therapy  Outcome: Ongoing     Problem: IP MOBILITY  Goal: LTG - patient will ambulate community distance  Outcome: Ongoing  Goal: LTG - Patient will navigate 4-6 steps with rails/device  Outcome: Ongoing     Problem: Skin Integrity:  Goal: Will show no infection signs and symptoms  Description: Will show no infection signs and symptoms  Outcome: Ongoing  Goal: Absence of new skin breakdown  Description: Absence of new skin breakdown  Outcome: Ongoing

## 2020-08-20 NOTE — PROGRESS NOTES
NEPHROLOGY PROGRESS NOTE    Patient :  Lizeth Valencia; 51 y.o. MRN# [id number]  Location:    Attending:  Cherise Rock MD  Admit Date:  2020   Hospital Day: 7      Reason for Consult: Management of acute kidney injury. Interval history: Patient was seen and examined today and she is nonoliguric. She is hemodynamically stable and does not have shortness of breath or chest pain. History of Present Illness: This is a 51 y.o. female and past medical history of essential hypertension, lymphedema presented to the hospital on 2020 with complaints of bilateral upper thigh pain and erythema, she has been having swelling of the legs for last 3 months and has been treated for cellulitis the symptoms started this time on Thursday when she developed pain on the bilateral upper thigh. Patient denied any fever but complained of some chills no nausea vomiting diarrhea  Patient was admitted for cellulitis, patient was treated with IV vancomycin. Serum creatinine on admission was 0.8 mg/dL, been stable till yesterday 20 serum creatinine was 1.1 mg/dL which increased to 2.9 mg/dL this morning and therefore nephrology consultation was requested. Vancomycin trough level was noted to be 29.8 yesterday 2020. No IV contrast was used  No hypotension was noted  Patient takes naproxen at home. Patient denies dysuria, gross hematuria, flank pain, nocturia, urgency, passing frothy urine or urinary incontinence. There has been no recent exposure to IV contrast.   There is no history  of paraprotein disease. Pt denies any history of recurrent UTI or kidney stones. Medication review shows use of diuretics.     Objective:  CURRENT TEMPERATURE:  Temp: 99 °F (37.2 °C)  MAXIMUM TEMPERATURE OVER 24HRS:  Temp (24hrs), Av.7 °F (37.1 °C), Min:98.2 °F (36.8 °C), Max:99 °F (37.2 °C)    CURRENT RESPIRATORY RATE:  Resp: 16  CURRENT PULSE:  Pulse: 96  CURRENT BLOOD PRESSURE:  BP: 138/81  24HR BLOOD PRESSURE RANGE:  Systolic (82YGD), VAX:810 , Min:136 , GBX:206   ; Diastolic (52WBF), LJN:93, Min:67, Max:81    24HR INTAKE/OUTPUT:      Intake/Output Summary (Last 24 hours) at 8/20/2020 0826  Last data filed at 8/20/2020 2940  Gross per 24 hour   Intake 3049 ml   Output 1500 ml   Net 1549 ml     Patient Vitals for the past 96 hrs (Last 3 readings):   Weight   08/20/20 0418 (!) 424 lb (192.3 kg)   08/19/20 0449 (!) 424 lb (192.3 kg)   08/18/20 0427 (!) 418 lb (189.6 kg)     Physical Exam:  GENERAL APPEARANCE: Alert and cooperative, and appears to be in no acute distress. CARDIAC: Normal S1 and S2. No S3, S4 or murmurs. Rhythm is regular. LUNGS: Normal respiratory effort  ABDOMEN: Soft with normal bowel sounds. EXTREMITIES: Swollen bilateral lower extremity, redness noted on the medial aspect of both thighs  NEURO: No tremors and no acute focal neurologic deficits. Labs:   CBC:No results for input(s): WBC, RBC, HGB, HCT, MCV, MCH, MCHC, RDW, PLT, MPV in the last 72 hours. BMP:   Recent Labs     08/18/20  0557 08/19/20  0630 08/20/20  0548    140 141   K 3.5* 3.7 3.7    103 105   CO2 24 21 22   BUN 19 24* 27*   CREATININE 4.74* 5.36* 5.43*   GLUCOSE 99 87 86   CALCIUM 8.5* 8.6 8.7        Urine Sodium:    Recent Labs     08/17/20  0929   DELIA 26        Urine Creatinine:    Recent Labs     08/17/20  0929   LABCREA 46.7     Assessment/plan:    1. Acute kidney injury - nonoliguric and most consistent with toxic acute tubular necrosis secondary to vancomycin. Although GFR is less than 15 mL/min, patient does not have clinical symptoms of volume overload, is nonoliguric and does not have uremic symptoms. She is reluctant to undergo dialysis and there are no acute indications for dialysis today. Plan: Continue IV fluid 0.9 normal saline at 75 mL/h. Avoid nephrotoxic agents. Strict input and output documentation. Basic metabolic profile daily.     2.  Systemic hypertension - blood pressure is adequately controlled. Prognosis is guarded. We will monitor serum creatinine closely and if serum creatinine remains plateaued tomorrow, will continue to hold off dialysis. Discussed with primary attending physician, Dr. Chaz Abraham.     Electronically signed by Thomas Bonilla MD on 8/20/2020 at 8:26 AM

## 2020-08-20 NOTE — PROGRESS NOTES
[facility]  Progress Note    Marah Zimmerman  MRN: [id number]  Date: 8/20/2020  LOS: 7     Reason for F/U :   Bilateral thigh cellulitis    Impression :   1. Morbid obesity   · status post gastric banding  2. Bilateral lower extremity lymphedema  3. Bilateral medial thigh cellulitis  4. Acute kidney injury    Recommendations:   · Continue on cefepime and daptomycin for now but the doses have been adjusted for the renal insufficiency. · The cellulitis continues to improve. · The patient's renal parameters are starting to plateau and she continues to have good renal output. · Given the good clinical improvement I do think that the antibiotic therapy can likely be stopped prior to discharge    Infection Control Recommendations:   Contact precautions    Discharge Planning:   Estimated Length of IV antimicrobials: 8/27/2020  Patient will need PICC line Insertion  Patient will need: Home IV   Patient willneed outpatient wound care: No    Medical Decision making / Summary of Stay:   Marah Zimmerman is a 51y.o.-year-old female who was initially admitted on 8/13/2020. Zo Rhodes has a history of morbid obesity, bilateral lower extremity lymphedema and has had chronic swelling in the lower extremities for 2 to 3 months. The patient is homebound/bedbound due to history of a prior hip fracture, arthritis and the obesity. She is also on long-term diuretic therapy with Bumex and Lasix. She has a history of gastric banding in the past .  The patient has had swelling/pain/erythema in the upper thighs that has been worsening. He has been on multiple different antibiotics over the last few months but the symptoms have not resolved. The patient is also had some areas with drainage and due to the worsening symptoms without improvement the patient was brought into the emergency room for evaluation. She reports occasional chills but no subjective fevers.   No nausea vomiting or diarrhea.     The patient was admitted started on Zosyn and vancomycin with leg elevation  I was asked to evaluate for the bilateral lower extremity cellulitis. Current evaluation:2020    /62   Pulse 91   Temp 98.8 °F (37.1 °C) (Oral)   Resp 18   Ht 5' 4\" (1.626 m)   Wt (!) 424 lb (192.3 kg)   SpO2 93%   BMI 72.78 kg/m²     Temperature Range: Temp: 98.8 °F (37.1 °C) Temp  Av °F (37.2 °C)  Min: 98.8 °F (37.1 °C)  Max: 99.1 °F (37.3 °C)  The patient is seen and evaluated at bedside she is awake and alert sitting up at the bedside in no acute distress no chest pain no palpitations no abdominal pain nausea vomiting or diarrhea. The cellulitic changes overall improved. The patient's renal parameters remained stable and she continues to have good urine output. Review of Systems   Constitutional: Negative. Respiratory: Negative. Cardiovascular: Negative. Gastrointestinal: Negative. Genitourinary: Negative. Musculoskeletal: Negative. Skin: Negative. Neurological: Negative. Psychiatric/Behavioral: Negative. Physical Examination :     Physical Exam  Constitutional:       Appearance: She is obese. HENT:      Head: Normocephalic and atraumatic. Mouth/Throat:      Mouth: Mucous membranes are moist.   Eyes:      Pupils: Pupils are equal, round, and reactive to light. Neck:      Musculoskeletal: Normal range of motion and neck supple. Cardiovascular:      Rate and Rhythm: Normal rate. Heart sounds: Normal heart sounds. Pulmonary:      Effort: Pulmonary effort is normal.      Breath sounds: Normal breath sounds. Abdominal:      General: Bowel sounds are normal.      Palpations: Abdomen is soft. Musculoskeletal: Normal range of motion. General: No deformity. Lymphadenopathy:      Cervical: No cervical adenopathy. Skin:     General: Skin is warm and dry. Findings: Erythema present. No rash. Comments:  The cellulitis in the medial thighs bilaterally is resolving Neurological:      Mental Status: She is alert and oriented to person, place, and time. Laboratory data:   I have independently reviewed the followinglabs:  CBC with Differential: No results for input(s): WBC, HGB, HCT, PLT, SEGSPCT, BANDSPCT, LYMPHOPCT, MONOPCT, EOSPCT in the last 72 hours. BMP:   Recent Labs     08/19/20  0630 08/20/20  0548    141   K 3.7 3.7    105   CO2 21 22   BUN 24* 27*   CREATININE 5.36* 5.43*     Hepatic Function Panel: No results for input(s): PROT, LABALBU, BILIDIR, IBILI, BILITOT, ALKPHOS, ALT, AST in the last 72 hours. No results for input(s): VANCOTROUGH in the last 72 hours. Lab Results   Component Value Date    CRP 5.1 (H) 10/13/2017     Lab Results   Component Value Date    SEDRATE 18 06/20/2015       No results for input(s): PROCAL in the last 72 hours.          Color, UA  YELLOW  YELLOW  Final  08/19/2020  2:06 AM  [facility]    Turbidity UA  SLIGHTLY CLOUDYAbnormal    CLEAR  Final  08/19/2020  2:06 AM  [facility]    Glucose, Ur  NEGATIVE  NEGATIVE  Final  08/19/2020  2:06 AM  [facility]    Bilirubin Urine  NEGATIVE  NEGATIVE  Final  08/19/2020  2:06 AM  [facility]    Ketones, Urine  NEGATIVE  NEGATIVE  Final  08/19/2020  2:06 AM  [facility]    Specific Gravity, UA  1.010  1.005 - 1.030  Final  08/19/2020  2:06 AM  [facility]    Urine Hgb  2+Abnormal    NEGATIVE  Final  08/19/2020  2:06 AM  [facility]    pH, UA  6.0  5.0 - 8.0  Final  08/19/2020  2:06 AM  [facility]    Protein, [place]  TRACEAbnormal    NEGATIVE  Final  08/19/2020  2:06 AM  [facility]    Urobilinogen, Urine  Normal  Normal  Final  08/19/2020  2:06 AM  [facility]    Nitrite, Urine  NEGATIVE  NEGATIVE  Final  08/19/2020  2:06 AM  [facility]    Leukocyte Esterase, Urine  SMALLAbnormal    NEGATIVE  Final  08/19/2020  2:06 AM  [facility]      Eosinophil, Ur  [place] SEEN  NONE SEEN  Final  08/19/2020  2:06 AM  [address]       Total CK  130  26 - 192 U/L  Final  08/19/2020  6:30 AM  [facility] [address] [facility]      Total CK  [address]    26 - 192 U/L  Final  08/17/2020  9:19 AM  [facility] [address] [facility]        Imaging Studies:   ONE XRAY VIEW OF THE CHEST 8/17/2020 1:39 pm   Impression    Satisfactory right upper extremity PICC placement.         No radiographic evidence of acute cardiopulmonary disease. Cultures:     Culture, Blood 1 [[id number]]   Collected: 08/13/20 1615    Order Status: Completed  Specimen: Blood  Updated: 08/19/20 0732     Specimen Description  . BLOOD     Special Requests  NOT REPORTED     Culture  NO GROWTH 6 DAYS    Culture, Blood 1 [[id number]]   Collected: 08/13/20 1614    Order Status: Completed  Specimen: Blood  Updated: 08/19/20 0732     Specimen Description  . BLOOD     Special Requests  RAC 4ML     Culture  NO GROWTH 6 DAYS        Medications:      amLODIPine  5 mg Oral Daily    cefepime  1 g Intravenous Q12H    [START ON 8/21/2020] daptomycin (CUBICIN) IVPB  4 mg/kg (Adjusted) Intravenous Q48H    heparin (porcine)  5,000 Units Subcutaneous 3 times per day    [Held by provider] bumetanide  1 mg Oral BID    sodium chloride flush  10 mL Intravenous 2 times per day    oxybutynin  15 mg Oral TID           [facility]  Chano Mullins MD  [employer] messaging  OFFICE: [phone]      Electronically signed by Chano Mullins MD on 8/20/2020 at 4:38 PM  Thank you for allowing us to participate in the care of this patient. Please call with questions. This note iscreated with the assistance of a speech recognition program.  While intending to generate a document that actually reflects the content of the visit, the document can still have some errors including those of syntax andsound a like substitutions which may escape proof reading.   In such instances, actual meaning can be extrapolated by contextual diversion.

## 2020-08-20 NOTE — PROGRESS NOTES
History:   has a past medical history of Anxiety, Asthma, Cellulitis, Headache(784.0), Hypertension, and Lymphedema. Social History:   reports that she has never smoked. She has never used smokeless tobacco. She reports current alcohol use. She reports that she does not use drugs. Family History:   Family History   Problem Relation Age of Onset    Thyroid Disease Mother     Asthma Mother     Heart Failure Mother     COPD Mother     Heart Attack Father     Diabetes Father     Stroke Father        Vitals:  /81   Pulse 96   Temp 99 °F (37.2 °C) (Oral)   Resp 16   Ht 5' 4\" (1.626 m)   Wt (!) 424 lb (192.3 kg)   SpO2 94%   BMI 72.78 kg/m²   Temp (24hrs), Av.7 °F (37.1 °C), Min:98.2 °F (36.8 °C), Max:99 °F (37.2 °C)    No results for input(s): POCGLU in the last 72 hours. I/O (24Hr): Intake/Output Summary (Last 24 hours) at 2020 0853  Last data filed at 2020 2342  Gross per 24 hour   Intake 3049 ml   Output 1500 ml   Net 1549 ml       Labs:  Hematology:No results for input(s): WBC, RBC, HGB, HCT, MCV, MCH, MCHC, RDW, PLT, MPV, SEDRATE, CRP, INR, DDIMER, BT8VOUKL, LABABSO in the last 72 hours. Invalid input(s): PT  Chemistry:  Recent Labs     20  0919 20  0557 20  0630 20  0548   NA  --  138 140 141   K  --  3.5* 3.7 3.7   CL  --  101 103 105   CO2  --  24 21 22   GLUCOSE  --  99 87 86   BUN  --  19 24* 27*   CREATININE  --  4.74* 5.36* 5.43*   ANIONGAP  --  13 16 14   LABGLOM  --  10* 9* 8*   GFRAA  --  12* 10* 10*   CALCIUM  --  8.5* 8.6 8.7   CKTOTAL 300*  --  130  --    No results for input(s): PROT, LABALBU, LABA1C, A7GTKOW, X7TMVVF, FT4, TSH, AST, ALT, LDH, GGT, ALKPHOS, LABGGT, BILITOT, BILIDIR, AMMONIA, AMYLASE, LIPASE, LACTATE, CHOL, HDL, LDLCHOLESTEROL, CHOLHDLRATIO, TRIG, VLDL, HWV56TS, PHENYTOIN, PHENYF, URICACID, POCGLU in the last 72 hours.   ABG:No results found for: POCPH, PHART, PH, POCPCO2, RPY1IEX, PCO2, POCPO2, PO2ART, PO2, POCHCO3, CPG4SUP, HCO3, NBEA, PBEA, BEART, BE, THGBART, THB, IWC7XAU, IKGV4PKZ, D3GENXYG, O2SAT, FIO2  Lab Results   Component Value Date/Time    SPECIAL NOT REPORTED 08/13/2020 04:15 PM     Lab Results   Component Value Date/Time    CULTURE NO GROWTH 6 DAYS 08/13/2020 04:15 PM       Radiology:  Us Renal Complete    Result Date: 8/17/2020  Negative ultrasound of the kidneys and urinary bladder. Xr Chest Portable    Result Date: 8/17/2020  Satisfactory right upper extremity PICC placement. No radiographic evidence of acute cardiopulmonary disease. Ir Insert Picc Vad W Sq Port >5 Years    Result Date: 8/17/2020  Successful ultrasound guided right upper extremity double lumen PICC placement. Physical Examination:        Physical Exam  Vitals signs and nursing note reviewed. Constitutional:       General: She is not in acute distress. HENT:      Head: Normocephalic and atraumatic. Eyes:      Conjunctiva/sclera: Conjunctivae normal.      Pupils: Pupils are equal, round, and reactive to light. Cardiovascular:      Rate and Rhythm: Normal rate and regular rhythm. Heart sounds: No murmur. Pulmonary:      Effort: Pulmonary effort is normal. No accessory muscle usage or respiratory distress. Breath sounds: No stridor. No decreased breath sounds, wheezing, rhonchi or rales. Abdominal:      General: Bowel sounds are normal. There is no distension. Palpations: Abdomen is soft. Abdomen is not rigid. Tenderness: There is no abdominal tenderness. There is no guarding. Musculoskeletal:         General: No tenderness. Right lower leg: Edema present. Left lower leg: Edema present. Comments: Bilateral lower extremity and Ace wrap,  medial thighs swelling and erythema are less   Skin:     General: Skin is warm and dry. Findings: No erythema, lesion or rash. Neurological:      Mental Status: She is alert and oriented to person, place, and time. Cranial Nerves:  No cranial nerve deficit. Motor: No seizure activity. Psychiatric:         Speech: Speech normal.         Behavior: Behavior normal. Behavior is cooperative. Assessment:        Hospital Problems           Last Modified POA    * (Principal) Cellulitis of anterior lower leg 8/13/2020 Yes    Lymphedema 8/13/2020 Yes    Morbid obesity ([facility] 8/13/2020 Yes    CARLEY (acute kidney injury) ([facility] 8/18/2020 No          Plan:        Bilateral lower extremity cellulitis: Currently on cefepime and daptomycin, continue to improve  Appreciate ID recommendation. Was on vancomycin initially but stopped secondary to CARLEY    Current lower extremity lymphedema: Continue Ace wrap, follow-up with lymphedema clinic as an outpatient    Acute kidney injury-nonoliguric: Secondary to vancomycin and diuresis, both were stopped creatinine continues to go up discussed with nephrologist ( Dr. Abbi Burnett) , will continue to monitor for today, might need dialysis if continues to worsen  Continue fluids at 75 mL/h  Avoid nephrotoxic agents    Morbid obesity as a complicating factor.     Continue DVT prophylaxis    Discussed with the patient and answered her questions up to her convenience    Discussed with nephrology, can wait today as well especially with good urine output normal potassium and acid-base  Plan to proceed with dialysis tomorrow if elevation persists  Or creatinine did come down    Bunny Jones MD  8/20/2020  8:53 AM

## 2020-08-20 NOTE — PLAN OF CARE
Problem:  Body Temperature - Imbalanced:  Goal: Ability to maintain a body temperature in the normal range will improve  Description: Ability to maintain a body temperature in the normal range will improve  8/20/2020 1255 by Camilla Díaz RN  Outcome: Ongoing  8/20/2020 0252 by Aravind Ball RN  Outcome: Ongoing

## 2020-08-20 NOTE — CARE COORDINATION
Call from Samantha at [facility].  Pt is covered 100% for Daptomycin  once deductible and OOP met. Dr. Artie Jack rounded and will follow for potential dialysis secondary to vanco.  Continue to follow and awaiting script for IV Dapto per ID.

## 2020-08-21 VITALS
OXYGEN SATURATION: 97 % | RESPIRATION RATE: 16 BRPM | TEMPERATURE: 98.8 F | WEIGHT: 293 LBS | HEART RATE: 90 BPM | SYSTOLIC BLOOD PRESSURE: 148 MMHG | HEIGHT: 64 IN | DIASTOLIC BLOOD PRESSURE: 95 MMHG | BODY MASS INDEX: 50.02 KG/M2

## 2020-08-21 LAB
ALBUMIN SERPL-MCNC: 2.7 G/DL (ref 3.5–5.2)
ANION GAP SERPL CALCULATED.3IONS-SCNC: 13 MMOL/L (ref 9–17)
BUN BLDV-MCNC: 29 MG/DL (ref 6–20)
BUN/CREAT BLD: 5 (ref 9–20)
CALCIUM SERPL-MCNC: 8.3 MG/DL (ref 8.6–10.4)
CHLORIDE BLD-SCNC: 105 MMOL/L (ref 98–107)
CO2: 22 MMOL/L (ref 20–31)
CREAT SERPL-MCNC: 5.53 MG/DL (ref 0.5–0.9)
FREE KAPPA/LAMBDA RATIO: 1.52 (ref 0.26–1.65)
GFR AFRICAN AMERICAN: 10 ML/MIN
GFR NON-AFRICAN AMERICAN: 8 ML/MIN
GFR SERPL CREATININE-BSD FRML MDRD: ABNORMAL ML/MIN/{1.73_M2}
GFR SERPL CREATININE-BSD FRML MDRD: ABNORMAL ML/MIN/{1.73_M2}
GLUCOSE BLD-MCNC: 96 MG/DL (ref 70–99)
HEPATITIS B CORE IGM ANTIBODY: NONREACTIVE
HEPATITIS B CORE TOTAL ANTIBODY: NONREACTIVE
HEPATITIS B SURFACE ANTIGEN: NONREACTIVE
HEPATITIS C ANTIBODY: NONREACTIVE
KAPPA FREE LIGHT CHAINS QNT: 10.11 MG/DL (ref 0.37–1.94)
LAMBDA FREE LIGHT CHAINS QNT: 6.64 MG/DL (ref 0.57–2.63)
PHOSPHORUS: 4.2 MG/DL (ref 2.6–4.5)
POTASSIUM SERPL-SCNC: 3.9 MMOL/L (ref 3.7–5.3)
SODIUM BLD-SCNC: 140 MMOL/L (ref 135–144)

## 2020-08-21 PROCEDURE — 87340 HEPATITIS B SURFACE AG IA: CPT

## 2020-08-21 PROCEDURE — 2580000003 HC RX 258: Performed by: INTERNAL MEDICINE

## 2020-08-21 PROCEDURE — 86803 HEPATITIS C AB TEST: CPT

## 2020-08-21 PROCEDURE — 83883 ASSAY NEPHELOMETRY NOT SPEC: CPT

## 2020-08-21 PROCEDURE — 6370000000 HC RX 637 (ALT 250 FOR IP): Performed by: NURSE PRACTITIONER

## 2020-08-21 PROCEDURE — 99239 HOSP IP/OBS DSCHRG MGMT >30: CPT | Performed by: FAMILY MEDICINE

## 2020-08-21 PROCEDURE — 6370000000 HC RX 637 (ALT 250 FOR IP): Performed by: FAMILY MEDICINE

## 2020-08-21 PROCEDURE — 6360000002 HC RX W HCPCS: Performed by: INTERNAL MEDICINE

## 2020-08-21 PROCEDURE — 86705 HEP B CORE ANTIBODY IGM: CPT

## 2020-08-21 PROCEDURE — 80069 RENAL FUNCTION PANEL: CPT

## 2020-08-21 PROCEDURE — 2580000003 HC RX 258: Performed by: FAMILY MEDICINE

## 2020-08-21 PROCEDURE — 99232 SBSQ HOSP IP/OBS MODERATE 35: CPT | Performed by: INTERNAL MEDICINE

## 2020-08-21 PROCEDURE — 86704 HEP B CORE ANTIBODY TOTAL: CPT

## 2020-08-21 PROCEDURE — 6360000002 HC RX W HCPCS: Performed by: FAMILY MEDICINE

## 2020-08-21 PROCEDURE — 36415 COLL VENOUS BLD VENIPUNCTURE: CPT

## 2020-08-21 RX ORDER — LEVOFLOXACIN 500 MG/1
500 TABLET, FILM COATED ORAL EVERY OTHER DAY
Qty: 3 TABLET | Refills: 0 | Status: SHIPPED | OUTPATIENT
Start: 2020-08-21 | End: 2020-08-26

## 2020-08-21 RX ORDER — ONDANSETRON 4 MG/1
4 TABLET, ORALLY DISINTEGRATING ORAL EVERY 12 HOURS PRN
Qty: 20 TABLET | Refills: 0 | Status: SHIPPED | OUTPATIENT
Start: 2020-08-21

## 2020-08-21 RX ORDER — DOXYCYCLINE HYCLATE 100 MG
100 TABLET ORAL 2 TIMES DAILY
Qty: 14 TABLET | Refills: 0 | Status: SHIPPED | OUTPATIENT
Start: 2020-08-21 | End: 2020-08-28

## 2020-08-21 RX ADMIN — CEFEPIME HYDROCHLORIDE 1 G: 1 INJECTION, POWDER, FOR SOLUTION INTRAMUSCULAR; INTRAVENOUS at 14:33

## 2020-08-21 RX ADMIN — SODIUM CHLORIDE: 9 INJECTION, SOLUTION INTRAVENOUS at 13:21

## 2020-08-21 RX ADMIN — OXYCODONE HYDROCHLORIDE AND ACETAMINOPHEN 1 TABLET: 10; 325 TABLET ORAL at 02:08

## 2020-08-21 RX ADMIN — OXYBUTYNIN CHLORIDE 15 MG: 5 TABLET, EXTENDED RELEASE ORAL at 08:39

## 2020-08-21 RX ADMIN — HEPARIN SODIUM 5000 UNITS: 5000 INJECTION INTRAVENOUS; SUBCUTANEOUS at 05:34

## 2020-08-21 RX ADMIN — HEPARIN SODIUM 5000 UNITS: 5000 INJECTION INTRAVENOUS; SUBCUTANEOUS at 14:33

## 2020-08-21 RX ADMIN — OXYCODONE HYDROCHLORIDE AND ACETAMINOPHEN 1 TABLET: 10; 325 TABLET ORAL at 08:42

## 2020-08-21 RX ADMIN — OXYCODONE HYDROCHLORIDE AND ACETAMINOPHEN 1 TABLET: 10; 325 TABLET ORAL at 17:44

## 2020-08-21 RX ADMIN — DIPHENHYDRAMINE HCL 25 MG: 25 TABLET ORAL at 10:23

## 2020-08-21 RX ADMIN — AMLODIPINE BESYLATE 5 MG: 5 TABLET ORAL at 08:39

## 2020-08-21 RX ADMIN — SODIUM CHLORIDE 450 MG: 9 INJECTION, SOLUTION INTRAVENOUS at 12:03

## 2020-08-21 RX ADMIN — CEFEPIME HYDROCHLORIDE 1 G: 1 INJECTION, POWDER, FOR SOLUTION INTRAMUSCULAR; INTRAVENOUS at 02:05

## 2020-08-21 RX ADMIN — ONDANSETRON 4 MG: 2 INJECTION INTRAMUSCULAR; INTRAVENOUS at 13:19

## 2020-08-21 ASSESSMENT — PAIN SCALES - GENERAL
PAINLEVEL_OUTOF10: 6
PAINLEVEL_OUTOF10: 8
PAINLEVEL_OUTOF10: 10
PAINLEVEL_OUTOF10: 9

## 2020-08-21 ASSESSMENT — PAIN DESCRIPTION - FREQUENCY: FREQUENCY: CONTINUOUS

## 2020-08-21 ASSESSMENT — ENCOUNTER SYMPTOMS
COUGH: 0
RESPIRATORY NEGATIVE: 1
WHEEZING: 0
GASTROINTESTINAL NEGATIVE: 1
SHORTNESS OF BREATH: 0
DIARRHEA: 0
ABDOMINAL PAIN: 0
VOMITING: 0
BLOOD IN STOOL: 0
CONSTIPATION: 0
CHEST TIGHTNESS: 0

## 2020-08-21 ASSESSMENT — PAIN DESCRIPTION - LOCATION: LOCATION: LEG

## 2020-08-21 ASSESSMENT — PAIN DESCRIPTION - ORIENTATION: ORIENTATION: RIGHT;LEFT

## 2020-08-21 ASSESSMENT — PAIN DESCRIPTION - PAIN TYPE: TYPE: ACUTE PAIN

## 2020-08-21 ASSESSMENT — PAIN DESCRIPTION - DESCRIPTORS: DESCRIPTORS: ACHING;BURNING

## 2020-08-21 NOTE — PROGRESS NOTES
DATE: 2020    NAME: Mare Murillo  MRN: [id number]   : 1970    Patient not seen this date for Physical Therapy due to:  [] Blood transfusion in progress  [] Cancel by RN  [] Hemodialysis  [x]  Refusal by Patient   [] Spine Precautions   [] Strict Bedrest  [] Surgery  [] Testing      [] Other        [] PT being discontinued at this time. Patient independent. No further needs. [] PT being discontinued at this time as the patient has been transferred to hospice care. No further needs.     MIK BERGMAN, PTA

## 2020-08-21 NOTE — PROGRESS NOTES
The pt was discharged to home. The discharge instructions were given and reviewed with the pt. She was escorted to the exit per wheelchair in stable condition.

## 2020-08-21 NOTE — PROGRESS NOTES
NEPHROLOGY PROGRESS NOTE    Patient :  Hans Hardy; 51 y.o. MRN# [id number]  Location:    Attending:  Wilmar Cooney MD  Admit Date:  2020   Hospital Day: 8      Reason for Consult: Management of acute kidney injury. Interval history: Patient was seen and examined today and although nonoliguric, renal function continues to worsen. She has slight decrease in appetite but denies nausea or vomiting. History of Present Illness: This is a 51 y.o. female and past medical history of essential hypertension, lymphedema presented to the hospital on 2020 with complaints of bilateral upper thigh pain and erythema, she has been having swelling of the legs for last 3 months and has been treated for cellulitis the symptoms started this time on Thursday when she developed pain on the bilateral upper thigh. Patient denied any fever but complained of some chills no nausea vomiting diarrhea  Patient was admitted for cellulitis, patient was treated with IV vancomycin. Serum creatinine on admission was 0.8 mg/dL, been stable till yesterday 20 serum creatinine was 1.1 mg/dL which increased to 2.9 mg/dL this morning and therefore nephrology consultation was requested. Vancomycin trough level was noted to be 29.8 yesterday 2020. No IV contrast was used  No hypotension was noted  Patient takes naproxen at home. Patient denies dysuria, gross hematuria, flank pain, nocturia, urgency, passing frothy urine or urinary incontinence. There has been no recent exposure to IV contrast.   There is no history  of paraprotein disease. Pt denies any history of recurrent UTI or kidney stones. Medication review shows use of diuretics.     Objective:  CURRENT TEMPERATURE:  Temp: 99 °F (37.2 °C)  MAXIMUM TEMPERATURE OVER 24HRS:  Temp (24hrs), Av.3 °F (37.4 °C), Min:98.8 °F (37.1 °C), Max:100.4 °F (38 °C)    CURRENT RESPIRATORY RATE:  Resp: 16  CURRENT PULSE:  Pulse: 98  CURRENT BLOOD PRESSURE:  BP: daily.    2.  Systemic hypertension - blood pressure is adequately controlled. 3.  Lower extremity cellulitis - patient is on cefepime 1 g every 12 hours and daptomycin has been adjusted for GFR. Prognosis is guarded. We will monitor serum creatinine closely and if serum creatinine remains plateaued tomorrow, will continue to hold off dialysis.         Electronically signed by Shawna Alva MD on 8/21/2020 at 8:20 AM

## 2020-08-21 NOTE — PROGRESS NOTES
The pt disconnected her PICC line. . Writer educated the pt and requested that she call out ans ask to be unhooked. The pt did not reply to writer.

## 2020-08-21 NOTE — PROGRESS NOTES
IR notified that patient refusing to have line placed and it will not be done today. Nurse notified that patient is too heavy for IR table, if patient needs dialysis line over the weekend, will need to be done in surgery.

## 2020-08-21 NOTE — PROGRESS NOTES
[facility]  Progress Note    Michael cAharya  MRN: [id number]  Date: 8/21/2020  LOS: 8     Reason for F/U :   Bilateral thigh cellulitis    Impression :   1. Morbid obesity   · status post gastric banding  2. Bilateral lower extremity lymphedema  3. Bilateral medial thigh cellulitis  4. Acute kidney injury    Recommendations:   · The patient continues on cefepime and daptomycin for now. · The cellulitis continues to improve. · The patient was offered hemodialysis today by the nephrologist and has declined this. · The care was discussed with the primary service and the RN and the plan is for potential discharge today. · Infectious disease wise I am not opposed to discharge on oral Levaquin and doxycycline. Infection Control Recommendations:   Contact precautions    Discharge Planning:   Patient will need PICC line Insertion  Patient willneed outpatient wound care: No    Medical Decision making / Summary of Stay:   Michael Acharya is a 51y.o.-year-old female who was initially admitted on 8/13/2020. Jl Gambino has a history of morbid obesity, bilateral lower extremity lymphedema and has had chronic swelling in the lower extremities for 2 to 3 months. The patient is homebound/bedbound due to history of a prior hip fracture, arthritis and the obesity. She is also on long-term diuretic therapy with Bumex and Lasix. She has a history of gastric banding in the past .  The patient has had swelling/pain/erythema in the upper thighs that has been worsening. He has been on multiple different antibiotics over the last few months but the symptoms have not resolved. The patient is also had some areas with drainage and due to the worsening symptoms without improvement the patient was brought into the emergency room for evaluation. She reports occasional chills but no subjective fevers.   No nausea vomiting or diarrhea.     The patient was admitted started on Zosyn and vancomycin with leg elevation  I was asked to evaluate for the bilateral lower extremity cellulitis. Current evaluation:2020    BP (!) 148/95   Pulse 90   Temp 98.8 °F (37.1 °C) (Oral)   Resp 16   Ht 5' 4\" (1.626 m)   Wt (!) 428 lb 11.2 oz (194.5 kg)   SpO2 97%   BMI 73.59 kg/m²     Temperature Range: Temp: 98.8 °F (37.1 °C) Temp  Av.3 °F (37.4 °C)  Min: 98.8 °F (37.1 °C)  Max: 100.4 °F (38 °C)  The patient is seen and evaluated at bedside she is awake and alert in no acute distress. No subjective fever or chills. No abdominal pain nausea or vomiting. She has been making good urine today. Review of Systems   Constitutional: Negative. Respiratory: Negative. Cardiovascular: Negative. Gastrointestinal: Negative. Genitourinary: Negative. Musculoskeletal: Negative. Skin: Negative. Neurological: Negative. Psychiatric/Behavioral: Negative. Physical Examination :     Physical Exam  Constitutional:       Appearance: She is obese. HENT:      Head: Normocephalic and atraumatic. Mouth/Throat:      Mouth: Mucous membranes are moist.   Eyes:      Pupils: Pupils are equal, round, and reactive to light. Neck:      Musculoskeletal: Normal range of motion and neck supple. Cardiovascular:      Rate and Rhythm: Normal rate. Heart sounds: Normal heart sounds. Pulmonary:      Effort: Pulmonary effort is normal.      Breath sounds: Normal breath sounds. Abdominal:      General: Bowel sounds are normal.      Palpations: Abdomen is soft. Musculoskeletal: Normal range of motion. General: No deformity. Lymphadenopathy:      Cervical: No cervical adenopathy. Skin:     General: Skin is warm and dry. Findings: Erythema present. No rash. Comments: The cellulitis in the medial thighs bilaterally is resolving   Neurological:      Mental Status: She is alert and oriented to person, place, and time.            Laboratory data:   I have independently reviewed the followinglabs:  CBC with Differential: No results for input(s): WBC, HGB, HCT, PLT, SEGSPCT, BANDSPCT, LYMPHOPCT, MONOPCT, EOSPCT in the last 72 hours. BMP:   Recent Labs     08/20/20  0548 08/21/20  0523    140   K 3.7 3.9    105   CO2 22 22   BUN 27* 29*   CREATININE 5.43* 5.53*     Hepatic Function Panel:   Recent Labs     08/21/20  0523   LABALBU 2.7*     No results for input(s): VANCRONAKOUGH in the last 72 hours. Lab Results   Component Value Date    CRP 5.1 (H) 10/13/2017     Lab Results   Component Value Date    SEDRATE 18 06/20/2015       No results for input(s): PROCAL in the last 72 hours. Hep B Core Ab, IgM  NONREACTIVE  NONREACTIVE  Final  08/21/2020  8:40 AM  [address]      Hep B Core Total Ab  NONREACTIVE  NONREACTIVE  Final  08/21/2020  8:40 AM  [address]      Hepatitis B Surface Ag  NONREACTIVE  NONREACTIVE  Final  08/21/2020  8:40 AM  [address]      Hepatitis C Ab  NONREACTIVE  NONREACTIVE  Final  08/21/2020  8:40 AM  [address]      [place] Free Light Chains QNT  10. 11High    0.37 - 1.94 mg/dL  Final  08/21/2020  8:40 AM  [address]    Lambda Free Light Chains QNT  6. 64High    0.57 - 2.63 mg/dL  Final  08/21/2020  8:40 AM  [address]    Free Follett/Lambda Ratio  1.52  0.26 - 1.65  Final  08/21/2020  8:40 AM  [address]        Color, [place]  YELLOW  YELLOW  Final  08/19/2020  2:06 AM  - [facility]    Turbidity UA  SLIGHTLY CLOUDYAbnormal    CLEAR  Final  08/19/2020  2:06 AM  - [facility]    Glucose, Ur  NEGATIVE  NEGATIVE  Final  08/19/2020  2:06 AM  - [facility]    Bilirubin Urine  NEGATIVE  NEGATIVE  Final  08/19/2020  2:06 AM  - [facility]    Ketones, Urine  NEGATIVE  NEGATIVE  Final  08/19/2020  2:06 AM  - [facility]    Specific Gravity, UA  1.010  1.005 - 1.030  Final  08/19/2020  2:06 AM  - [facility] Urine Hgb  2+Abnormal    NEGATIVE  Final  08/19/2020  2:06 AM  - [address] [facility]    pH, UA  6.0  5.0 - 8.0  Final  08/19/2020  2:06 AM  - [address] [facility]    Protein, [place]  TRACEAbnormal    NEGATIVE  Final  08/19/2020  2:06 AM  - [address] [facility]    Urobilinogen, Urine  Normal  Normal  Final  08/19/2020  2:06 AM  - [address] [facility]    Nitrite, Urine  NEGATIVE  NEGATIVE  Final  08/19/2020  2:06 AM  - [address] [facility]    Leukocyte Esterase, Urine  SMALLAbnormal    NEGATIVE  Final  08/19/2020  2:06 AM  - [address] [facility]      Eosinophil, Ur  NONE SEEN  NONE SEEN  Final  08/19/2020  2:06 AM  [address]       Total CK  130  26 - 192 U/L  Final  08/19/2020  6:30 AM  [facility] [address] [facility]      Total CK  300High    26 - 192 U/L  Final  08/17/2020  9:19 AM  - [address] [facility]        Imaging Studies:   ONE XRAY VIEW OF THE CHEST 8/17/2020 1:39 pm   Impression    Satisfactory right upper extremity PICC placement.         No radiographic evidence of acute cardiopulmonary disease. Cultures:     Culture, Blood 1 [[id number]]   Collected: 08/13/20 1615    Order Status: Completed  Specimen: Blood  Updated: 08/19/20 0732     Specimen Description  . BLOOD     Special Requests  NOT REPORTED     Culture  NO GROWTH 6 DAYS    Culture, Blood 1 [[id number]]   Collected: 08/13/20 1614    Order Status: Completed  Specimen: Blood  Updated: 08/19/20 0732     Specimen Description  . BLOOD     Special Requests  RAC 4ML     Culture  NO GROWTH 6 DAYS        Medications:      amLODIPine  5 mg Oral Daily    cefepime  1 g Intravenous Q12H    daptomycin (CUBICIN) IVPB  4 mg/kg (Adjusted) Intravenous Q48H    heparin (porcine)  5,000 Units Subcutaneous 3 times per day    [Held by provider] bumetanide  1 mg Oral BID    sodium chloride flush  10 mL Intravenous 2 times per day    oxybutynin  15 mg Oral TID           [facility]  Valery Santos

## 2020-08-21 NOTE — PROGRESS NOTES
[facility]    Progress Note    2020    2:11 PM    Name:   Tg Youngblood  MRN:     [id number]     [place]:      [de-identified]   Room:     IP Day:  8  Admit Date:  2020  2:12 PM    PCP:   April Mayes MD  Code Status:  Full Code    Subjective:     C/C:   Chief Complaint   Patient presents with    Cellulitis     L leg     Interval History Status: worsened. Patient seen and examined at bedside, no acute events overnight. Her cellulitis is improving  Creatinine is worse , she is still producing good amount of urine   No acidosis or hyperkalemia   She has some  fatigue  Patient denies any chest pain, shortness of breath, chills, fevers, nausea or vomiting. Patient vitals, labs and all providers notes were reviewed,from overnight shift and morning updates were noted and discussed with the nurse    Brief History:     Per my associate  Tg Youngblood is 51 y.o. female  Who was admitted to the hospital on 2020 for treatment of Cellulitis of anterior lower leg. Patient was brought to emergency room by her sister for bilateral lower extremity Swelling, redness, pain.  Patient has known history of lymphedema, morbid obesity. Leobardo Kim has been struggling with lower extremity swelling for last 2 to 3 months.  She is homebound and has been visited by her PCP for home visits. Leobardo Kim was treated with multiple courses of antibiotics and last 2 months.  Patient also has scheduled appointment with lymphedema clinic. Leobardo Kim noted redness and left leg around the knee and inner thigh associated with pain.  Patient also reported some drainage in last week.  Patient also complains of swelling and pain in right inner thigh.  She denies any fevers but has been having chills.   Evaluation emergency room showed temperature 98.5, blood pressure 152/109, BMI 71.4 lab evaluation showed normal electrolytes, kidney function.   Patient reports that she has difficulty in ambulation due to hip fracture and knee arthritis.  She is on long-term diuretics Bumex and Lasix.  Patient had gastric banding before. Patient was started on empiric IV vancomycin, Zosyn. Patient had mild improvement in cellulitis however developed progressive declining kidney function. Review of Systems:     Review of Systems   Constitutional: Positive for fatigue. Negative for chills, diaphoresis and fever. HENT: Negative for congestion. Eyes: Negative for visual disturbance. Respiratory: Negative for cough, chest tightness, shortness of breath and wheezing. Cardiovascular: Negative for chest pain, palpitations and leg swelling. Gastrointestinal: Negative for abdominal pain, blood in stool, constipation, diarrhea and vomiting. Genitourinary: Negative for difficulty urinating. Neurological: Negative for dizziness, weakness, light-headedness, numbness and headaches. All other systems reviewed and are negative. Medications: Allergies:     Allergies   Allergen Reactions    Clindamycin/Lincomycin     Estrogens     Fish-Derived Products     Glucophage [Metformin Hcl]     Ibuprofen     Iodine     Lisinopril     Medrol [Methylprednisolone]      itching    Metformin And Related     Morphine Itching    Nsaids      Pt has had a gastric banding       Current Meds:   Scheduled Meds:    amLODIPine  5 mg Oral Daily    cefepime  1 g Intravenous Q12H    daptomycin (CUBICIN) IVPB  4 mg/kg (Adjusted) Intravenous Q48H    heparin (porcine)  5,000 Units Subcutaneous 3 times per day    [Held by provider] bumetanide  1 mg Oral BID    sodium chloride flush  10 mL Intravenous 2 times per day    oxybutynin  15 mg Oral TID     Continuous Infusions:    sodium chloride 75 mL/hr at 08/21/20 1321     PRN Meds: diphenhydrAMINE, ondansetron, oxyCODONE-acetaminophen, sodium chloride flush, acetaminophen **OR** acetaminophen    Data:     Past Medical History:   has a past medical history of Anxiety, Asthma, Cellulitis, Headache(784.0), Hypertension, and Lymphedema. Social History:   reports that she has never smoked. She has never used smokeless tobacco. She reports current alcohol use. She reports that she does not use drugs. Family History:   Family History   Problem Relation Age of Onset    Thyroid Disease Mother     Asthma Mother     Heart Failure Mother     COPD Mother     Heart Attack Father     Diabetes Father     Stroke Father        Vitals:  BP (!) 148/95   Pulse 90   Temp 98.8 °F (37.1 °C) (Oral)   Resp 16   Ht 5' 4\" (1.626 m)   Wt (!) 428 lb 11.2 oz (194.5 kg)   SpO2 97%   BMI 73.59 kg/m²   Temp (24hrs), Av.3 °F (37.4 °C), Min:98.8 °F (37.1 °C), Max:100.4 °F (38 °C)    No results for input(s): POCGLU in the last 72 hours. I/O (24Hr): Intake/Output Summary (Last 24 hours) at 2020 1411  Last data filed at 2020 1327  Gross per 24 hour   Intake 1598.34 ml   Output 2300 ml   Net -701.66 ml       Labs:  Hematology:No results for input(s): WBC, RBC, HGB, HCT, MCV, MCH, MCHC, RDW, PLT, MPV, SEDRATE, CRP, INR, DDIMER, HD2KFKEN, LABABSO in the last 72 hours.     Invalid input(s): PT  Chemistry:  Recent Labs     20  0630 20  0548 20  0523    141 140   K 3.7 3.7 3.9    105 105   CO2 21 22 22   GLUCOSE 87 86 96   BUN 24* 27* 29*   CREATININE 5.36* 5.43* 5.53*   ANIONGAP 16 14 13   LABGLOM 9* 8* 8*   GFRAA 10* 10* 10*   CALCIUM 8.6 8.7 8.3*   PHOS  --   --  4.2   CKTOTAL 130  --   --      Recent Labs     20  0523   LABALBU 2.7*     ABG:No results found for: POCPH, PHART, PH, POCPCO2, GUC4RSA, PCO2, POCPO2, PO2ART, PO2, POCHCO3, GPE9YHN, HCO3, NBEA, PBEA, BEART, BE, THGBART, THB, MGO1ZIN, QENQ0DEW, F2JNTZUX, O2SAT, FIO2  Lab Results   Component Value Date/Time    SPECIAL NOT REPORTED 2020 04:15 PM     Lab Results   Component Value Date/Time    CULTURE NO GROWTH 6 DAYS 2020 04:15 PM       Radiology:  Us Renal Complete    Result Date: 8/17/2020  Negative ultrasound of the kidneys and urinary bladder. Xr Chest Portable    Result Date: 8/17/2020  Satisfactory right upper extremity PICC placement. No radiographic evidence of acute cardiopulmonary disease. Ir Insert Picc Vad W Sq Port >5 Years    Result Date: 8/17/2020  Successful ultrasound guided right upper extremity double lumen PICC placement. Physical Examination:        Physical Exam  Vitals signs and nursing note reviewed. Constitutional:       General: She is not in acute distress. HENT:      Head: Normocephalic and atraumatic. Eyes:      Conjunctiva/sclera: Conjunctivae normal.      Pupils: Pupils are equal, round, and reactive to light. Cardiovascular:      Rate and Rhythm: Normal rate and regular rhythm. Heart sounds: No murmur. Pulmonary:      Effort: Pulmonary effort is normal. No accessory muscle usage or respiratory distress. Breath sounds: No stridor. No decreased breath sounds, wheezing, rhonchi or rales. Abdominal:      General: Bowel sounds are normal. There is no distension. Palpations: Abdomen is soft. Abdomen is not rigid. Tenderness: There is no abdominal tenderness. There is no guarding. Musculoskeletal:         General: No tenderness. Right lower leg: Edema present. Left lower leg: Edema present. Comments: Bilateral lower extremity and Ace wrap,  medial thighs swelling and erythema are less   Skin:     General: Skin is warm and dry. Findings: No erythema, lesion or rash. Neurological:      Mental Status: She is alert and oriented to person, place, and time. Cranial Nerves: No cranial nerve deficit. Motor: No seizure activity. Psychiatric:         Speech: Speech normal.         Behavior: Behavior normal. Behavior is cooperative.          Assessment:        Hospital Problems           Last Modified POA    * (Principal) Cellulitis of anterior lower leg 8/13/2020 Yes Lymphedema 8/13/2020 Yes    Morbid obesity ([facility] [place] [address] 8/13/2020 Yes    CARLEY (acute kidney injury) ([facility] [place] [address] 8/18/2020 No          Plan:        Bilateral lower extremity cellulitis: Currently on cefepime and daptomycin, continue to improve  Appreciate ID recommendation, likely to be discharged on oral or no antibiotics  Was on vancomycin initially but stopped secondary to CARLEY    Current lower extremity lymphedema: Continue Ace wrap, follow-up with lymphedema clinic as an outpatient    Acute kidney injury with ATN, nonoliguric: Secondary to vancomycin , continue to Avoid nephrotoxic agents  Creatinine continue to increase  Discussed with the patient need for hemodialysis, nephrology ordered, will his catheter placement but patient and her family are refusing any intervention    Morbid obesity as a complicating factor.     Continue DVT prophylaxis    If patient continue to refuse dialysis likely to be discharged and follow-up with nephrology as an outpatient with repeat BMP    Discussed with nephrology, the patient and the nurse    Med rec done  Scripts added   30+ minutes spent      Ahsan Lewis MD  8/21/2020  2:11 PM

## 2020-08-21 NOTE — PLAN OF CARE
Problem: Discharge Planning:  Goal: Discharged to appropriate level of care  Description: Discharged to appropriate level of care  8/21/2020 0032 by Eddie Kearns RN  Outcome: Ongoing     Problem:  Body Temperature - Imbalanced:  Goal: Ability to maintain a body temperature in the normal range will improve  Description: Ability to maintain a body temperature in the normal range will improve  8/21/2020 1328 by Klaus Castellon RN  Outcome: Ongoing  8/21/2020 0032 by Eddie Kearns RN  Outcome: Ongoing

## 2020-08-21 NOTE — PLAN OF CARE
Problem: Falls - Risk of:  Goal: Will remain free from falls  Description: Will remain free from falls  Outcome: Ongoing     Problem: Discharge Planning:  Goal: Discharged to appropriate level of care  Description: Discharged to appropriate level of care  8/21/2020 0032 by Xin Nicholson RN  Outcome: Ongoing     Problem:  Body Temperature - Imbalanced:  Goal: Ability to maintain a body temperature in the normal range will improve  Description: Ability to maintain a body temperature in the normal range will improve  8/21/2020 0032 by Xin Nicholson RN  Outcome: Ongoing     Problem: Mobility - Impaired:  Goal: Mobility will improve to maximum level  Description: Mobility will improve to maximum level  8/21/2020 0032 by Xin Nicholson RN  Outcome: Ongoing     Problem: Pain:  Goal: Pain level will decrease  Description: Pain level will decrease  Outcome: Ongoing     Problem: Pain:  Goal: Control of acute pain  Description: Control of acute pain  Outcome: Ongoing     Problem: Pain:  Goal: Control of chronic pain  Description: Control of chronic pain  Outcome: Ongoing     Problem: Skin Integrity - Impaired:  Goal: Will show no infection signs and symptoms  Description: Will show no infection signs and symptoms  Outcome: Ongoing     Problem: Skin Integrity - Impaired:  Goal: Absence of new skin breakdown  Description: Absence of new skin breakdown  Outcome: Ongoing     Problem: Pain:  Goal: Pain level will decrease  Description: Pain level will decrease  Outcome: Ongoing     Problem: Skin Integrity:  Goal: Will show no infection signs and symptoms  Description: Will show no infection signs and symptoms  8/21/2020 0032 by Xin Nicholson RN  Outcome: Ongoing     Problem: Skin Integrity:  Goal: Absence of new skin breakdown  Description: Absence of new skin breakdown  8/21/2020 0032 by Xin Nicholson RN  Outcome: Ongoing

## 2020-08-21 NOTE — PROGRESS NOTES
The pt mother Mrs Karina Gandara is on the phone with Dr Segun James discussing how to procedure.   The pt has stated to the doctor that she feels her daughter should not proceed with dialysis treatments

## 2020-08-22 NOTE — DISCHARGE SUMMARY
[facility]    Discharge Summary     Patient ID: Emmett Suazo  :  1970   MRN: [id number]     ACCOUNT:  [de-identified]   Patient's PCP: Leti Lorenzo MD  Admit Date: 2020   Discharge Date: 2020     Length of Stay: 8  Code Status:  Prior  Admitting Physician: Rick Hutson MD  Discharge Physician: Luisito Jovel MD     Active Discharge Diagnoses:     Hospital Problem Lists:  Principal Problem:    Cellulitis of anterior lower leg  Active Problems:    Lymphedema    Morbid obesity ([facility]    CARLEY (acute kidney injury) ([facility]  Resolved Problems:    * No resolved hospital problems. *      Admission Condition:  poor     Discharged [address]  Stay:     Hospital Course:     Per my associate  Meghana Pierre is 51 y. o. female  Who was admitted to the hospital on 2020 for treatment of Cellulitis of anterior lower leg. Patient was brought to emergency room by her sister for bilateral lower extremity Swelling, redness, pain.  Patient has known history of lymphedema, morbid obesity. Ena Nguyen has been struggling with lower extremity swelling for last 2 to 3 months.  She is homebound and has been visited by her PCP for home visits. Ena Nguyen was treated with multiple courses of antibiotics and last 2 months.  Patient also has scheduled appointment with lymphedema clinic. Ena Nguyen noted redness and left leg around the knee and inner thigh associated with pain.  Patient also reported some drainage in last week.  Patient also complains of swelling and pain in right inner thigh.  She denies any fevers but has been having chills.   Evaluation emergency room showed temperature 98.5, blood pressure 152/109, BMI 71.4 lab evaluation showed normal electrolytes, kidney function.   Patient reports that she has difficulty in ambulation due to hip fracture and knee arthritis.  She is on long-term diuretics Bumex and Lasix.  Patient had gastric banding before. Patient was started on empiric IV vancomycin, Zosyn.  Patient had mild improvement in cellulitis however developed progressive declining kidney function.      During the admission patient was managed as follow    Bilateral lower extremity cellulitis: Currently on cefepime and daptomycin, continue to improve  Appreciate ID recommendation, likely to be discharged on oral or no antibiotics  Was on vancomycin initially but stopped secondary to CARLEY     Current lower extremity lymphedema: Continue Ace wrap, follow-up with lymphedema clinic as an outpatient     Acute kidney injury with ATN, nonoliguric: Secondary to vancomycin , continue to Avoid nephrotoxic agents  Creatinine continue to increase  Discussed with the patient need for hemodialysis, nephrology ordered, will his catheter placement but patient and her family are refusing any intervention     Morbid obesity as a complicating factor.     Continue DVT prophylaxis     Patient continue to refuse dialysis , discussed with nephrology , she will be discharged and follow-up with nephrology as an outpatient with repeat BMP     Discussed with nephrology, the patient and the nurse     Med rec done  Scripts added   30+ minutes spent      Significant therapeutic interventions:     Significant Diagnostic Studies:     Radiology:  Us Renal Complete    Result Date: 8/17/2020  Negative ultrasound of the kidneys and urinary bladder. Xr Chest Portable    Result Date: 8/17/2020  Satisfactory right upper extremity PICC placement. No radiographic evidence of acute cardiopulmonary disease. Ir Insert Picc Vad W Sq Port >5 Years    Result Date: 8/17/2020  Successful ultrasound guided right upper extremity double lumen PICC placement.        Consultations:    Consults:     Final Specialist Recommendations/Findings:   IP CONSULT TO INTERNAL MEDICINE  PHARMACY TO DOSE VANCOMYCIN  IP CONSULT TO INFECTIOUS DISEASES  IP CONSULT TO NEPHROLOGY  IP CONSULT TO IV TEAM      The patient was seen and examined on day of discharge and this discharge summary is in conjunction with any daily progress note from day of discharge. Discharge plan:     Disposition: Home    Physician Follow Up:     Suleman Quintero, [facility]  [address] [address]  [facility]  [address]    Trinhntferoz Perezs, [address]  [address] [facility] [address]  [phone]    Schedule an appointment as soon as possible for a visit on 8/24/2020  Follow up care. Call monday to schedule appointment for next week.     Suleman Quintero MD  [address] Yonathan Pacheco     Schedule an appointment as soon as possible for a visit         Requiring Further Evaluation/Follow Up POST HOSPITALIZATION/Incidental Findings:     Diet: renal diet    Activity: As tolerated    Instructions to Patient:     Discharge Medications:      Medication List      START taking these medications    levoFLOXacin 500 MG tablet  Commonly known as:  Levaquin  Take 1 tablet by mouth every other day for 3 doses        CHANGE how you take these medications    doxycycline hyclate 100 MG tablet  Commonly known as:  VIBRA-TABS  Take 1 tablet by mouth 2 times daily for 7 days  What changed:    · medication strength  · how much to take  · when to take this     ondansetron 4 MG disintegrating tablet  Commonly known as:  Zofran ODT  Take 1 tablet by mouth every 12 hours as needed for Nausea or Vomiting  What changed:  when to take this        CONTINUE taking these medications    albuterol sulfate  (90 Base) MCG/ACT inhaler  Commonly known as:  Proventil HFA  Inhale 1-2 puffs into the lungs every 4 hours as needed for Wheezing     amLODIPine 5 MG tablet  Commonly known as:  NORVASC  Take 1 tablet by mouth daily     amphetamine-dextroamphetamine 20 MG tablet  Commonly known as:  ADDERALL     oxybutynin 15 MG extended release tablet  Commonly known as:  DITROPAN XL     oxyCODONE-acetaminophen  MG per tablet  Commonly known as:  PERCOCET        STOP taking these medications    bumetanide 1 MG tablet  Commonly known as:  BUMEX     Pennsaid 2 % Soln  Generic drug:  diclofenac           Where to Get Your Medications      These medications were sent to [facility], [address] [facility] - P [phone] - F [phone]  [address] [facility] [id number]    Phone:  [phone]   · doxycycline hyclate 100 MG tablet  · levoFLOXacin 500 MG tablet  · ondansetron 4 MG disintegrating tablet         Discharge Procedure Orders   Basic Metabolic Panel   Standing Status: Future Standing Exp. Date: 08/21/21       Time Spent on discharge is  31 mins in patient examination, evaluation, counseling as well as medication reconciliation, prescriptions for required medications, discharge plan and follow up. Electronically signed by   John Cunningham MD  8/22/2020  11:15 AM      Thank you Dr. Raquel Williamson MD for the opportunity to be involved in this patient's care.

## 2020-08-26 ENCOUNTER — TELEPHONE (OUTPATIENT)
Dept: WOUND CARE | Age: 50
End: 2020-08-26

## 2020-09-02 NOTE — ADT AUTH CERT
Cellulitis - Care Day 8 (8/20/2020) by Elvia Crowe RN         Review Status  Review Entered    Completed  9/1/2020 16:21        Criteria Review       Care Day: 8 Care Date: 8/20/2020 Level of Care: Inpatient Floor    Guideline Day 1    Level Of Care    (X) Floor [C]    Clinical Status    ( ) * Clinical Indications met [D]    (X) Possible leukocytosis, fever, tachycardia, or hypotension    (X) Possible dehydration or altered mental status    Activity    (X) Activity as tolerated    Routes    (X) Oral or IV hydration, medications    (X) Diet as tolerated    9/1/2020 4:21 PM EDT by Cristhian Cody      general    Interventions    (X) CBC, electrolytes    (X) Possible lesion and blood cultures    (X) Possible imaging of cellulitis site    (X) Elevation of affected area, if possible    Medications    (X) IV antibiotics    9/1/2020 4:21 PM EDT by Cristhian Cody      cefepime 1 [id number]    * Milestone    Additional Notes    8/20    /81   Pulse 96   Temp 99 °F (37.2 °C) (Oral)   Resp 16   Ht 5' 4\" (1.626 m)   Wt (!) 424 lb (192.3 kg)   SpO2 94%   BMI 72.78 kg/m²       Neph:    Patient was seen and examined today and she is nonoliguric.  She is hemodynamically stable and does not have shortness of breath or chest pain.     Assessment/plan:         1.  Acute kidney injury - nonoliguric and most consistent with toxic acute tubular necrosis secondary to vancomycin.  Although GFR is less than 15 mL/min, patient does not have clinical symptoms of volume overload, is nonoliguric and does not have uremic symptoms.  She is reluctant to undergo dialysis and there are no acute indications for dialysis today. Plan: Continue IV fluid 0.9 normal saline at 75 mL/h. Avoid nephrotoxic agents. Strict input and output documentation.     Basic metabolic profile daily.         2.  Systemic hypertension - blood pressure is adequately controlled.         Prognosis is guarded.  We will monitor serum creatinine closely and if serum creatinine remains plateaued tomorrow, will continue to hold off dialysis. -------------------       IM     Her cellulitis is improving    Creatinine about the same , she is still producing good amount of urine    No acidosis or hyperkalemia     She has some nausea       Plan:            Bilateral lower extremity cellulitis: Currently on cefepime and daptomycin, continue to improve    Appreciate ID recommendation. Was on vancomycin initially but stopped secondary to CARLEY         Current lower extremity lymphedema: Continue Ace wrap, follow-up with lymphedema clinic as an outpatient         Acute kidney injury-nonoliguric: Secondary to vancomycin and diuresis, both were stopped creatinine continues to go up discussed with nephrologist ( Dr. Claudia Grimaldo) , will continue to monitor for today, might need dialysis if continues to worsen    Continue fluids at 75 mL/h    Avoid nephrotoxic agents         Morbid obesity as a complicating factor.         Continue DVT prophylaxis         Discussed with the patient and answered her questions up to her convenience         Discussed with nephrology, can wait today as well especially with good urine output normal potassium and acid-base    Plan to proceed with dialysis tomorrow if elevation persists    Or creatinine did come down    ---------------------    ID    Recommendations:    · Continue on cefepime and daptomycin for now but the doses have been adjusted for the renal insufficiency. · The cellulitis continues to improve. · The patient's renal parameters are starting to plateau and she continues to have good renal output.     · Given the good clinical improvement I do think that the antibiotic therapy can likely be stopped prior to discharge    ------------------------------             Scheduled Medications    · amLODIPine 5 mg Oral Daily    · cefepime 1 g Intravenous Q12H    · [START ON 8/21/2020] daptomycin (CUBICIN) IVPB 4 mg/kg (Adjusted) Intravenous Q48H    · heparin (porcine) 5,000 Units Subcutaneous 3 times per day    · [Held by provider] bumetanide 1 mg Oral BID    · sodium chloride flush 10 mL Intravenous 2 times per day    · oxybutynin 15 mg Oral TID       0.9 % sodium chloride infusion      Rate: 75 mL/hr Freq: CONTINUOUS Route: IV       PRN    oxyCODONE-acetaminophen (PERCOCET)  MG per tablet 1 tablet      Dose: 1 tablet    Freq: EVERY 4 HOURS PRN Route: PO    PRN Reason: Pain x3    --------------------          8/20/2020 05:48    Sodium: 141    Potassium: 3.7    Chloride: 105    CO2: 22    BUN: 27 (H)    Creatinine: 5.43 (HH)    Bun/Cre Ratio: 5 (L)    Anion Gap: 14    GFR Non-: 8 (L)    GFR : 10 (L)    Glucose: 86    Calcium: 8.7    GFR Comment: Pend    GFR Staging: NOT REPORTED           Cellulitis - Care Day 6 (8/18/2020) by Fatoumata Ya RN         Review Status  Review Entered    Completed  9/1/2020 16:16        Criteria Review       Care Day: 6 Care Date: 8/18/2020 Level of Care: Inpatient Floor    Guideline Day 1    Level Of Care    (X) Floor [C]    9/1/2020 4:16 PM EDT by Liz Loja      m/s    Clinical Status    ( ) * Clinical Indications met [D]    (X) Possible leukocytosis, fever, tachycardia, or hypotension    (X) Possible dehydration or altered mental status    Activity    (X) Activity as tolerated    Routes    (X) Oral or IV hydration, medications    (X) Diet as tolerated    Interventions    (X) CBC, electrolytes    9/1/2020 4:16 PM EDT by Liz Loja      8/18/2020 05:57  Sodium: 138  Potassium: 3.5 (L)  Chloride: 101  CO2: 24    (X) Possible lesion and blood cultures    (X) Possible imaging of cellulitis site    (X) Elevation of affected area, if possible    Medications    (X) IV antibiotics    9/1/2020 4:16 PM EDT by Liz Loja      daptomycin (CUBICIN) IVPB 4 mg/kg (Adjusted)[id number]    * Milestone    Additional Notes    8/18    /80   Eosinophil, Ur: NONE SEEN    Casts UA: NOT REPORTED    Mucus, UA: NOT REPORTED    WBC, UA: 5 TO 10    RBC, UA: 0 TO 2    Epithelial Cells, UA: 10 TO 20    Renal Epithelial, UA: NOT REPORTED    Bacteria, UA: FEW (A)    Amorphous, UA: NOT REPORTED    Yeast, Urine: FEW (A)    Crystals, UA: NOT REPORTED    Urine Hgb: 2+ (A)    Trichomonas, UA: NOT REPORTED    Other Observations UA: NOT REPORTED

## 2020-09-30 ENCOUNTER — TELEPHONE (OUTPATIENT)
Dept: FAMILY MEDICINE CLINIC | Age: 50
End: 2020-09-30

## 2020-10-05 ENCOUNTER — TELEPHONE (OUTPATIENT)
Dept: FAMILY MEDICINE CLINIC | Age: 50
End: 2020-10-05

## 2020-10-05 NOTE — TELEPHONE ENCOUNTER
Patient will need a referral for lymphadema clinic.  [facility] does not have anyone to do that.

## 2020-10-13 ENCOUNTER — TELEPHONE (OUTPATIENT)
Dept: FAMILY MEDICINE CLINIC | Age: 50
End: 2020-10-13

## 2020-10-16 ENCOUNTER — TELEPHONE (OUTPATIENT)
Dept: FAMILY MEDICINE CLINIC | Age: 50
End: 2020-10-16

## 2022-04-28 ENCOUNTER — TELEPHONE (OUTPATIENT)
Dept: FAMILY MEDICINE CLINIC | Age: 52
End: 2022-04-28

## 2022-04-28 NOTE — TELEPHONE ENCOUNTER
----- Message from Shawna Bledsoe sent at 4/28/2022 12:20 PM EDT -----  Subject: Message to Provider    QUESTIONS  Information for Provider? Tali is checking on   a pre-auth   ---------------------------------------------------------------------------  --------------  CALL BACK INFO  What is the best way for the office to contact you? Do not leave any   message, patient will call back for answer  Preferred Call Back Phone Number?  [phone]  ---------------------------------------------------------------------------  --------------  SCRIPT ANSWERS  undefined

## 2023-11-20 NOTE — TELEPHONE ENCOUNTER
Next Visit Date:  Future Appointments   Date Time Provider Ruddy Claudia   4/16/2019 10:40 AM Amilcar Heredia  [address]   Topic Date Due    HIV screen  12/09/1986    Cervical cancer screen  12/09/1992    Flu vaccine (Season Ended) 09/01/2019    Lipid screen  10/13/2022    DTaP/Tdap/Td vaccine (2 - Tdap) 10/01/2024    Pneumococcal 0-64 years Vaccine  Aged Out       No results found for: LABA1C          ( goal A1C is < 7)   No results found for: LABMICR  LDL Cholesterol (mg/dL)   Date Value   10/13/2017 90       (goal LDL is <100)   AST (U/L)   Date Value   04/08/2019 16     ALT (U/L)   Date Value   04/08/2019 9     BUN (mg/dL)   Date Value   04/08/2019 13     BP Readings from Last 3 Encounters:   04/08/19 (!) 142/81   04/02/19 122/78   01/12/19 (!) 142/90          (goal 120/80)    All Future Testing planned in [facility]  Lab Frequency Next Occurrence   Apnea Link Screening (THIS IS NOT Ka[facility] Radha [address] Only Once 01/16/2019               There is no problem list on file for this patient.
week(s)